# Patient Record
Sex: MALE | Race: WHITE | Employment: OTHER | URBAN - NONMETROPOLITAN AREA
[De-identification: names, ages, dates, MRNs, and addresses within clinical notes are randomized per-mention and may not be internally consistent; named-entity substitution may affect disease eponyms.]

---

## 2017-02-24 PROBLEM — R80.9 PROTEINURIA: Status: ACTIVE | Noted: 2017-02-24

## 2017-06-13 ENCOUNTER — HOSPITAL ENCOUNTER (OUTPATIENT)
Age: 67
Discharge: HOME | End: 2017-06-13
Payer: MEDICARE

## 2017-06-13 DIAGNOSIS — M10.9 ACUTE GOUT INVOLVING TOE OF LEFT FOOT, UNSPECIFIED CAUSE: ICD-10-CM

## 2017-06-13 PROBLEM — M25.376: Status: ACTIVE | Noted: 2017-06-13

## 2017-06-13 LAB
ANION GAP SERPL CALCULATED.3IONS-SCNC: 15 MMOL/L (ref 9–17)
BUN BLDV-MCNC: 23 MG/DL (ref 8–23)
BUN/CREAT BLD: 19 (ref 9–20)
CALCIUM SERPL-MCNC: 8.7 MG/DL (ref 8.6–10.4)
CHLORIDE BLD-SCNC: 104 MMOL/L (ref 98–107)
CO2: 22 MMOL/L (ref 20–31)
CREAT SERPL-MCNC: 1.19 MG/DL (ref 0.7–1.2)
GFR AFRICAN AMERICAN: >60 ML/MIN
GFR NON-AFRICAN AMERICAN: >60 ML/MIN
GFR SERPL CREATININE-BSD FRML MDRD: ABNORMAL ML/MIN/{1.73_M2}
GFR SERPL CREATININE-BSD FRML MDRD: ABNORMAL ML/MIN/{1.73_M2}
GLUCOSE BLD-MCNC: 158 MG/DL (ref 70–99)
POTASSIUM SERPL-SCNC: 3.8 MMOL/L (ref 3.7–5.3)
SODIUM BLD-SCNC: 141 MMOL/L (ref 135–144)
URIC ACID: 9.9 MG/DL (ref 3.4–7)

## 2017-06-13 PROCEDURE — 36415 COLL VENOUS BLD VENIPUNCTURE: CPT

## 2017-06-13 PROCEDURE — 84550 ASSAY OF BLOOD/URIC ACID: CPT

## 2017-06-13 PROCEDURE — 80048 BASIC METABOLIC PNL TOTAL CA: CPT

## 2017-11-06 ENCOUNTER — HOSPITAL ENCOUNTER (OUTPATIENT)
Dept: CT IMAGING | Age: 67
Discharge: HOME | End: 2017-11-06
Attending: INTERNAL MEDICINE | Admitting: INTERNAL MEDICINE
Payer: MEDICARE

## 2017-11-06 DIAGNOSIS — R91.1 LUNG NODULE: ICD-10-CM

## 2017-11-06 PROCEDURE — 71250 CT THORAX DX C-: CPT

## 2017-11-20 ENCOUNTER — OFFICE VISIT (OUTPATIENT)
Dept: PULMONOLOGY | Age: 67
End: 2017-11-20
Payer: MEDICARE

## 2017-11-20 VITALS
WEIGHT: 194 LBS | SYSTOLIC BLOOD PRESSURE: 135 MMHG | HEART RATE: 71 BPM | HEIGHT: 65 IN | RESPIRATION RATE: 16 BRPM | OXYGEN SATURATION: 99 % | TEMPERATURE: 97.2 F | BODY MASS INDEX: 32.32 KG/M2 | DIASTOLIC BLOOD PRESSURE: 82 MMHG

## 2017-11-20 DIAGNOSIS — R91.1 LUNG NODULE: Primary | ICD-10-CM

## 2017-11-20 DIAGNOSIS — E66.09 OBESITY DUE TO EXCESS CALORIES, UNSPECIFIED OBESITY SEVERITY: ICD-10-CM

## 2017-11-20 PROCEDURE — G8427 DOCREV CUR MEDS BY ELIG CLIN: HCPCS | Performed by: INTERNAL MEDICINE

## 2017-11-20 PROCEDURE — G8417 CALC BMI ABV UP PARAM F/U: HCPCS | Performed by: INTERNAL MEDICINE

## 2017-11-20 PROCEDURE — 1036F TOBACCO NON-USER: CPT | Performed by: INTERNAL MEDICINE

## 2017-11-20 PROCEDURE — 4040F PNEUMOC VAC/ADMIN/RCVD: CPT | Performed by: INTERNAL MEDICINE

## 2017-11-20 PROCEDURE — 1123F ACP DISCUSS/DSCN MKR DOCD: CPT | Performed by: INTERNAL MEDICINE

## 2017-11-20 PROCEDURE — 3017F COLORECTAL CA SCREEN DOC REV: CPT | Performed by: INTERNAL MEDICINE

## 2017-11-20 PROCEDURE — 99214 OFFICE O/P EST MOD 30 MIN: CPT | Performed by: INTERNAL MEDICINE

## 2017-11-20 PROCEDURE — G8482 FLU IMMUNIZE ORDER/ADMIN: HCPCS | Performed by: INTERNAL MEDICINE

## 2017-11-20 NOTE — PROGRESS NOTES
annually. Recommendations given regarding pneumococcal vaccinations. Maintain an active lifestyle. Questions  Patient had were answered to his satisfaction. CT scan of the chest was reviewed. As the lung nodule has been stable for 24 months. We will not repeat a CT scan of the chest.  Patient has smoked for less than 30 pack per day and hence would not qualify for lung cancer screen  We'll see the patient back as needed  Thank you for having us involved in the care of your patient. Please call us if you have any questions or concerns.       Renato Arreguin MD             11/20/2017, 3:24 PM

## 2017-12-15 DIAGNOSIS — I10 ESSENTIAL HYPERTENSION: ICD-10-CM

## 2017-12-15 DIAGNOSIS — E11.21 TYPE 2 DIABETES MELLITUS WITH DIABETIC NEPHROPATHY, WITHOUT LONG-TERM CURRENT USE OF INSULIN (HCC): ICD-10-CM

## 2017-12-15 RX ORDER — LISINOPRIL 2.5 MG/1
2.5 TABLET ORAL DAILY
Qty: 90 TABLET | Refills: 3 | Status: SHIPPED | OUTPATIENT
Start: 2017-12-15 | End: 2018-12-18 | Stop reason: SDUPTHER

## 2017-12-15 RX ORDER — METFORMIN HYDROCHLORIDE 500 MG/1
500 TABLET, EXTENDED RELEASE ORAL
Qty: 90 TABLET | Refills: 3 | Status: SHIPPED | OUTPATIENT
Start: 2017-12-15 | End: 2018-12-15 | Stop reason: SDUPTHER

## 2018-02-23 ENCOUNTER — OFFICE VISIT (OUTPATIENT)
Dept: FAMILY MEDICINE CLINIC | Age: 68
End: 2018-02-23
Payer: MEDICARE

## 2018-02-23 VITALS
BODY MASS INDEX: 33.26 KG/M2 | SYSTOLIC BLOOD PRESSURE: 120 MMHG | WEIGHT: 199.6 LBS | HEIGHT: 65 IN | DIASTOLIC BLOOD PRESSURE: 68 MMHG

## 2018-02-23 DIAGNOSIS — E78.5 HYPERLIPIDEMIA, UNSPECIFIED HYPERLIPIDEMIA TYPE: ICD-10-CM

## 2018-02-23 DIAGNOSIS — I10 ESSENTIAL HYPERTENSION: Primary | ICD-10-CM

## 2018-02-23 DIAGNOSIS — E11.9 TYPE 2 DIABETES MELLITUS WITHOUT COMPLICATION, UNSPECIFIED LONG TERM INSULIN USE STATUS: ICD-10-CM

## 2018-02-23 DIAGNOSIS — Z12.5 SCREENING PSA (PROSTATE SPECIFIC ANTIGEN): ICD-10-CM

## 2018-02-23 DIAGNOSIS — E66.09 OBESITY DUE TO EXCESS CALORIES, UNSPECIFIED CLASSIFICATION, UNSPECIFIED WHETHER SERIOUS COMORBIDITY PRESENT: ICD-10-CM

## 2018-02-23 PROCEDURE — G8482 FLU IMMUNIZE ORDER/ADMIN: HCPCS | Performed by: FAMILY MEDICINE

## 2018-02-23 PROCEDURE — G8427 DOCREV CUR MEDS BY ELIG CLIN: HCPCS | Performed by: FAMILY MEDICINE

## 2018-02-23 PROCEDURE — 99214 OFFICE O/P EST MOD 30 MIN: CPT | Performed by: FAMILY MEDICINE

## 2018-02-23 PROCEDURE — 1123F ACP DISCUSS/DSCN MKR DOCD: CPT | Performed by: FAMILY MEDICINE

## 2018-02-23 PROCEDURE — 4040F PNEUMOC VAC/ADMIN/RCVD: CPT | Performed by: FAMILY MEDICINE

## 2018-02-23 PROCEDURE — 1036F TOBACCO NON-USER: CPT | Performed by: FAMILY MEDICINE

## 2018-02-23 PROCEDURE — 3017F COLORECTAL CA SCREEN DOC REV: CPT | Performed by: FAMILY MEDICINE

## 2018-02-23 PROCEDURE — 3045F PR MOST RECENT HEMOGLOBIN A1C LEVEL 7.0-9.0%: CPT | Performed by: FAMILY MEDICINE

## 2018-02-23 PROCEDURE — G8417 CALC BMI ABV UP PARAM F/U: HCPCS | Performed by: FAMILY MEDICINE

## 2018-02-23 NOTE — PROGRESS NOTES
PO Take 1 tablet by mouth daily     Yes Historical Provider, MD   therapeutic multivitamin-minerals (THERAGRAN-M) tablet Take 1 tablet by mouth daily.       Yes Historical Provider, MD   vitamin D (CHOLECALCIFEROL) 1000 UNIT TABS tablet Take 1,000 Units by mouth daily.       Yes Historical Provider, MD   allopurinol (ZYLOPRIM) 100 MG tablet Take 1 tablet by mouth daily 6/13/17     Janie Tavares MD            ROS:  General Constitutional: Denies chills. Denies fever. Denies headache. Denies lightheadedness. Ophthalmologic: Denies blurred vision. ENT: Denies nasal congestion. Denies sore throat. Denies ear pain and pressure. Respiratory: Denies cough. Denies shortness of breath. Denies wheezing. Cardiovascular: Denies chest pain at rest. Denies irregular heartbeat. Denies palpitations. Gastrointestinal: Denies abdominal pain. Denies blood in the stool. Denies constipation. Denies diarrhea. Denies nausea. Denies vomiting. Genitourinary: Denies blood in the urine. Denies difficulty urinating. Denies frequent urination. Denies painful urination. Denies urinary incontinence  Musculoskeletal: Denies muscle aches. Denies painful joints. Denies swollen joints. Peripheral Vascular: Denies pain/cramping in legs after exertion. Skin: Denies dry skin. Denies itching. Denies rash. Neurologic: Denies falls. Denies dizziness. Denies fainting. Denies tingling/numbness. Psychiatric: Denies sleep disturbance. Denies anxiety.  Denies depressed mood.     Past Surgical History         Past Surgical History:   Procedure Laterality Date    APPENDECTOMY   1974    CYSTOSCOPY   2000     (-)    CYSTOSCOPY   03/2012     retrograde Dr. Alina Lemon: (-)    CYSTOURETHROSCOPY   4/3/00,6/13/89, 3/16/2012    TONSILLECTOMY AND ADENOIDECTOMY                Family History          Family History   Problem Relation Age of Onset    Cancer Father         leukemia    Depression Sister              Past Medical History        Past Medical History:

## 2018-02-23 NOTE — PROGRESS NOTES
The following note was scribed by: {Blank single:[id number]::\"LEILANI Aguilar [facility]\",\"TITO Stephen Serut, RMA\",\"TRAVON Claude BrightWing Narayan, RMA\"}    [address] [address]  [place] [address]  Dept: [phone]    Jarrett Mccormick is a 69 y.o. male here for 6 Month Follow-Up; Diabetes; Hyperlipidemia; and Hypertension      HPI:    DIABETES MELLITUS TYPE II   Medication compliance:  compliant most of the time  Diabetic diet compliance:  compliant most of the time  Current exercise: walks 1-2 miles daily  How long are you exercising during the week? 45 min of walking 3 days a week  Frequency of testing: n/a  Home blood sugar records: n/a  Any episodes of hypoglycemia? no  Eye exam current (within one year): Yes, Dr. Ashly Dobbins  Diabetic foot check in the past year Yes 08/25/2017   reports that he quit smoking about 5 years ago. His smoking use included Cigarettes, Pipe, and Cigars. He quit after 30.00 years of use. He has never used smokeless tobacco.      HYPERTENSION  He is exercising, for 45 min 3 days a week and is adherent to a low-salt diet.  Blood pressure is not being monitored at home. Cardiac symptoms: none. Patient denies: chest pain, irregular heart beat and palpitations.     HYPERLIPIDEMIA    Medication compliance: compliant all of the time. Patient is  following a low fat, low cholesterol diet.  He is  exercising regularly, for 45 min a day 3 days a week.     He is accompanied by his wife, Anand Dobbins. Prior to Admission medications    Medication Sig Start Date End Date Taking?  Authorizing Provider   metFORMIN (GLUCOPHAGE-XR) 500 MG extended release tablet Take 1 tablet by mouth daily (with breakfast) 12/15/17  Yes Miguelina Ramirez MD   lisinopril (PRINIVIL;ZESTRIL) 2.5 MG tablet Take 1 tablet by mouth daily 12/15/17  Yes Miguelina Ramirez MD   aspirin 81 MG tablet Take 81 mg by mouth daily   Yes Historical Provider, MD   CINNAMON PO Take 1 tablet by mouth 3 times daily    Yes 02/09/2013    A1C 7.2    Esophageal reflux 7/2001    Gastritis     GERD (gastroesophageal reflux disease)     Hematuria     Hyperglycemia 9/1999    Hyperlipidemia     Hypertension     Obesity, unspecified 2/2/2015    Other abnormal glucose 2/2/2015    Other and unspecified hyperlipidemia 7/1992    Renal cyst     Syncope and collapse 2/2/2015    Type II or unspecified type diabetes mellitus without mention of complication, not stated as uncontrolled 2012    Unspecified essential hypertension 10/1991    borderline    Vasodepressor syncope 9/2006      Social History   Substance Use Topics    Smoking status: Former Smoker     Years: 30.00     Types: Cigarettes, Pipe, Cigars     Quit date: 2/1/2012    Smokeless tobacco: Never Used    Alcohol use No      Current Outpatient Prescriptions   Medication Sig Dispense Refill    metFORMIN (GLUCOPHAGE-XR) 500 MG extended release tablet Take 1 tablet by mouth daily (with breakfast) 90 tablet 3    lisinopril (PRINIVIL;ZESTRIL) 2.5 MG tablet Take 1 tablet by mouth daily 90 tablet 3    aspirin 81 MG tablet Take 81 mg by mouth daily      CINNAMON PO Take 1 tablet by mouth 3 times daily       LUTEIN-ZEAXANTHIN PO Take 1 tablet by mouth daily      therapeutic multivitamin-minerals (THERAGRAN-M) tablet Take 1 tablet by mouth daily.  vitamin D (CHOLECALCIFEROL) 1000 UNIT TABS tablet Take 1,000 Units by mouth daily.  allopurinol (ZYLOPRIM) 100 MG tablet Take 1 tablet by mouth daily 90 tablet 0     No current facility-administered medications for this visit.       No Known Allergies    PHYSICAL EXAM:    /68   Ht 5' 5\" (1.651 m)   Wt 199 lb 9.6 oz (90.5 kg)   BMI 33.22 kg/m²   Wt Readings from Last 3 Encounters:   02/23/18 199 lb 9.6 oz (90.5 kg)   11/20/17 194 lb (88 kg)   08/25/17 198 lb 12.8 oz (90.2 kg)     BP Readings from Last 3 Encounters:   02/23/18 120/68   11/20/17 135/82   08/25/17 (!) 140/78       General Appearance: in no acute

## 2018-02-23 NOTE — PATIENT INSTRUCTIONS
PLAN:  I review his lab results. This shows that his microalbumin level was over 39. This level has improved but I explain that this means that he spilling some protein over into his urine and over time, this can cause kidney damage. He denies any side effects from the metformin. His hemoglobin A1C level has not improved. We discuss his health being a full time job. He retired in October of last year. He has gained 15-20 pounds in 3-4 years. Weight loss would be beneficial for his diabetes. We discuss dietary problems. He needs to restrict simple carbohydrates such as pastas, potatoes, white breads, white rices, etc. Ultimately, these are his disease states and he will have to change the outcome by avoiding certain things. I will give him a copy of The GI Diet book. He can use this as a tool when making dietary choices that would be beneficial for diabetes. I will see him back in 6 months. PLAN:  I review his lab results. This shows that his microalbumin level was over 39. This level has improved but I explain that this means that he spilling some protein over into his urine and over time, this can cause kidney damage. He denies any side effects from the metformin. His hemoglobin A1C level has not improved. We discuss his health being a full time job. He retired in October of last year. He has gained 15-20 pounds in 3-4 years. Weight loss would be beneficial for his diabetes. We discuss dietary problems. He needs to restrict simple carbohydrates such as pastas, potatoes, white breads, white rices, etc. Ultimately, these are his disease states and he will have to change the outcome by avoiding certain things. I will give him a copy of The GI Diet book. He can use this as a tool when making dietary choices that would be beneficial for diabetes. I will see him back in 6 months.

## 2018-04-11 PROBLEM — Z12.5 SCREENING PSA (PROSTATE SPECIFIC ANTIGEN): Status: RESOLVED | Noted: 2018-02-23 | Resolved: 2018-04-11

## 2018-08-17 LAB
ABSOLUTE BASO #: 0.1 K/UL (ref 0–0.1)
ABSOLUTE EOS #: 0.3 K/UL (ref 0.1–0.4)
ABSOLUTE LYMPH #: 2.1 K/UL (ref 0.8–5.2)
ABSOLUTE MONO #: 0.6 K/UL (ref 0.1–0.9)
ABSOLUTE NEUT #: 4.6 K/UL (ref 1.3–9.1)
ALT SERPL-CCNC: 40 U/L (ref 5–50)
ANION GAP SERPL CALCULATED.3IONS-SCNC: 15 MEQ/L (ref 10–19)
AST SERPL-CCNC: 24 U/L (ref 9–50)
AVERAGE GLUCOSE: 197 MG/DL (ref 66–114)
BASOPHILS RELATIVE PERCENT: 1 %
BUN BLDV-MCNC: 28 MG/DL (ref 8–23)
CALCIUM SERPL-MCNC: 9.5 MG/DL (ref 8.5–10.5)
CHLORIDE BLD-SCNC: 101 MEQ/L (ref 95–107)
CO2: 25 MEQ/L (ref 19–31)
CREAT SERPL-MCNC: 1.3 MG/DL (ref 0.8–1.4)
EGFR AFRICAN AMERICAN: 64.1 ML/MIN/1.73 M2
EGFR IF NONAFRICAN AMERICAN: 55.3 ML/MIN/1.73 M2
EOSINOPHILS RELATIVE PERCENT: 3.9 %
GLUCOSE: 211 MG/DL (ref 70–99)
HBA1C MFR BLD: 8.5 % (ref 4.2–5.8)
HCT VFR BLD CALC: 44.6 % (ref 41.4–51)
HEMOGLOBIN: 15.3 G/DL (ref 13.8–17)
HIGH SENSITIVE C-REACTIVE PROTEIN: 4.37 MG/L
LYMPHOCYTE %: 27.2 %
MCH RBC QN AUTO: 30.1 PG (ref 27–34)
MCHC RBC AUTO-ENTMCNC: 34.3 G/DL (ref 31–36)
MCV RBC AUTO: 87.6 FL (ref 80–100)
MONOCYTES # BLD: 7.4 %
NEUTROPHILS RELATIVE PERCENT: 60 %
PDW BLD-RTO: 13 % (ref 10.8–14.8)
PLATELETS: 199 K/UL (ref 150–450)
POTASSIUM SERPL-SCNC: 4.6 MEQ/L (ref 3.5–5.4)
PSA, ULTRASENSITIVE: 0.58 NG/ML
RBC: 5.09 M/UL (ref 4–5.5)
SODIUM BLD-SCNC: 141 MEQ/L (ref 135–146)
WBC: 7.7 K/UL (ref 3.7–10.8)

## 2018-08-24 ENCOUNTER — OFFICE VISIT (OUTPATIENT)
Dept: FAMILY MEDICINE CLINIC | Age: 68
End: 2018-08-24
Payer: MEDICARE

## 2018-08-24 VITALS
BODY MASS INDEX: 33.76 KG/M2 | DIASTOLIC BLOOD PRESSURE: 78 MMHG | SYSTOLIC BLOOD PRESSURE: 132 MMHG | HEIGHT: 65 IN | WEIGHT: 202.6 LBS

## 2018-08-24 DIAGNOSIS — Z12.5 SCREENING PSA (PROSTATE SPECIFIC ANTIGEN): ICD-10-CM

## 2018-08-24 DIAGNOSIS — E66.9 OBESITY, UNSPECIFIED CLASSIFICATION, UNSPECIFIED OBESITY TYPE, UNSPECIFIED WHETHER SERIOUS COMORBIDITY PRESENT: ICD-10-CM

## 2018-08-24 DIAGNOSIS — E78.5 HYPERLIPIDEMIA, UNSPECIFIED HYPERLIPIDEMIA TYPE: ICD-10-CM

## 2018-08-24 DIAGNOSIS — Z23 NEED FOR PROPHYLACTIC VACCINATION AGAINST STREPTOCOCCUS PNEUMONIAE (PNEUMOCOCCUS): ICD-10-CM

## 2018-08-24 DIAGNOSIS — I10 ESSENTIAL HYPERTENSION: ICD-10-CM

## 2018-08-24 PROCEDURE — 1101F PT FALLS ASSESS-DOCD LE1/YR: CPT | Performed by: FAMILY MEDICINE

## 2018-08-24 PROCEDURE — 90670 PCV13 VACCINE IM: CPT | Performed by: FAMILY MEDICINE

## 2018-08-24 PROCEDURE — 3045F PR MOST RECENT HEMOGLOBIN A1C LEVEL 7.0-9.0%: CPT | Performed by: FAMILY MEDICINE

## 2018-08-24 PROCEDURE — 3017F COLORECTAL CA SCREEN DOC REV: CPT | Performed by: FAMILY MEDICINE

## 2018-08-24 PROCEDURE — 1036F TOBACCO NON-USER: CPT | Performed by: FAMILY MEDICINE

## 2018-08-24 PROCEDURE — 99214 OFFICE O/P EST MOD 30 MIN: CPT | Performed by: FAMILY MEDICINE

## 2018-08-24 PROCEDURE — 4040F PNEUMOC VAC/ADMIN/RCVD: CPT | Performed by: FAMILY MEDICINE

## 2018-08-24 PROCEDURE — G0009 ADMIN PNEUMOCOCCAL VACCINE: HCPCS | Performed by: FAMILY MEDICINE

## 2018-08-24 PROCEDURE — 2022F DILAT RTA XM EVC RTNOPTHY: CPT | Performed by: FAMILY MEDICINE

## 2018-08-24 PROCEDURE — G8427 DOCREV CUR MEDS BY ELIG CLIN: HCPCS | Performed by: FAMILY MEDICINE

## 2018-08-24 PROCEDURE — G8417 CALC BMI ABV UP PARAM F/U: HCPCS | Performed by: FAMILY MEDICINE

## 2018-08-24 PROCEDURE — 1123F ACP DISCUSS/DSCN MKR DOCD: CPT | Performed by: FAMILY MEDICINE

## 2018-08-24 NOTE — PATIENT INSTRUCTIONS
PLAN:  Labs reviewed with patient, I am overall pleased with these results. His A1C went up quite a bit, he admits to not exercising often in the summer. This is an uncontrolled diabetic range, I will start him on Trulicity 9.42 injection, once weekly injection. I discuss with him, his risks for heart attacks and strokes and how to help lower his risks. He is agreeable with this. Shingrix, diabetic eye exam, fit kit/colonoscopy discussed with patient. SURVEY:    You may be receiving a survey from [employer] regarding your visit today. Please complete the survey to enable us to provide the highest quality of care to you and your family. If you cannot score us a very good on any question, please call the office to discuss how we could of made your experience a very good one. Thank you.

## 2018-08-24 NOTE — PROGRESS NOTES
PARISA Byrd, am scribing for and in the presence of Dr. Edgar Camacho. 8/24/18/9:57am/SNP    [address] [address]  [place] [address]  Dept: [phone]    Gisell Adams is a 77 y.o. male here for 6 Month Follow-Up; Diabetes; Hypertension; and Hyperlipidemia    HPI:  DIABETES MELLITUS TYPE II   Medication compliance: compliant most of the time  Diabetic diet compliance: compliant most of the time  Current exercise: walking  How long are you exercising during the week? 45 min, 3 days a week  Frequency of testing: none  Any episodes of hypoglycemia? no  Eye exam current (within one year): Yes, Dr. Gann Quiet  Diabetic foot check in the past year Yes, today   reports that he quit smoking about 5 years ago. His smoking use included Cigarettes, Pipe, and Cigars. He quit after 30.00 years of use. He has never used smokeless tobacco.      HYPERTENSION  He is exercising, for 45 min 3 days a week and is adherent to a low-salt diet.  Blood pressure is not being monitored at home. Cardiac symptoms: none. Patient denies: chest pain, irregular heart beat and palpitations.     HYPERLIPIDEMIA    Medication compliance: compliant all of the time. Patient is  following a low fat, low cholesterol diet.  He is  exercising regularly, for 45 min a day 3 days a week. Prior to Admission medications    Medication Sig Start Date End Date Taking?  Authorizing Provider   Dulaglutide (TRULICITY) 5.94 GS/6.1TS SOPN Inject 0.75 mg into the skin once a week 8/24/18  Yes Edgar Camacho MD   metFORMIN (GLUCOPHAGE-XR) 500 MG extended release tablet Take 1 tablet by mouth daily (with breakfast) 12/15/17  Yes Edgar Camacho MD   lisinopril (PRINIVIL;ZESTRIL) 2.5 MG tablet Take 1 tablet by mouth daily 12/15/17  Yes Edgar Camacho MD   allopurinol (ZYLOPRIM) 100 MG tablet Take 1 tablet by mouth daily 6/13/17  Yes Edgar Camacho MD   aspirin 81 MG tablet Take 81 mg by mouth daily   Yes Historical Provider, MD   CINNAMON PO Take 1 tablet by mouth 3 times daily    Yes Historical Provider, MD   LUTEIN-ZEAXANTHIN PO Take 1 tablet by mouth daily   Yes Historical Provider, MD   therapeutic multivitamin-minerals (THERAGRAN-M) tablet Take 1 tablet by mouth daily. Yes Historical Provider, MD   vitamin D (CHOLECALCIFEROL) 1000 UNIT TABS tablet Take 1,000 Units by mouth daily. Yes Historical Provider, MD     ROS:  General Constitutional: Denies chills. Denies fever. Denies headache. Denies lightheadedness. Ophthalmologic: Denies blurred vision. ENT: Denies nasal congestion. Denies sore throat. Denies ear pain and pressure. Respiratory: Denies cough. Denies shortness of breath. Denies wheezing. Cardiovascular: Denies chest pain at rest. Denies irregular heartbeat. Denies palpitations. Gastrointestinal: Denies abdominal pain. Denies blood in the stool. Denies constipation. Denies diarrhea. Denies nausea. Denies vomiting. Genitourinary: Denies blood in the urine. Denies difficulty urinating. Denies frequent urination. Denies painful urination. Denies urinary incontinence  Musculoskeletal: Denies muscle aches. Denies painful joints. Denies swollen joints. Peripheral Vascular: Denies pain/cramping in legs after exertion. Skin: Denies dry skin. Denies itching. Denies rash. Neurologic: Denies falls. Denies dizziness. Denies fainting. Denies tingling/numbness. Psychiatric: Denies sleep disturbance. Denies anxiety. Denies depressed mood.     Past Surgical History:   Procedure Laterality Date    APPENDECTOMY  1974    CYSTOSCOPY  2000    (-)    CYSTOSCOPY  03/2012    retrograde Dr. Baron Leary: (-)    CYSTOURETHROSCOPY  4/3/00,6/13/89, 3/16/2012    TONSILLECTOMY AND ADENOIDECTOMY       Family History   Problem Relation Age of Onset    Cancer Father         leukemia    Depression Sister      Past Medical History:   Diagnosis Date    Benign hematuria     Diabetes mellitus ([facility] [address] 02/09/2013    A1C 7.2  Esophageal reflux 7/2001    Gastritis     GERD (gastroesophageal reflux disease)     Hematuria     Hyperglycemia 9/1999    Hyperlipidemia     Hypertension     Obesity, unspecified 2/2/2015    Other abnormal glucose 2/2/2015    Other and unspecified hyperlipidemia 7/1992    Renal cyst     Syncope and collapse 2/2/2015    Type II or unspecified type diabetes mellitus without mention of complication, not stated as uncontrolled 2012    Unspecified essential hypertension 10/1991    borderline    Vasodepressor syncope 9/2006      Social History   Substance Use Topics    Smoking status: Former Smoker     Packs/day: 0.75     Years: 30.00     Types: Cigars, Cigarettes, Pipe     Quit date: 2/1/2012    Smokeless tobacco: Never Used    Alcohol use No      Current Outpatient Prescriptions   Medication Sig Dispense Refill    Dulaglutide (TRULICITY) 1.65 VD/6.4FM SOPN Inject 0.75 mg into the skin once a week 3 pen 0    metFORMIN (GLUCOPHAGE-XR) 500 MG extended release tablet Take 1 tablet by mouth daily (with breakfast) 90 tablet 3    lisinopril (PRINIVIL;ZESTRIL) 2.5 MG tablet Take 1 tablet by mouth daily 90 tablet 3    allopurinol (ZYLOPRIM) 100 MG tablet Take 1 tablet by mouth daily 90 tablet 0    aspirin 81 MG tablet Take 81 mg by mouth daily      CINNAMON PO Take 1 tablet by mouth 3 times daily       LUTEIN-ZEAXANTHIN PO Take 1 tablet by mouth daily      therapeutic multivitamin-minerals (THERAGRAN-M) tablet Take 1 tablet by mouth daily.  vitamin D (CHOLECALCIFEROL) 1000 UNIT TABS tablet Take 1,000 Units by mouth daily. No current facility-administered medications for this visit.       No Known Allergies    PHYSICAL EXAM:    /78 (Site: Left Arm, Position: Sitting, Cuff Size: Large Adult)   Ht 5' 5\" (1.651 m)   Wt 202 lb 9.6 oz (91.9 kg)   BMI 33.71 kg/m²   Wt Readings from Last 3 Encounters:   08/24/18 202 lb 9.6 oz (91.9 kg)   02/23/18 199 lb 9.6 oz (90.5 kg)   11/20/17 194 I am overall pleased with these results. His A1C went up quite a bit, he admits to not exercising often in the summer. This is an uncontrolled diabetic range, I will start him on Trulicity 0.41 injection, once weekly injection. I discuss with him, his risks for heart attacks and strokes and how to help lower his risks. He is agreeable with this. Shingrix, diabetic eye exam, fit kit/colonoscopy discussed with patient. Orders Placed This Encounter   Procedures    PREVNAR 13 IM (Pneumococcal conjugate vaccine 13-valent)    ALT     Standing Status:   Future     Standing Expiration Date:   8/24/2019    AST     Standing Status:   Future     Standing Expiration Date:   8/24/2019    Basic Metabolic Panel     Standing Status:   Future     Standing Expiration Date:   8/24/2019    CBC     Standing Status:   Future     Standing Expiration Date:   8/24/2019   [facility] CRP,High Sensitivity     Standing Status:   Future     Standing Expiration Date:   8/24/2019    Hemoglobin A1C     Standing Status:   Future     Standing Expiration Date:   8/24/2019    Lipid Panel     Standing Status:   Future     Standing Expiration Date:   8/24/2019     Order Specific Question:   Is Patient Fasting?/# of Hours     Answer:   no fasting required    Psa screening     Standing Status:   Future     Standing Expiration Date:   8/24/2019    Microalbumin, Ur     Standing Status:   Future     Standing Expiration Date:   8/24/2019    HM DIABETES FOOT EXAM    WA INJECTION,THERAP/PROPH/DIAGNOST, IM OR SUBCUT     Orders Placed This Encounter   Medications    Dulaglutide (TRULICITY) 1.82 ER/4.0JE SOPN     Sig: Inject 0.75 mg into the skin once a week     Dispense:  3 pen     Refill:  0   I, Dr. Josiah Kaye, personally performed the services described in this documentation as scribed by TITO Cannon in my presence, and it is both accurate and complete.       1. Uncontrolled type II diabetes mellitus with nephropathy ([address]  Worsening based

## 2018-09-17 ENCOUNTER — OFFICE VISIT (OUTPATIENT)
Dept: FAMILY MEDICINE CLINIC | Age: 68
End: 2018-09-17
Payer: MEDICARE

## 2018-09-17 VITALS
SYSTOLIC BLOOD PRESSURE: 134 MMHG | HEIGHT: 65 IN | WEIGHT: 201 LBS | DIASTOLIC BLOOD PRESSURE: 76 MMHG | BODY MASS INDEX: 33.49 KG/M2

## 2018-09-17 DIAGNOSIS — I10 ESSENTIAL HYPERTENSION: ICD-10-CM

## 2018-09-17 DIAGNOSIS — I95.1 ORTHOSTASIS: Primary | ICD-10-CM

## 2018-09-17 PROCEDURE — G8427 DOCREV CUR MEDS BY ELIG CLIN: HCPCS | Performed by: FAMILY MEDICINE

## 2018-09-17 PROCEDURE — 2022F DILAT RTA XM EVC RTNOPTHY: CPT | Performed by: FAMILY MEDICINE

## 2018-09-17 PROCEDURE — 1036F TOBACCO NON-USER: CPT | Performed by: FAMILY MEDICINE

## 2018-09-17 PROCEDURE — G8417 CALC BMI ABV UP PARAM F/U: HCPCS | Performed by: FAMILY MEDICINE

## 2018-09-17 PROCEDURE — 82962 GLUCOSE BLOOD TEST: CPT | Performed by: FAMILY MEDICINE

## 2018-09-17 PROCEDURE — 3017F COLORECTAL CA SCREEN DOC REV: CPT | Performed by: FAMILY MEDICINE

## 2018-09-17 PROCEDURE — 4040F PNEUMOC VAC/ADMIN/RCVD: CPT | Performed by: FAMILY MEDICINE

## 2018-09-17 PROCEDURE — 99213 OFFICE O/P EST LOW 20 MIN: CPT | Performed by: FAMILY MEDICINE

## 2018-09-17 PROCEDURE — 1101F PT FALLS ASSESS-DOCD LE1/YR: CPT | Performed by: FAMILY MEDICINE

## 2018-09-17 PROCEDURE — 1123F ACP DISCUSS/DSCN MKR DOCD: CPT | Performed by: FAMILY MEDICINE

## 2018-09-17 PROCEDURE — 3045F PR MOST RECENT HEMOGLOBIN A1C LEVEL 7.0-9.0%: CPT | Performed by: FAMILY MEDICINE

## 2018-09-17 NOTE — PROGRESS NOTES
02/23/18 120/68      PHYSICAL EXAM:  General Appearance: in no acute distress, well developed, well nourished. Eyes: pupils equal, round reactive to light and accommodation. Wearing glasses constricted 1 mm, equal, no nystagmus in sitting position. Ears: Moderate amount of cerumen upper portion of TM's appear normal  Nose: nares patent, no lesions. Oral Cavity: mucosa moist.  Throat: clear. Neck/Thyroid: neck supple, full range of motion, no cervical lymphadenopathy, no thyromegaly or carotid bruits. Skin: warm and dry. No suspicious lesions. Heart: regular rate and rhythm. No murmurs. S1, S2 normal, no gallops. Rate: 60   Lungs: clear to auscultation bilaterally. Abdomen: bowel sounds present, soft, nontender, nondistended, no masses or organomegaly obese, round. Musculoskeletal: normal, full range of motion in knees and hips, no swelling or tenderness. Extremities: no cyanosis or edema. Peripheral Pulses: 2+ throughout, symetric. Neurologic: nonfocal, motor strength normal upper and lower extremities, sensory exam intact. Psych: normal affect, speech fluent. ASSESSMENT:   Diagnosis Orders   1. Orthostasis  Glucose, Random   2. Uncontrolled diabetes mellitus type 2 without complications, unspecified whether long term insulin use (HCC)  Glucose, Random   3. Essential hypertension            PLAN:  I will get a random glucose level, today. I don't think that his glucose is dropping too low because trulicity is just an insulin sensitizing agent. I will also get a sitting and standing BP because he has a history of orthostatic hypotension. I suspect that this is what is going on. No orders of the defined types were placed in this encounter.       Encounter Medications        I, Dr. Mitra Justin, personally performed the services described in this documentation as scribed by PARISA Giron in my presence, and it is both accurate and complete.

## 2018-09-23 PROBLEM — Z12.5 SCREENING PSA (PROSTATE SPECIFIC ANTIGEN): Status: RESOLVED | Noted: 2018-02-23 | Resolved: 2018-09-23

## 2018-10-19 RX ORDER — DULAGLUTIDE 0.75 MG/.5ML
INJECTION, SOLUTION SUBCUTANEOUS
Qty: 8 PEN | Refills: 3 | Status: SHIPPED | OUTPATIENT
Start: 2018-10-19 | End: 2019-02-27 | Stop reason: DRUGHIGH

## 2018-12-15 DIAGNOSIS — E11.21 TYPE 2 DIABETES MELLITUS WITH DIABETIC NEPHROPATHY, WITHOUT LONG-TERM CURRENT USE OF INSULIN (HCC): ICD-10-CM

## 2018-12-17 RX ORDER — METFORMIN HYDROCHLORIDE 500 MG/1
TABLET, EXTENDED RELEASE ORAL
Qty: 90 TABLET | Refills: 3 | Status: SHIPPED | OUTPATIENT
Start: 2018-12-17 | End: 2019-12-13 | Stop reason: SDUPTHER

## 2018-12-18 DIAGNOSIS — I10 ESSENTIAL HYPERTENSION: ICD-10-CM

## 2018-12-18 RX ORDER — LISINOPRIL 2.5 MG/1
TABLET ORAL
Qty: 90 TABLET | Refills: 3 | Status: SHIPPED | OUTPATIENT
Start: 2018-12-18 | End: 2019-12-13 | Stop reason: SDUPTHER

## 2018-12-18 NOTE — TELEPHONE ENCOUNTER
diabetes mellitus type 2 without complications ([facility] [address]     Type 2 diabetes mellitus without complication ([facility]     Need for prophylactic vaccination against Streptococcus pneumoniae (pneumococcus)     Orthostasis

## 2019-01-26 ENCOUNTER — HOSPITAL ENCOUNTER (EMERGENCY)
Age: 69
Discharge: HOME | End: 2019-01-26
Payer: MEDICARE

## 2019-01-26 ENCOUNTER — APPOINTMENT (OUTPATIENT)
Dept: GENERAL RADIOLOGY | Age: 69
End: 2019-01-26
Payer: MEDICARE

## 2019-01-26 VITALS
TEMPERATURE: 98.6 F | OXYGEN SATURATION: 97 % | HEART RATE: 76 BPM | DIASTOLIC BLOOD PRESSURE: 89 MMHG | SYSTOLIC BLOOD PRESSURE: 131 MMHG | RESPIRATION RATE: 20 BRPM

## 2019-01-26 DIAGNOSIS — S62.102A WRIST FRACTURE, CLOSED, LEFT, INITIAL ENCOUNTER: Primary | ICD-10-CM

## 2019-01-26 PROCEDURE — 73090 X-RAY EXAM OF FOREARM: CPT

## 2019-01-26 PROCEDURE — 90471 IMMUNIZATION ADMIN: CPT | Performed by: PHYSICIAN ASSISTANT

## 2019-01-26 PROCEDURE — 6360000002 HC RX W HCPCS: Performed by: PHYSICIAN ASSISTANT

## 2019-01-26 PROCEDURE — 29125 APPL SHORT ARM SPLINT STATIC: CPT

## 2019-01-26 PROCEDURE — 90715 TDAP VACCINE 7 YRS/> IM: CPT | Performed by: PHYSICIAN ASSISTANT

## 2019-01-26 PROCEDURE — 99283 EMERGENCY DEPT VISIT LOW MDM: CPT

## 2019-01-26 RX ORDER — HYDROCODONE BITARTRATE AND ACETAMINOPHEN 5; 325 MG/1; MG/1
1 TABLET ORAL EVERY 6 HOURS PRN
Qty: 16 TABLET | Refills: 0 | Status: SHIPPED | OUTPATIENT
Start: 2019-01-26 | End: 2019-01-30

## 2019-01-26 RX ADMIN — TETANUS TOXOID, REDUCED DIPHTHERIA TOXOID AND ACELLULAR PERTUSSIS VACCINE, ADSORBED 0.5 ML: 5; 2.5; 8; 8; 2.5 SUSPENSION INTRAMUSCULAR at 19:03

## 2019-01-26 ASSESSMENT — PAIN DESCRIPTION - PAIN TYPE: TYPE: ACUTE PAIN

## 2019-01-26 ASSESSMENT — PAIN DESCRIPTION - LOCATION: LOCATION: ARM

## 2019-01-26 ASSESSMENT — PAIN DESCRIPTION - ORIENTATION: ORIENTATION: LEFT

## 2019-01-26 ASSESSMENT — PAIN SCALES - GENERAL: PAINLEVEL_OUTOF10: 5

## 2019-01-30 ENCOUNTER — HOSPITAL ENCOUNTER (OUTPATIENT)
Age: 69
Discharge: HOME | End: 2019-01-30
Payer: MEDICARE

## 2019-01-30 LAB
ANION GAP SERPL CALCULATED.3IONS-SCNC: 14 MMOL/L (ref 9–17)
BUN BLDV-MCNC: 25 MG/DL (ref 8–23)
BUN/CREAT BLD: 18 (ref 9–20)
CALCIUM SERPL-MCNC: 9.6 MG/DL (ref 8.6–10.4)
CHLORIDE BLD-SCNC: 100 MMOL/L (ref 98–107)
CO2: 26 MMOL/L (ref 20–31)
CREAT SERPL-MCNC: 1.36 MG/DL (ref 0.7–1.2)
EKG ATRIAL RATE: 85 BPM
EKG P AXIS: 65 DEGREES
EKG P-R INTERVAL: 154 MS
EKG Q-T INTERVAL: 370 MS
EKG QRS DURATION: 92 MS
EKG QTC CALCULATION (BAZETT): 440 MS
EKG R AXIS: 6 DEGREES
EKG T AXIS: 28 DEGREES
EKG VENTRICULAR RATE: 85 BPM
GFR AFRICAN AMERICAN: >60 ML/MIN
GFR NON-AFRICAN AMERICAN: 52 ML/MIN
GFR SERPL CREATININE-BSD FRML MDRD: ABNORMAL ML/MIN/{1.73_M2}
GFR SERPL CREATININE-BSD FRML MDRD: ABNORMAL ML/MIN/{1.73_M2}
GLUCOSE BLD-MCNC: 153 MG/DL (ref 70–99)
HCT VFR BLD CALC: 47.9 % (ref 40.7–50.3)
HEMOGLOBIN: 15.3 G/DL (ref 13–17)
MCH RBC QN AUTO: 29.6 PG (ref 25.2–33.5)
MCHC RBC AUTO-ENTMCNC: 31.9 G/DL (ref 28.4–34.8)
MCV RBC AUTO: 92.6 FL (ref 82.6–102.9)
NRBC AUTOMATED: 0 PER 100 WBC
PDW BLD-RTO: 13 % (ref 11.8–14.4)
PLATELET # BLD: 225 K/UL (ref 138–453)
PMV BLD AUTO: 10.4 FL (ref 8.1–13.5)
POTASSIUM SERPL-SCNC: 4.5 MMOL/L (ref 3.7–5.3)
RBC # BLD: 5.17 M/UL (ref 4.21–5.77)
SODIUM BLD-SCNC: 140 MMOL/L (ref 135–144)
WBC # BLD: 8.9 K/UL (ref 3.5–11.3)

## 2019-01-30 PROCEDURE — 36415 COLL VENOUS BLD VENIPUNCTURE: CPT

## 2019-01-30 PROCEDURE — 93005 ELECTROCARDIOGRAM TRACING: CPT

## 2019-01-30 PROCEDURE — 85027 COMPLETE CBC AUTOMATED: CPT

## 2019-01-30 PROCEDURE — 80048 BASIC METABOLIC PNL TOTAL CA: CPT

## 2019-02-19 LAB
ABSOLUTE BASO #: 0.1 K/UL (ref 0–0.1)
ABSOLUTE EOS #: 0.3 K/UL (ref 0.1–0.4)
ABSOLUTE LYMPH #: 2.1 K/UL (ref 0.8–5.2)
ABSOLUTE MONO #: 0.5 K/UL (ref 0.1–0.9)
ABSOLUTE NEUT #: 4.6 K/UL (ref 1.3–9.1)
AVERAGE GLUCOSE: 160 MG/DL (ref 66–114)
BASOPHILS RELATIVE PERCENT: 1.1 %
EOSINOPHILS RELATIVE PERCENT: 3.8 %
HBA1C MFR BLD: 7.2 %
HCT VFR BLD CALC: 41.5 % (ref 41.4–51)
HEMOGLOBIN: 14.4 G/DL (ref 13.8–17)
LYMPHOCYTE %: 28 %
MCH RBC QN AUTO: 29.9 PG (ref 27–34)
MCHC RBC AUTO-ENTMCNC: 34.7 G/DL (ref 31–36)
MCV RBC AUTO: 86.1 FL (ref 80–100)
MONOCYTES # BLD: 6.7 %
NEUTROPHILS RELATIVE PERCENT: 60.3 %
PDW BLD-RTO: 12.8 % (ref 10.8–14.8)
PLATELETS: 214 K/UL (ref 150–450)
RBC: 4.82 M/UL (ref 4–5.5)
WBC: 7.6 K/UL (ref 3.7–10.8)

## 2019-02-20 LAB
ALT SERPL-CCNC: 34 U/L (ref 5–50)
ANION GAP SERPL CALCULATED.3IONS-SCNC: 14 MEQ/L (ref 10–19)
AST SERPL-CCNC: 22 U/L (ref 9–50)
BUN BLDV-MCNC: 17 MG/DL (ref 8–23)
CALCIUM SERPL-MCNC: 9.1 MG/DL (ref 8.5–10.5)
CHLORIDE BLD-SCNC: 106 MEQ/L (ref 95–107)
CHOLESTEROL/HDL RATIO: 5.1
CHOLESTEROL: 204 MG/DL
CO2: 22 MEQ/L (ref 19–31)
CREAT SERPL-MCNC: 1.3 MG/DL (ref 0.8–1.4)
CREATINE, URINE: 156.7 MG/DL (ref 39–259)
EGFR AFRICAN AMERICAN: 64.1 ML/MIN/1.73 M2
EGFR IF NONAFRICAN AMERICAN: 55.3 ML/MIN/1.73 M2
GLUCOSE: 145 MG/DL (ref 70–99)
HDLC SERPL-MCNC: 40 MG/DL
HIGH SENSITIVE C-REACTIVE PROTEIN: 5.83 MG/L
LDL CHOLESTEROL CALCULATED: 96 MG/DL
LDL/HDL RATIO: 2.4
MICROALBUMIN/CREAT 24H UR: 44.9 MG/DL (ref 1.2–40)
MICROALBUMIN/CREAT UR-RTO: 287 MG/G
POTASSIUM SERPL-SCNC: 4.2 MEQ/L (ref 3.5–5.4)
SODIUM BLD-SCNC: 142 MEQ/L (ref 135–146)
TRIGL SERPL-MCNC: 342 MG/DL
VLDLC SERPL CALC-MCNC: 68 MG/DL

## 2019-02-27 ENCOUNTER — OFFICE VISIT (OUTPATIENT)
Dept: FAMILY MEDICINE CLINIC | Age: 69
End: 2019-02-27
Payer: MEDICARE

## 2019-02-27 VITALS
SYSTOLIC BLOOD PRESSURE: 128 MMHG | BODY MASS INDEX: 33.52 KG/M2 | HEIGHT: 65 IN | WEIGHT: 201.2 LBS | DIASTOLIC BLOOD PRESSURE: 82 MMHG

## 2019-02-27 DIAGNOSIS — E78.5 HYPERLIPIDEMIA, UNSPECIFIED HYPERLIPIDEMIA TYPE: ICD-10-CM

## 2019-02-27 DIAGNOSIS — R55 VASODEPRESSOR SYNCOPE: ICD-10-CM

## 2019-02-27 DIAGNOSIS — I10 ESSENTIAL HYPERTENSION: ICD-10-CM

## 2019-02-27 DIAGNOSIS — E66.09 OBESITY DUE TO EXCESS CALORIES, UNSPECIFIED CLASSIFICATION, UNSPECIFIED WHETHER SERIOUS COMORBIDITY PRESENT: ICD-10-CM

## 2019-02-27 PROCEDURE — G8484 FLU IMMUNIZE NO ADMIN: HCPCS | Performed by: FAMILY MEDICINE

## 2019-02-27 PROCEDURE — 2022F DILAT RTA XM EVC RTNOPTHY: CPT | Performed by: FAMILY MEDICINE

## 2019-02-27 PROCEDURE — 99214 OFFICE O/P EST MOD 30 MIN: CPT | Performed by: FAMILY MEDICINE

## 2019-02-27 PROCEDURE — 1101F PT FALLS ASSESS-DOCD LE1/YR: CPT | Performed by: FAMILY MEDICINE

## 2019-02-27 PROCEDURE — 3017F COLORECTAL CA SCREEN DOC REV: CPT | Performed by: FAMILY MEDICINE

## 2019-02-27 PROCEDURE — 1036F TOBACCO NON-USER: CPT | Performed by: FAMILY MEDICINE

## 2019-02-27 PROCEDURE — G8417 CALC BMI ABV UP PARAM F/U: HCPCS | Performed by: FAMILY MEDICINE

## 2019-02-27 PROCEDURE — 4040F PNEUMOC VAC/ADMIN/RCVD: CPT | Performed by: FAMILY MEDICINE

## 2019-02-27 PROCEDURE — 1123F ACP DISCUSS/DSCN MKR DOCD: CPT | Performed by: FAMILY MEDICINE

## 2019-02-27 PROCEDURE — 3045F PR MOST RECENT HEMOGLOBIN A1C LEVEL 7.0-9.0%: CPT | Performed by: FAMILY MEDICINE

## 2019-02-27 PROCEDURE — G8427 DOCREV CUR MEDS BY ELIG CLIN: HCPCS | Performed by: FAMILY MEDICINE

## 2019-02-27 ASSESSMENT — PATIENT HEALTH QUESTIONNAIRE - PHQ9
SUM OF ALL RESPONSES TO PHQ9 QUESTIONS 1 & 2: 0
SUM OF ALL RESPONSES TO PHQ QUESTIONS 1-9: 0
1. LITTLE INTEREST OR PLEASURE IN DOING THINGS: 0
2. FEELING DOWN, DEPRESSED OR HOPELESS: 0
SUM OF ALL RESPONSES TO PHQ QUESTIONS 1-9: 0

## 2019-03-18 ENCOUNTER — HOSPITAL ENCOUNTER (OUTPATIENT)
Dept: OCCUPATIONAL THERAPY | Age: 69
Setting detail: THERAPIES SERIES
Discharge: HOME | End: 2019-03-18
Payer: MEDICARE

## 2019-03-18 ENCOUNTER — OFFICE VISIT (OUTPATIENT)
Dept: FAMILY MEDICINE CLINIC | Age: 69
End: 2019-03-18
Payer: MEDICARE

## 2019-03-18 DIAGNOSIS — Z12.12 SCREENING FOR COLORECTAL CANCER: Primary | ICD-10-CM

## 2019-03-18 DIAGNOSIS — Z12.11 SCREENING FOR COLORECTAL CANCER: Primary | ICD-10-CM

## 2019-03-18 LAB
CONTROL: PRESENT
HEMOCCULT STL QL: POSITIVE

## 2019-03-18 PROCEDURE — 97166 OT EVAL MOD COMPLEX 45 MIN: CPT

## 2019-03-18 PROCEDURE — 82274 ASSAY TEST FOR BLOOD FECAL: CPT | Performed by: FAMILY MEDICINE

## 2019-03-18 PROCEDURE — 97110 THERAPEUTIC EXERCISES: CPT

## 2019-03-18 PROCEDURE — 97140 MANUAL THERAPY 1/> REGIONS: CPT

## 2019-03-19 NOTE — PROGRESS NOTES
Patient presents to the office with his OC Light Fit kit. Specimen was tested and was noted to be positive. Wife was notified of referral and positive results.

## 2019-03-20 ENCOUNTER — TELEPHONE (OUTPATIENT)
Dept: GASTROENTEROLOGY | Age: 69
End: 2019-03-20

## 2019-03-20 ENCOUNTER — HOSPITAL ENCOUNTER (OUTPATIENT)
Dept: OCCUPATIONAL THERAPY | Age: 69
Setting detail: THERAPIES SERIES
Discharge: HOME | End: 2019-03-20
Payer: MEDICARE

## 2019-03-20 DIAGNOSIS — R19.5 POSITIVE FIT (FECAL IMMUNOCHEMICAL TEST): Primary | ICD-10-CM

## 2019-03-20 PROCEDURE — 97140 MANUAL THERAPY 1/> REGIONS: CPT

## 2019-03-20 PROCEDURE — 97018 PARAFFIN BATH THERAPY: CPT

## 2019-03-20 NOTE — TELEPHONE ENCOUNTER
Direct Endoscopy Referral       Fax to [phone] or call the [address] at [address] [facility]  1950  [phone] There is no work phone number on file.     Brandi [address]    Referring Provider: Lazarus Ruvalcaba MD   [address] [facility] [address] [phone]  Special Requests: PLEASE CALL AND SCHEDULE PATIENT    Check Requested Procedures:    [x]  Colonoscopy (Please check test type and diagnosis below)    []  CPT Code:  Screening Average Risk     []  CPT Code:  Screening High Risk                 [x]  CPT Code :  Diagnostic Colonoscopy 49018    []  Personal History of colon cancer (Date of surgery)               []  Personal History of colon polyps (Date of surgery)     []  Family history of colon cancer ([facility])     []  Family history of colon polyps (1st degree relative - [facility])     []  Abnormal barium enema or CT (Please attach report)    []  Change in bowel habits     []  Occult GI bleeding     []  Melena with negative EGD     []  Iron deficiency anemia    [x]  Other:  Positive fit kit    []  EGD (Please check test type and diagnosis below)     []  CPT Code: EGD 43881    []  Melena     []  Dyspepsia/GERD     []  Dysphagia     []  Epigastric pain unresponsive to treatment     []  Iron deficiency anemia with negative colonoscopy     []  Occult GI bleeding with negative colonoscopy     []  Abnormal UGI, x-ray, or CT (attach report)    [] Other:    HISTORY:  Past Medical History:   Diagnosis Date    Benign hematuria     Diabetes mellitus ([facility] [address] 02/09/2013    A1C 7.2    Esophageal reflux 7/2001    Gastritis     GERD (gastroesophageal reflux disease)     Hematuria     Hyperglycemia 9/1999    Hyperlipidemia     Hypertension     Obesity, unspecified 2/2/2015    Other abnormal glucose 2/2/2015    Other and unspecified hyperlipidemia 7/1992    Renal cyst     Syncope and collapse 2/2/2015    Type II or unspecified type diabetes mellitus without mention of complication, not stated as uncontrolled 2012    Unspecified essential hypertension 10/1991    borderline    Vasodepressor syncope 9/2006     Patient has no known allergies. Prior to Visit Medications    Medication Sig Taking? Authorizing Provider   Dulaglutide (TRULICITY) 1.5 CS/6.4AN SOPN Inject 1.5 mg into the skin once a week  Trip Madden MD   lisinopril (PRINIVIL;ZESTRIL) 2.5 MG tablet TAKE ONE TABLET BY MOUTH DAILY  Trip Madden MD   metFORMIN (GLUCOPHAGE-XR) 500 MG extended release tablet TAKE ONE TABLET BY MOUTH DAILY WITH BREAKFAST  Trip Madden MD   CINNAMON PO Take 1 tablet by mouth 3 times daily   Historical Provider, MD   LUTEIN-ZEAXANTHIN PO Take 1 tablet by mouth daily  Historical Provider, MD   therapeutic multivitamin-minerals (THERAGRAN-M) tablet Take 1 tablet by mouth daily. Historical Provider, MD   vitamin D (CHOLECALCIFEROL) 1000 UNIT TABS tablet Take 1,000 Units by mouth daily.     Historical Provider, MD         Electronically signed by Trip Madden MD on 3/20/19 at 9:40 AM

## 2019-03-21 RX ORDER — SODIUM, POTASSIUM,MAG SULFATES 17.5-3.13G
SOLUTION, RECONSTITUTED, ORAL ORAL
Qty: 2 BOTTLE | Refills: 0 | Status: ON HOLD | OUTPATIENT
Start: 2019-03-21 | End: 2019-05-13 | Stop reason: HOSPADM

## 2019-03-22 ENCOUNTER — HOSPITAL ENCOUNTER (OUTPATIENT)
Dept: OCCUPATIONAL THERAPY | Age: 69
Setting detail: THERAPIES SERIES
Discharge: HOME | End: 2019-03-22
Payer: MEDICARE

## 2019-03-22 PROCEDURE — 97140 MANUAL THERAPY 1/> REGIONS: CPT

## 2019-03-22 PROCEDURE — 97022 WHIRLPOOL THERAPY: CPT

## 2019-03-25 ENCOUNTER — HOSPITAL ENCOUNTER (OUTPATIENT)
Dept: OCCUPATIONAL THERAPY | Age: 69
Setting detail: THERAPIES SERIES
Discharge: HOME | End: 2019-03-25
Payer: MEDICARE

## 2019-03-25 PROCEDURE — 97140 MANUAL THERAPY 1/> REGIONS: CPT

## 2019-03-25 PROCEDURE — 97022 WHIRLPOOL THERAPY: CPT

## 2019-03-27 ENCOUNTER — HOSPITAL ENCOUNTER (OUTPATIENT)
Dept: OCCUPATIONAL THERAPY | Age: 69
Setting detail: THERAPIES SERIES
Discharge: HOME | End: 2019-03-27
Payer: MEDICARE

## 2019-03-27 PROCEDURE — 97140 MANUAL THERAPY 1/> REGIONS: CPT

## 2019-03-27 PROCEDURE — 97022 WHIRLPOOL THERAPY: CPT

## 2019-03-29 ENCOUNTER — HOSPITAL ENCOUNTER (OUTPATIENT)
Dept: OCCUPATIONAL THERAPY | Age: 69
Setting detail: THERAPIES SERIES
Discharge: HOME | End: 2019-03-29
Payer: MEDICARE

## 2019-03-29 PROCEDURE — 97022 WHIRLPOOL THERAPY: CPT

## 2019-03-29 PROCEDURE — 97140 MANUAL THERAPY 1/> REGIONS: CPT

## 2019-03-29 PROCEDURE — 97110 THERAPEUTIC EXERCISES: CPT

## 2019-04-01 ENCOUNTER — HOSPITAL ENCOUNTER (OUTPATIENT)
Dept: OCCUPATIONAL THERAPY | Age: 69
Setting detail: THERAPIES SERIES
Discharge: HOME | End: 2019-04-01
Payer: MEDICARE

## 2019-04-01 PROCEDURE — 97022 WHIRLPOOL THERAPY: CPT

## 2019-04-01 PROCEDURE — 97140 MANUAL THERAPY 1/> REGIONS: CPT

## 2019-04-01 NOTE — PROGRESS NOTES
Phone: Memo    Fax: [phone]                       Outpatient Occupational Therapy                 DAILY TREATMENT NOTE    Date: 4/1/2019  Patients Name:  Lebron Meier  YOB: 1950 (68 y.o.)  Gender:  male  MRN:  [id number]  [facility] #: [id number]  Medical Diagnosis: Fracture of distal end of L radius, S52.592D    Referring Practitioner: Reg Coats MD     INSURANCE  OT Insurance Information: Medicare       Total # of Visits to Date: 7       PAIN  []No     [x]Yes      Location:  L wrist  Pain Rating (0-10 pain scale): 5/10  Pain Description:  soreness    SUBJECTIVE   Continued reports of soreness in mid wrist       Flow Sheet  Exercise /   Manual treatment Weight/  Level Reps/Time Comments    Joint mobilization  x   L wrist and digits to improve ROM   Joint distractions x   L wrist and digits to improve ROM   PROM x   L wrist and digits to improve ROM    Retrograde massage x   L digits and wrist to decrease swelling   Scar massage    L FA incision to improve mobilization and scar tissue management.    Putty   L hand pinches and  to increase fxl strength                                                                                                                                                      Modality Flow Sheet:  START STOP Tx Modality       Electrical Stim:          Ultrasound: ___ W/cm2 x ___ mins  Duty factor: __100%  __50%  __20% __10%  Head size:   MHz: __1mHz __2 mHz  __3mHz  Location:       Hot Pack: L hand/wrist for pain and stiffness. Patient tolerated well with skin intact pre and post treatment.       Paraffin: L hand/wrist for pain and stiffness. Patient tolerated well with skin intact pre and post treatment.      15 minutes Fluidotherapy: L hand/wrist for stiffness, pain. Mod agitation, mod heat.  Patient tolerated well with skin intact pre and post treatment.      GOALS/ TREATMENT SESSION:      Subjective report:       Time Frame for Long term goals : 6 weeks       Long term goal 1: Patient to state <20% impairment throughout daily tasks, as measured by the DASH. Continue []Met  [x]Partially met  []Not met   Long term goal 2: Patient to improve L wrist flexion to 50, extension to 55, UD to 24, RD to 25 and supination to 65. Continue []Met  [x]Partially met  []Not met   Long term goal 3: Patient to improve L  to 45#, 2 pt to 5#, 3 pt to 6# and lateral to 7#. Continue []Met  [x]Partially met  []Not met   Long term goal 4: Patient to demonstrate decreased edema LIF to 7.0, LLF to 6.4, LRF to 6.4, LSF to 5.9, L thumb to 6.5, L MCP's to 19.5, and L wrist to 16.5 cm. Continue - see below  Met LIF  []Met  [x]Partially met  []Not met   Time Frame for Short term goals: 4 weeks       Short term goal 1: Patient to demonstrate independence c HEP. MET  [x]Met  []Partially met  []Not met   Short term goal 2: Patient to improve MARTINEZ L digits >220.  Continue   Met LLF []Met  [x]Partially met  []Not met   Short term goal 3: Patient to improve L thumb MP to 55, IP to 49, PAB to 15.5 cm and RAB to 13.5 Continue []Met  [x]Partially met  []Not met   ADDITIONAL COMMENTS         EDEMA (measured in cm) LEFT   Index P1 7.0   Long P1 6.8   Ring P1 6.5   Small P1 6.4   Thumb P1 6.7   MCP's 19.8   Wrist 18.0        EDUCATION  New Education provided to patient/family/caregiver:    []Yes:     [x]No (Continued review of prior education)   If yes Education Provided:     Method of Education:     [x]Discussion     []Demonstration    [] Written     []Other  Evaluation of Patients Response to Education:         [x]Patient and or caregiver verbalized understanding  []Patient and or Caregiver Demonstrated without assistance   []Patient and or Caregiver Demonstrated with assistance  []Needs additional instruction to demonstrate understanding of education    ASSESSMENT  Patient tolerated todays treatment session:    [x] Good   []  Fair   [] Poor  Limitations/difficulties with treatment session due to:   []Pain     []Fatigue     []Other medical complications     []Other  Goal Assessment: [] No Change    [x]Improved  Comments:    Minutes Tracking:  Time In: 9198  Time Out: 1003  Minutes: 45        PLAN  [x]Continue with current plan of care  [][facility]  []IHold per patient request  [] Change Treatment plan:  [] Insurance hold  __ Other        Electronically signed by ALTAGRACIA Agustin OTR/L 4/1/2019 10:15 AM

## 2019-04-03 ENCOUNTER — HOSPITAL ENCOUNTER (OUTPATIENT)
Dept: OCCUPATIONAL THERAPY | Age: 69
Setting detail: THERAPIES SERIES
Discharge: HOME | End: 2019-04-03
Payer: MEDICARE

## 2019-04-03 PROCEDURE — 97140 MANUAL THERAPY 1/> REGIONS: CPT

## 2019-04-03 PROCEDURE — 97022 WHIRLPOOL THERAPY: CPT

## 2019-04-03 PROCEDURE — 97110 THERAPEUTIC EXERCISES: CPT

## 2019-04-05 ENCOUNTER — HOSPITAL ENCOUNTER (OUTPATIENT)
Dept: OCCUPATIONAL THERAPY | Age: 69
Setting detail: THERAPIES SERIES
Discharge: HOME | End: 2019-04-05
Payer: MEDICARE

## 2019-04-05 PROCEDURE — 97140 MANUAL THERAPY 1/> REGIONS: CPT

## 2019-04-05 PROCEDURE — 97022 WHIRLPOOL THERAPY: CPT

## 2019-04-05 PROCEDURE — 97110 THERAPEUTIC EXERCISES: CPT

## 2019-04-05 NOTE — PROGRESS NOTES
Phone: Memo    Fax: [phone]                       Outpatient Occupational Therapy                 DAILY TREATMENT NOTE    Date: 4/5/2019  Patients Name:  Laurent Weeks  YOB: 1950 (68 y.o.)  Gender:  male  MRN:  [id number]  [facility] #: [id number]  Medical Diagnosis: Fracture of distal end of L radius, S52.592D    Referring Practitioner: Baudilio Trinidad MD     INSURANCE  OT Insurance Information: Medicare       Total # of Visits to Date: 5       PAIN  [x]No     []Yes      Location:   Pain Rating (0-10 pain scale):   Pain Description: achy at times, sharp with act    SUBJECTIVE   Pt reports pain only with act. Flow Sheet  Exercise /   Manual treatment Weight/  Level Reps/Time Comments    Joint mobilization  x x  L wrist and digits to improve ROM   Joint distractions x x  L wrist and digits to improve ROM   PROM x x  L wrist and digits to improve ROM, Mod pain with SUP and FLEX    Retrograde massage    L digits and wrist to decrease swelling   Scar massage     L FA incision to improve mobilization and scar tissue management.    Putty yellow  X 2 min each  L hand pinches and  to increase fxl strength     Maze      To improve wrist ROM     Patient instructed to download tilt maze/Labyrinth cady on smart phone to improve wrist ROM. Patient completed x 5 min                                                                                                                                           Modality Flow Sheet:  START STOP Tx Modality       Electrical Stim:          Ultrasound: ___ W/cm2 x ___ mins  Duty factor: __100%  __50%  __20% __10%  Head size:   MHz: __1mHz __2 mHz  __3mHz  Location:       Hot Pack: L hand/wrist for pain and stiffness. Patient tolerated well with skin intact pre and post treatment.       Paraffin: L hand/wrist for pain and stiffness.  Patient tolerated well with skin intact pre and post treatment.      15 minutes Fluidotherapy: L hand/wrist for stiffness, pain. Mod agitation, mod heat. Patient tolerated well with skin intact pre and post treatment.      GOALS/ TREATMENT SESSION:      Subjective report:       Time Frame for Long term goals : 6 weeks       Long term goal 1: Patient to state <20% impairment throughout daily tasks, as measured by the DASH. Continue []Met  [x]Partially met  []Not met   Long term goal 2: Patient to improve L wrist flexion to 50, extension to 55, UD to 24, RD to 25 and supination to 65. Continue []Met  [x]Partially met  []Not met   Long term goal 3: Patient to improve L  to 45#, 2 pt to 5#, 3 pt to 6# and lateral to 7#. Continue- see chart below []Met  [x]Partially met  []Not met   Long term goal 4: Patient to demonstrate decreased edema LIF to 7.0, LLF to 6.4, LRF to 6.4, LSF to 5.9, L thumb to 6.5, L MCP's to 19.5, and L wrist to 16.5 cm. Continue   Met LIF  []Met  [x]Partially met  []Not met   Time Frame for Short term goals: 4 weeks       Short term goal 1: Patient to demonstrate independence c HEP. MET  [x]Met  []Partially met  []Not met   Short term goal 2: Patient to improve MARTINEZ L digits >220.  Continue   Met LLF []Met  [x]Partially met  []Not met   Short term goal 3: Patient to improve L thumb MP to 55, IP to 49, PAB to 15.5 cm and RAB to 13.5 Continue []Met  [x]Partially met  []Not met   ADDITIONAL COMMENTS            /PINCH STRENGTH  (measured in #) LEFT    22   2 pt pinch 2   3 pt pinch 4   Key/lateral pinch 4             EDUCATION  New Education provided to patient/family/caregiver:    []Yes:     [x]No (Continued review of prior education)   If yes Education Provided:     Method of Education:     [x]Discussion     [x]Demonstration    [] Written     []Other  Evaluation of Patients Response to Education:         [x]Patient and or caregiver verbalized understanding  []Patient and or Caregiver Demonstrated without assistance   []Patient and or Caregiver Demonstrated with assistance  []Needs

## 2019-04-08 ENCOUNTER — HOSPITAL ENCOUNTER (OUTPATIENT)
Dept: OCCUPATIONAL THERAPY | Age: 69
Setting detail: THERAPIES SERIES
Discharge: HOME | End: 2019-04-08
Payer: MEDICARE

## 2019-04-08 PROCEDURE — 97022 WHIRLPOOL THERAPY: CPT

## 2019-04-08 PROCEDURE — 97140 MANUAL THERAPY 1/> REGIONS: CPT

## 2019-04-08 PROCEDURE — 97110 THERAPEUTIC EXERCISES: CPT

## 2019-04-08 NOTE — PROGRESS NOTES
Phone: Memo    Fax: [phone]                       Outpatient Occupational Therapy                 DAILY TREATMENT NOTE    Date: 4/8/2019  Patients Name:  Deo Luo  YOB: 1950 (68 y.o.)  Gender:  male  MRN:  [id number]  [facility] #: [id number]  Medical Diagnosis: Fracture of distal end of L radius, S52.592D    Referring Practitioner: Lily Gomez MD     INSURANCE  OT Insurance Information: Medicare       Total # of Visits to Date: 10       PAIN  [x]No     []Yes      Location:   Pain Rating (0-10 pain scale):   Pain Description: occ sharp pain with over use. SUBJECTIVE   F/u  Wednesday with surgeon      Flow Sheet  Exercise /   Manual treatment Weight/  Level Reps/Time Comments    Joint mobilization  x x  L wrist and digits to improve ROM   Joint distractions x x  L wrist and digits to improve ROM   PROM x x  L wrist and digits to improve ROM    Retrograde massage x   L digits and wrist to decrease swelling   Scar massage     L FA incision to improve mobilization and scar tissue management.    Putty  yellow X 2 min each  L hand pinches and  to increase fxl strength     Maze   To improve wrist ROM     Patient instructed to download tilt maze/Labyrinth cady on smart phone to improve wrist ROM. Patient completed x 5 min                                                                                                                                           Modality Flow Sheet:  START STOP Tx Modality       Electrical Stim:          Ultrasound: ___ W/cm2 x ___ mins  Duty factor: __100%  __50%  __20% __10%  Head size:   MHz: __1mHz __2 mHz  __3mHz  Location:       Hot Pack: L hand/wrist for pain and stiffness. Patient tolerated well with skin intact pre and post treatment.       Paraffin: L hand/wrist for pain and stiffness.  Patient tolerated well with skin intact pre and post treatment.      15 minutes Fluidotherapy: L hand/wrist for stiffness, treatment session:    [x] Good   []  Fair   []  Poor  Limitations/difficulties with treatment session due to:   []Pain     []Fatigue     []Other medical complications     []Other  Goal Assessment: [] No Change    [x]Improved  Comments:    Minutes Tracking:    IN: 3:15pm  OUT: 3:58pm  43 min                PLAN  [x]Continue with current plan of care  [][facility]  []IHold per patient request  [] Change Treatment plan:  [] Insurance hold  __ Other        Electronically signed by GEOFFREY Young  4/8/2019 3:14 PM

## 2019-04-09 ENCOUNTER — HOSPITAL ENCOUNTER (OUTPATIENT)
Dept: OCCUPATIONAL THERAPY | Age: 69
Setting detail: THERAPIES SERIES
Discharge: HOME | End: 2019-04-09
Payer: MEDICARE

## 2019-04-09 PROCEDURE — 97022 WHIRLPOOL THERAPY: CPT

## 2019-04-09 PROCEDURE — 97140 MANUAL THERAPY 1/> REGIONS: CPT

## 2019-04-09 PROCEDURE — 97110 THERAPEUTIC EXERCISES: CPT

## 2019-04-09 NOTE — PROGRESS NOTES
treatment.      15 minutes Fluidotherapy: L hand/wrist for stiffness, pain. Mod agitation, mod heat. Patient tolerated well with skin intact pre and post treatment.      GOALS/ TREATMENT SESSION:      Subjective report:       Time Frame for Long term goals : 6 weeks       Long term goal 1: Patient to state <20% impairment throughout daily tasks, as measured by the DASH. Continue []Met  [x]Partially met  []Not met   Long term goal 2: Patient to improve L wrist flexion to 50, extension to 55, UD to 24, RD to 25 and supination to 65. Continue - see below    Met RD this date []Met  [x]Partially met  []Not met   Long term goal 3: Patient to improve L  to 45#, 2 pt to 5#, 3 pt to 6# and lateral to 7#. Continue - see below []Met  [x]Partially met  []Not met   Long term goal 4: Patient to demonstrate decreased edema LIF to 7.0, LLF to 6.4, LRF to 6.4, LSF to 5.9, L thumb to 6.5, L MCP's to 19.5, and L wrist to 16.5 cm. Continue - see below  Met LIF, thumb and MCP  []Met  [x]Partially met  []Not met   Time Frame for Short term goals: 4 weeks       Short term goal 1: Patient to demonstrate independence c HEP. MET  [x]Met  []Partially met  []Not met   Short term goal 2: Patient to improve MARTINEZ L digits >220.  Continue   Met all but small finger []Met  [x]Partially met  []Not met   Short term goal 3: Patient to improve L thumb MP to 55, IP to 49, PAB to 15.5 cm and RAB to 13.5 Continue  MP 49- MET  IP 59- MET  RAB 13.6- MET  PAB 14.0- continue []Met  [x]Partially met  []Not met   ADDITIONAL COMMENTS        LEFT HAND Index Long Ring Small   MP 0-75 0-73 0-70 0-75   PIP 0-90 0-90 0-91 0-80   DIP 0-60 0-60 0-60 15-65   MARTINEZ 225 223 221 205     EDEMA (measured in cm) LEFT   Index P1 6.9   Long P1 6.8   Ring P1 6.6   Small P1 6.0   Thumb P1 6.5   MCP's 19.5   Wrist 17.8       /PINCH STRENGTH  (measured in #) LEFT    29   2 pt pinch 2   3 pt pinch 3   Key/lateral pinch 4       WRIST AROM Left   Wrist Flexion 45   Wrist

## 2019-04-11 ENCOUNTER — HOSPITAL ENCOUNTER (OUTPATIENT)
Dept: OCCUPATIONAL THERAPY | Age: 69
Setting detail: THERAPIES SERIES
Discharge: HOME | End: 2019-04-11
Payer: MEDICARE

## 2019-04-11 PROCEDURE — 97022 WHIRLPOOL THERAPY: CPT

## 2019-04-11 PROCEDURE — 97140 MANUAL THERAPY 1/> REGIONS: CPT

## 2019-04-11 PROCEDURE — 97110 THERAPEUTIC EXERCISES: CPT

## 2019-04-11 NOTE — PROGRESS NOTES
Phone: Memo    Fax: [phone]                       Outpatient Occupational Therapy                 DAILY TREATMENT NOTE    Date: 4/11/2019  Patients Name:  Fritz Cooley  YOB: 1950 (68 y.o.)  Gender:  male  MRN:  [id number]  [facility] #: [id number]  Medical Diagnosis: Fracture of distal end of L radius, S52.592D    Referring Practitioner: Tanika Orlando MD     INSURANCE  OT Insurance Information: Medicare       Total # of Visits to Date: 15       PAIN  [x]No     []Yes      Location:   Pain Rating (0-10 pain scale):   Pain Description:     SUBJECTIVE   Patient reports that dr winn went well. Splint was d/c. Patient able to lift up to 5#      Flow Sheet  Exercise /   Manual treatment Weight/  Level Reps/Time Comments    Joint mobilization  x x  L wrist and digits to improve ROM   Joint distractions x x  L wrist and digits to improve ROM   PROM x x  L wrist and digits to improve ROM    Retrograde massage  x   L digits and wrist to decrease swelling   Scar massage  x   L FA incision to improve mobilization and scar tissue management.    Putty  yellow X 2 min each  L hand pinches and  to increase fxl strength     Maze x  x10 To improve wrist ROM       Powerweb   MP flexion and extension to improve strength    Pro/Sup Wand 1 weight x20 Sup and pronation to improve wrist strength and ROM    Flexbar Yellow x15-20 Twist and bend to improve ROM                                                                                                             Modality Flow Sheet:  START STOP Tx Modality       Electrical Stim:          Ultrasound: ___ W/cm2 x ___ mins  Duty factor: __100%  __50%  __20% __10%  Head size:   MHz: __1mHz __2 mHz  __3mHz  Location:       Hot Pack: L hand/wrist for pain and stiffness. Patient tolerated well with skin intact pre and post treatment.       Paraffin: L hand/wrist for pain and stiffness.  Patient tolerated well with skin intact pre and post treatment.      15 minutes Fluidotherapy: L hand/wrist for stiffness, pain. Mod agitation, mod heat. Patient tolerated well with skin intact pre and post treatment.      GOALS/ TREATMENT SESSION:      Subjective report:       Time Frame for Long term goals : 6 weeks       Long term goal 1: Patient to state <20% impairment throughout daily tasks, as measured by the DASH. Continue []Met  [x]Partially met  []Not met   Long term goal 2: Patient to improve L wrist flexion to 50, extension to 55, UD to 24, RD to 25 and supination to 65. Continue      Met RD this date []Met  [x]Partially met  []Not met   Long term goal 3: Patient to improve L  to 45#, 2 pt to 5#, 3 pt to 6# and lateral to 7#. Continue  []Met  [x]Partially met  []Not met   Long term goal 4: Patient to demonstrate decreased edema LIF to 7.0, LLF to 6.4, LRF to 6.4, LSF to 5.9, L thumb to 6.5, L MCP's to 19.5, and L wrist to 16.5 cm. Continue   Met LIF, thumb and MCP  []Met  [x]Partially met  []Not met   Time Frame for Short term goals: 4 weeks       Short term goal 1: Patient to demonstrate independence c HEP. MET  [x]Met  []Partially met  []Not met   Short term goal 2: Patient to improve MARTINEZ L digits >220.  Continue   Met all but small finger []Met  [x]Partially met  []Not met   Short term goal 3: Patient to improve L thumb MP to 55, IP to 49, PAB to 15.5 cm and RAB to 13.5 Continue  MP- MET  IP - MET  RAB - MET  PAB - continue []Met  [x]Partially met  []Not met   ADDITIONAL COMMENTS                EDUCATION  New Education provided to patient/family/caregiver:    []Yes:     [x]No (Continued review of prior education)   If yes Education Provided:     Method of Education:     [x]Discussion     []Demonstration    [] Written     []Other  Evaluation of Patients Response to Education:         [x]Patient and or caregiver verbalized understanding  []Patient and or Caregiver Demonstrated without assistance   []Patient and or Caregiver Demonstrated with assistance  []Needs additional instruction to demonstrate understanding of education    ASSESSMENT  Patient tolerated todays treatment session:    [x] Good   []  Fair   []  Poor  Limitations/difficulties with treatment session due to:   []Pain     []Fatigue     []Other medical complications     []Other  Goal Assessment: [] No Change    [x]Improved  Comments:    Minutes Tracking:  Time In: 3055  Time Out: 5640  Minutes: 39        PLAN  [x]Continue with current plan of care  [][facility]  []IHold per patient request  [] Change Treatment plan:  [] Insurance hold  __ Other        Electronically signed by ALTAGRACIA Ferris, OTR/L 4/11/2019 1:22 PM

## 2019-04-15 ENCOUNTER — HOSPITAL ENCOUNTER (OUTPATIENT)
Dept: OCCUPATIONAL THERAPY | Age: 69
Setting detail: THERAPIES SERIES
Discharge: HOME | End: 2019-04-15
Payer: MEDICARE

## 2019-04-15 PROCEDURE — 97140 MANUAL THERAPY 1/> REGIONS: CPT

## 2019-04-15 PROCEDURE — 97110 THERAPEUTIC EXERCISES: CPT

## 2019-04-15 PROCEDURE — 97022 WHIRLPOOL THERAPY: CPT

## 2019-04-15 NOTE — PROGRESS NOTES
Phone: Memo    Fax: [phone]                       Outpatient Occupational Therapy                 DAILY TREATMENT NOTE    Date: 4/15/2019  Patients Name:  Bernice Velasquez  YOB: 1950 (68 y.o.)  Gender:  male  MRN:  [id number]  [facility] #: [id number]  Medical Diagnosis: Fracture of distal end of L radius, S52.592D    Referring Practitioner: Gomez Cortez MD     INSURANCE  OT Insurance Information: Medicare       Total # of Visits to Date: 15       PAIN  []No     [x]Yes      Location:  L hand/wrist  Pain Rating (0-10 pain scale): 8/10 with stretching  Pain Description:     SUBJECTIVE   Patient states that his hand had been more sore this past weekend. Flow Sheet  Exercise /   Manual treatment Weight/  Level Reps/Time Comments    Joint mobilization  x x  L wrist and digits to improve ROM   Joint distractions x x  L wrist and digits to improve ROM   PROM x x  L wrist and digits to improve ROM    Retrograde massage  x   L digits and wrist to decrease swelling   Scar massage  x   L FA incision to improve mobilization and scar tissue management.    Putty [place] X 3-4 min each  L hand pinches and  to increase fxl strength     Maze   To improve wrist ROM       Powerweb     MP flexion and extension to improve strength    Pro/Sup Wand 3 weights x20 Sup and pronation to improve wrist strength and ROM    Flexbar   Twist and bend to improve ROM                                                                                                             Modality Flow Sheet:  START STOP Tx Modality       Electrical Stim:          Ultrasound: ___ W/cm2 x ___ mins  Duty factor: __100%  __50%  __20% __10%  Head size:   MHz: __1mHz __2 mHz  __3mHz  Location:       Hot Pack: L hand/wrist for pain and stiffness. Patient tolerated well with skin intact pre and post treatment.       Paraffin: L hand/wrist for pain and stiffness.  Patient tolerated well with skin intact understanding  []Patient and or Caregiver Demonstrated without assistance   []Patient and or Caregiver Demonstrated with assistance  []Needs additional instruction to demonstrate understanding of education    ASSESSMENT  Patient tolerated todays treatment session:    [x] Good   []  Fair   []  Poor  Limitations/difficulties with treatment session due to:   []Pain     []Fatigue     []Other medical complications     []Other  Goal Assessment: [] No Change    [x]Improved  Comments: improved stretch    Minutes Tracking:  Time In: 0737  Time Out: 2196  Minutes: 52        PLAN  [x]Continue with current plan of care  [][facility]  []IHold per patient request  [] Change Treatment plan:  [] Insurance hold  __ Other        Electronically signed by Marilee Lennox, OTD, HALINAR/L 4/15/2019 1:15 PM

## 2019-04-17 ENCOUNTER — HOSPITAL ENCOUNTER (OUTPATIENT)
Dept: OCCUPATIONAL THERAPY | Age: 69
Setting detail: THERAPIES SERIES
Discharge: HOME | End: 2019-04-17
Payer: MEDICARE

## 2019-04-17 PROCEDURE — 97110 THERAPEUTIC EXERCISES: CPT

## 2019-04-17 PROCEDURE — 97140 MANUAL THERAPY 1/> REGIONS: CPT

## 2019-04-17 PROCEDURE — 97022 WHIRLPOOL THERAPY: CPT

## 2019-04-17 NOTE — PROGRESS NOTES
Phone: Memo    Fax: [phone]                       Outpatient Occupational Therapy                 DAILY TREATMENT NOTE    Date: 4/17/2019  Patients Name:  Talha Jenkins  YOB: 1950 (68 y.o.)  Gender:  male  MRN:  [id number]  [facility] #: [id number]  Medical Diagnosis: Fracture of distal end of L radius, S52.592D    Referring Practitioner: Baron lD MD     INSURANCE  OT Insurance Information: Medicare       Total # of Visits to Date: 15       PAIN  [x]No     []Yes      Location:   Pain Rating (0-10 pain scale):   Pain Description:     SUBJECTIVE   Patient states that he is having increased tightness at MCP's. Flow Sheet  Exercise /   Manual treatment Weight/  Level Reps/Time Comments    Joint mobilization  x x  L wrist and digits to improve ROM   Joint distractions x x  L wrist and digits to improve ROM   PROM x x  L wrist and digits to improve ROM    Retrograde massage  x   L digits and wrist to decrease swelling   Scar massage  x   L FA incision to improve mobilization and scar tissue management.    Putty   L hand pinches and  to increase fxl strength     Maze x           x5 To improve wrist ROM       Powerweb     MP flexion and extension to improve strength    Pro/Sup Wand 2 weights x20 Sup and pronation to improve wrist strength and ROM    Flexbar  Yellow x20 Twist to improve ROM    Hand [place] 3 band x20 To improve  strength                                                                                                   Modality Flow Sheet:  START STOP Tx Modality       Electrical Stim:          Ultrasound: ___ W/cm2 x ___ mins  Duty factor: __100%  __50%  __20% __10%  Head size:   MHz: __1mHz __2 mHz  __3mHz  Location:       Hot Pack: L hand/wrist for pain and stiffness. Patient tolerated well with skin intact pre and post treatment.       Paraffin: L hand/wrist for pain and stiffness.  Patient tolerated well with skin intact pre and post treatment.      15 minutes Fluidotherapy: L hand/wrist for stiffness, pain. Mod agitation, mod heat. Patient tolerated well with skin intact pre and post treatment.      GOALS/ TREATMENT SESSION:      Subjective report:       Time Frame for Long term goals : 6 weeks       Long term goal 1: Patient to state <20% impairment throughout daily tasks, as measured by the DASH. Continue []Met  [x]Partially met  []Not met   Long term goal 2: Patient to improve L wrist flexion to 50, extension to 55, UD to 24, RD to 25 and supination to 65. Continue   Flexion: 49  Extension: 50  UD: 26 - Met  RD: 30 - Met  Supination: 66 - Met []Met  [x]Partially met  []Not met   Long term goal 3: Patient to improve L  to 45#, 2 pt to 5#, 3 pt to 6# and lateral to 7#. Continue  []Met  [x]Partially met  []Not met   Long term goal 4: Patient to demonstrate decreased edema LIF to 7.0, LLF to 6.4, LRF to 6.4, LSF to 5.9, L thumb to 6.5, L MCP's to 19.5, and L wrist to 16.5 cm. Continue   Met LIF, thumb and MCP  []Met  [x]Partially met  []Not met   Time Frame for Short term goals: 4 weeks       Short term goal 1: Patient to demonstrate independence c HEP. MET  [x]Met  []Partially met  []Not met   Short term goal 2: Patient to improve MARTINEZ L digits >220.  Continue   Met all but small finger []Met  [x]Partially met  []Not met   Short term goal 3: Patient to improve L thumb MP to 55, IP to 49, PAB to 15.5 cm and RAB to 13.5 Continue  MP- MET  IP - MET  RAB - MET  PAB - continue []Met  [x]Partially met  []Not met   ADDITIONAL COMMENTS           EDUCATION  New Education provided to patient/family/caregiver:    []Yes:     [x]No (Continued review of prior education)   If yes Education Provided:     Method of Education:     [x]Discussion     []Demonstration    [] Written     []Other  Evaluation of Patients Response to Education:         [x]Patient and or caregiver verbalized understanding  []Patient and or Caregiver Demonstrated without

## 2019-04-18 ENCOUNTER — HOSPITAL ENCOUNTER (OUTPATIENT)
Dept: OCCUPATIONAL THERAPY | Age: 69
Setting detail: THERAPIES SERIES
Discharge: HOME | End: 2019-04-18
Payer: MEDICARE

## 2019-04-18 PROCEDURE — 97140 MANUAL THERAPY 1/> REGIONS: CPT

## 2019-04-18 PROCEDURE — 97022 WHIRLPOOL THERAPY: CPT

## 2019-04-18 PROCEDURE — 97110 THERAPEUTIC EXERCISES: CPT

## 2019-04-18 NOTE — PROGRESS NOTES
Phone: Memo    Fax: [phone]                       Outpatient Occupational Therapy                 DAILY TREATMENT NOTE    Date: 4/18/2019  Patients Name:  Darleen Falcon  YOB: 1950 (68 y.o.)  Gender:  male  MRN:  [id number]  [facility] #: [id number]  Medical Diagnosis: Fracture of distal end of L radius, S52.592D    Referring Practitioner: Kei Fabian MD     INSURANCE  OT Insurance Information: Medicare       Total # of Visits to Date: 13       PAIN  [x]No     []Yes      Location:   Pain Rating (0-10 pain scale):   Pain Description:     SUBJECTIVE   Patient states he woke up with stiffness and swelling. Flow Sheet  Exercise /   Manual treatment Weight/  Level Reps/Time Comments    Joint mobilization  x x  L wrist and digits to improve ROM   Joint distractions x x  L wrist and digits to improve ROM   PROM x x  L wrist and digits to improve ROM    Retrograde massage     L digits and wrist to decrease swelling   Scar massage     L FA incision to improve mobilization and scar tissue management.    Putty Orange    L hand pinches and  to increase fxl strength     Maze x           x5 To improve wrist ROM       Powerweb  Yellow x20 MP flexion and extension to improve strength    Pro/Sup Wand   Sup and pronation to improve wrist strength and ROM    Flexbar   Twist to improve ROM    Hand [place]   To improve  strength                                                                                                   Modality Flow Sheet:  START STOP Tx Modality       Electrical Stim:          Ultrasound: ___ W/cm2 x ___ mins  Duty factor: __100%  __50%  __20% __10%  Head size:   MHz: __1mHz __2 mHz  __3mHz  Location:       Hot Pack: L hand/wrist for pain and stiffness. Patient tolerated well with skin intact pre and post treatment.       Paraffin: L hand/wrist for pain and stiffness.  Patient tolerated well with skin intact pre and post treatment.      15 minutes Fluidotherapy: L hand/wrist for stiffness, pain. Mod agitation, mod heat. Patient tolerated well with skin intact pre and post treatment.      GOALS/ TREATMENT SESSION:      Subjective report:       Time Frame for Long term goals : 6 weeks       Long term goal 1: Patient to state <20% impairment throughout daily tasks, as measured by the DASH. Continue []Met  [x]Partially met  []Not met   Long term goal 2: Patient to improve L wrist flexion to 50, extension to 55, UD to 24, RD to 25 and supination to 65. Continue    []Met  [x]Partially met  []Not met   Long term goal 3: Patient to improve L  to 45#, 2 pt to 5#, 3 pt to 6# and lateral to 7#. Continue    []Met  [x]Partially met  []Not met   Long term goal 4: Patient to demonstrate decreased edema LIF to 7.0, LLF to 6.4, LRF to 6.4, LSF to 5.9, L thumb to 6.5, L MCP's to 19.5, and L wrist to 16.5 cm. Continue   Met LIF, thumb and MCP  []Met  [x]Partially met  []Not met   Time Frame for Short term goals: 4 weeks       Short term goal 1: Patient to demonstrate independence c HEP. MET  [x]Met  []Partially met  []Not met   Short term goal 2: Patient to improve MARTINEZ L digits >220.  Continue   Met all but small finger []Met  [x]Partially met  []Not met   Short term goal 3: Patient to improve L thumb MP to 55, IP to 49, PAB to 15.5 cm and RAB to 13.5 Continue   []Met  [x]Partially met  []Not met   ADDITIONAL COMMENTS           /PINCH STRENGTH  (measured in #) LEFT    26   2 pt pinch 2   3 pt pinch 3   Key/lateral pinch 4              EDUCATION  New Education provided to patient/family/caregiver:    []Yes:     [x]No (Continued review of prior education)   If yes Education Provided:     Method of Education:     [x]Discussion     []Demonstration    [] Written     []Other  Evaluation of Patients Response to Education:         [x]Patient and or caregiver verbalized understanding  []Patient and or Caregiver Demonstrated without assistance   []Patient and or Caregiver Demonstrated with assistance  []Needs additional instruction to demonstrate understanding of education    ASSESSMENT  Patient tolerated todays treatment session:    [x] Good   []  Fair   []  Poor  Limitations/difficulties with treatment session due to:   []Pain     []Fatigue     []Other medical complications     []Other  Goal Assessment: [] No Change    [x]Improved  Comments:    Minutes Tracking:  Time In: 0368  Time Out: 1320  Minutes: 52        PLAN  [x]Continue with current plan of care  [][facility]  []IHold per patient request  [] Change Treatment plan:  [] Insurance hold  __ Other        Electronically signed by ALTAGRACIA Montoya, HALINAR/L 4/18/2019 1:51 PM

## 2019-04-22 ENCOUNTER — HOSPITAL ENCOUNTER (OUTPATIENT)
Dept: OCCUPATIONAL THERAPY | Age: 69
Setting detail: THERAPIES SERIES
Discharge: HOME | End: 2019-04-22
Payer: MEDICARE

## 2019-04-22 PROCEDURE — 97110 THERAPEUTIC EXERCISES: CPT

## 2019-04-22 PROCEDURE — 97022 WHIRLPOOL THERAPY: CPT

## 2019-04-22 PROCEDURE — 97140 MANUAL THERAPY 1/> REGIONS: CPT

## 2019-04-22 NOTE — PROGRESS NOTES
Phone: Memo    Fax: [phone]                       Outpatient Occupational Therapy                 DAILY TREATMENT NOTE    Date: 4/22/2019  Patients Name:  Huma Bowen  YOB: 1950 (68 y.o.)  Gender:  male  MRN:  [id number]  [facility] #: [id number]  Medical Diagnosis: Fracture of distal end of L radius, S52.592D    Referring Practitioner: James Self MD     INSURANCE  OT Insurance Information: Medicare       Total # of Visits to Date: 12       PAIN  []No     [x]Yes      Location: L hand dorsal aspect and radial area  Pain Rating (0-10 pain scale): minimal  Pain Description: achy    SUBJECTIVE         Flow Sheet  Exercise /   Manual treatment Weight/  Level Reps/Time Comments    Joint mobilization  x x  L wrist and digits to improve ROM   Joint distractions x x  L wrist and digits to improve ROM   PROM x x  L wrist and digits to improve ROM    Retrograde massage     L digits and wrist to decrease swelling   Scar massage     L FA incision to improve mobilization and scar tissue management.    Putty Orange    L hand pinches and  to increase fxl strength     Maze   To improve wrist ROM       Powerweb   MP flexion and extension to improve strength    Pro/Sup Wand     Sup and pronation to improve wrist strength and ROM    Flexbar     Twist to improve ROM    Hand [place]     To improve  strength                                                                                                   Modality Flow Sheet:  START STOP Tx Modality       Electrical Stim:          Ultrasound: ___ W/cm2 x ___ mins  Duty factor: __100%  __50%  __20% __10%  Head size:   MHz: __1mHz __2 mHz  __3mHz  Location:       Hot Pack: L hand/wrist for pain and stiffness. Patient tolerated well with skin intact pre and post treatment.       Paraffin: L hand/wrist for pain and stiffness.  Patient tolerated well with skin intact pre and post treatment.      15 minutes Fluidotherapy: tolerated todays treatment session:    [x] Good   []  Fair   []  Poor  Limitations/difficulties with treatment session due to:   []Pain     []Fatigue     []Other medical complications     []Other  Goal Assessment: [] No Change    [x]Improved  Comments:    Minutes Tracking:   in 9:15am  Out: 9:56am  41 min              PLAN  [x]Continue with current plan of care  [][facility]  []IHold per patient request  [] Change Treatment plan:  [] Insurance hold  __ Other        Electronically signed by GEOFFREY Nieves  4/22/2019 9:22 AM

## 2019-04-24 ENCOUNTER — HOSPITAL ENCOUNTER (OUTPATIENT)
Dept: OCCUPATIONAL THERAPY | Age: 69
Setting detail: THERAPIES SERIES
Discharge: HOME | End: 2019-04-24
Payer: MEDICARE

## 2019-04-24 PROCEDURE — 97110 THERAPEUTIC EXERCISES: CPT

## 2019-04-24 PROCEDURE — 97140 MANUAL THERAPY 1/> REGIONS: CPT

## 2019-04-24 PROCEDURE — 97018 PARAFFIN BATH THERAPY: CPT

## 2019-04-24 NOTE — PROGRESS NOTES
Phone: Memo    Fax: [phone]                       Outpatient Occupational Therapy                 DAILY TREATMENT NOTE    Date: 4/24/2019  Patients Name:  Marin Song  YOB: 1950 (68 y.o.)  Gender:  male  MRN:  [id number]  [facility] #: [id number]  Medical Diagnosis: Fracture of distal end of L radius, S52.592D    Referring Practitioner: Penny Richard MD     INSURANCE  OT Insurance Information: Medicare       Total # of Visits to Date: 16       PAIN  [x]No     []Yes      Location:   Pain Rating (0-10 pain scale):   Pain Description:     SUBJECTIVE   States that he continues to have pain. Patient wrist cracked during PROM. No increased pain stated. Flow Sheet  Exercise /   Manual treatment Weight/  Level Reps/Time Comments    Joint mobilization  x x  L wrist and digits to improve ROM   Joint distractions x x  L wrist and digits to improve ROM   PROM x x  L wrist and digits to improve ROM    Retrograde massage     L digits and wrist to decrease swelling   Scar massage     L FA incision to improve mobilization and scar tissue management.    Putty    L hand pinches and  to increase fxl strength     Maze     To improve wrist ROM       Powerweb Yellow  x20 MP flexion and extension and pull backs to improve strength    Pro/Sup Wand     Sup and pronation to improve wrist strength and ROM    Flexbar Yellow  x20 Twist and bend to improve strength    Hand [place]     To improve  strength                                                                                                   Modality Flow Sheet:  START STOP Tx Modality       Electrical Stim:          Ultrasound: ___ W/cm2 x ___ mins  Duty factor: __100%  __50%  __20% __10%  Head size:   MHz: __1mHz __2 mHz  __3mHz  Location:       Hot Pack: L hand/wrist for pain and stiffness.  Patient tolerated well with skin intact pre and post treatment.       Paraffin: L hand/wrist for pain and stiffness. Patient tolerated well with skin intact pre and post treatment.      15 minutes Fluidotherapy: L hand/wrist for stiffness, pain. Mod agitation, mod heat. Patient tolerated well with skin intact pre and post treatment.      GOALS/ TREATMENT SESSION:      Subjective report:       Time Frame for Long term goals : 6 weeks       Long term goal 1: Patient to state <20% impairment throughout daily tasks, as measured by the DASH. Continue []Met  [x]Partially met  []Not met   Long term goal 2: Patient to improve L wrist flexion to 50, extension to 55, UD to 24, RD to 25 and supination to 65. Continue     []Met  [x]Partially met  []Not met   Long term goal 3: Patient to improve L  to 45#, 2 pt to 5#, 3 pt to 6# and lateral to 7#. Continue     []Met  [x]Partially met  []Not met   Long term goal 4: Patient to demonstrate decreased edema LIF to 7.0, LLF to 6.4, LRF to 6.4, LSF to 5.9, L thumb to 6.5, L MCP's to 19.5, and L wrist to 16.5 cm. Continue   Met LIF, thumb and MCP  []Met  [x]Partially met  []Not met   Time Frame for Short term goals: 4 weeks       Short term goal 1: Patient to demonstrate independence c HEP.  MET  [x]Met  []Partially met  []Not met   Short term goal 2: Patient to improve MARTINEZ L digits >220.  Met   Small finger 229 [x]Met  []Partially met  []Not met   Short term goal 3: Patient to improve L thumb MP to 55, IP to 49, PAB to 15.5 cm and RAB to 13.5 Continue   PAB 13.5 []Met  [x]Partially met  []Not met   ADDITIONAL COMMENTS           EDUCATION  New Education provided to patient/family/caregiver:    []Yes:     [x]No (Continued review of prior education)   If yes Education Provided:     Method of Education:     [x]Discussion     []Demonstration    [] Written     []Other  Evaluation of Patients Response to Education:         [x]Patient and or caregiver verbalized understanding  []Patient and or Caregiver Demonstrated without assistance   []Patient and or Caregiver Demonstrated with assistance  []Needs additional instruction to demonstrate understanding of education    ASSESSMENT  Patient tolerated todays treatment session:    [x] Good   []  Fair   []  Poor  Limitations/difficulties with treatment session due to:   []Pain     []Fatigue     []Other medical complications     []Other  Goal Assessment: [] No Change    [x]Improved  Comments:    Minutes Tracking:  Time In: 1300  Time Out: 5328  Minutes: 52        PLAN  [x]Continue with current plan of care  [][facility]  []IHold per patient request  [] Change Treatment plan:  [] Insurance hold  __ Other        Electronically signed by ALTAGRACIA Doherty, OTR/L 4/24/2019 2:01 PM

## 2019-04-26 ENCOUNTER — HOSPITAL ENCOUNTER (OUTPATIENT)
Dept: OCCUPATIONAL THERAPY | Age: 69
Setting detail: THERAPIES SERIES
Discharge: HOME | End: 2019-04-26
Payer: MEDICARE

## 2019-04-26 PROCEDURE — 97140 MANUAL THERAPY 1/> REGIONS: CPT

## 2019-04-26 PROCEDURE — 97110 THERAPEUTIC EXERCISES: CPT

## 2019-04-26 PROCEDURE — 97022 WHIRLPOOL THERAPY: CPT

## 2019-04-26 NOTE — PROGRESS NOTES
for pain and stiffness. Patient tolerated well with skin intact pre and post treatment.      15 minutes Fluidotherapy: L hand/wrist for stiffness, pain. Mod agitation, mod heat. Patient tolerated well with skin intact pre and post treatment.      GOALS/ TREATMENT SESSION:      Subjective report:       Time Frame for Long term goals : 6 weeks       Long term goal 1: Patient to state <20% impairment throughout daily tasks, as measured by the DASH. Continue []Met  [x]Partially met  []Not met   Long term goal 2: Patient to improve L wrist flexion to 50, extension to 55, UD to 24, RD to 25 and supination to 65. Continue - see below  Met flexion, URD, RD  []Met  [x]Partially met  []Not met   Long term goal 3: Patient to improve L  to 45#, 2 pt to 5#, 3 pt to 6# and lateral to 7#. Continue     []Met  [x]Partially met  []Not met   Long term goal 4: Patient to demonstrate decreased edema LIF to 7.0, LLF to 6.4, LRF to 6.4, LSF to 5.9, L thumb to 6.5, L MCP's to 19.5, and L wrist to 16.5 cm. Continue   Met LIF, thumb and MCP  []Met  [x]Partially met  []Not met   Time Frame for Short term goals: 4 weeks       Short term goal 1: Patient to demonstrate independence c HEP.  MET  [x]Met  []Partially met  []Not met   Short term goal 2: Patient to improve MARTINEZ L digits >220.  Met    [x]Met  []Partially met  []Not met   Short term goal 3: Patient to improve L thumb MP to 55, IP to 49, PAB to 15.5 cm and RAB to 13.5 Continue   []Met  [x]Partially met  []Not met   ADDITIONAL COMMENTS          WRIST AROM Left   Wrist Flexion 50   Wrist Extension 51   Ulnar Deviation 26   Radial Deviation 26   Supination 60       EDUCATION  New Education provided to patient/family/caregiver:    []Yes:     [x]No (Continued review of prior education)   If yes Education Provided:     Method of Education:     [x]Discussion     []Demonstration    [] Written     []Other  Evaluation of Patients Response to Education:         [x]Patient and or caregiver verbalized understanding  []Patient and or Caregiver Demonstrated without assistance   []Patient and or Caregiver Demonstrated with assistance  []Needs additional instruction to demonstrate understanding of education    ASSESSMENT  Patient tolerated todays treatment session:    [x] Good   []  Fair   []  Poor  Limitations/difficulties with treatment session due to:   []Pain     []Fatigue     []Other medical complications     []Other  Goal Assessment: [] No Change    [x]Improved  Comments:    Minutes Tracking:  Time In: 1385  Time Out: 2255  Minutes: 52        PLAN  [x]Continue with current plan of care  [][facility]  []IHold per patient request  [] Change Treatment plan:  [] Insurance hold  __ Other        Electronically signed by ALTAGRACIA Chiang OTR/L 4/26/2019 9:46 AM

## 2019-04-29 ENCOUNTER — HOSPITAL ENCOUNTER (OUTPATIENT)
Dept: OCCUPATIONAL THERAPY | Age: 69
Setting detail: THERAPIES SERIES
Discharge: HOME | End: 2019-04-29
Payer: MEDICARE

## 2019-04-29 PROCEDURE — 97110 THERAPEUTIC EXERCISES: CPT

## 2019-04-29 PROCEDURE — 97140 MANUAL THERAPY 1/> REGIONS: CPT

## 2019-04-29 PROCEDURE — 97022 WHIRLPOOL THERAPY: CPT

## 2019-04-29 NOTE — PROGRESS NOTES
Phone: Memo    Fax: [phone]                       Outpatient Occupational Therapy                 DAILY TREATMENT NOTE    Date: 4/29/2019  Patients Name:  Bernice Velasquez  YOB: 1950 (68 y.o.)  Gender:  male  MRN:  [id number]  [facility] #: [id number]  Medical Diagnosis: Fracture of distal end of L radius, S52.592D    Referring Practitioner: Gomez Cortez MD     INSURANCE  OT Insurance Information: Medicare       Total # of Visits to Date: 23       PAIN  [x]No     []Yes      Location:   Pain Rating (0-10 pain scale):   Pain Description:     SUBJECTIVE   Pt reports pain \"on top\" of wrist with use., but is using the L wrist more. Flow Sheet  Exercise /   Manual treatment Weight/  Level Reps/Time Comments    Joint mobilization  x x  L wrist and digits to improve ROM   Joint distractions x x  L wrist and digits to improve ROM   PROM x x  L wrist and digits to improve ROM    Retrograde massage     L digits and wrist to decrease swelling   Scar massage     L FA incision to improve mobilization and scar tissue management.    Putty Orange  X 2 min each L hand pinches and  to increase fxl strength     Maze     To improve wrist ROM       Powerweb   MP flexion and extension and pull backs to improve strength    Pro/Sup Wand   Sup and pronation to improve wrist strength and ROM    Flexbar   Twist and bend to improve strength    Hand [place]     To improve  strength                                                                                                   Modality Flow Sheet:  START STOP Tx Modality       Electrical Stim:          Ultrasound: ___ W/cm2 x ___ mins  Duty factor: __100%  __50%  __20% __10%  Head size:   MHz: __1mHz __2 mHz  __3mHz  Location:       Hot Pack: L hand/wrist for pain and stiffness. Patient tolerated well with skin intact pre and post treatment.       Paraffin: L hand/wrist for pain and stiffness.  Patient tolerated well with skin intact pre and post treatment.      15 minutes Fluidotherapy: L hand/wrist for stiffness, pain. Mod agitation, mod heat. Patient tolerated well with skin intact pre and post treatment.      GOALS/ TREATMENT SESSION:      Subjective report:       Time Frame for Long term goals : 6 weeks       Long term goal 1: Patient to state <20% impairment throughout daily tasks, as measured by the DASH. Continue []Met  [x]Partially met  []Not met   Long term goal 2: Patient to improve L wrist flexion to 50, extension to 55, UD to 24, RD to 25 and supination to 65. Continue - see below  Met flexion, URD, RD   EXT 50 this date []Met  [x]Partially met  []Not met   Long term goal 3: Patient to improve L  to 45#, 2 pt to 5#, 3 pt to 6# and lateral to 7#. Continue     []Met  [x]Partially met  []Not met   Long term goal 4: Patient to demonstrate decreased edema LIF to 7.0, LLF to 6.4, LRF to 6.4, LSF to 5.9, L thumb to 6.5, L MCP's to 19.5, and L wrist to 16.5 cm. Continue   Met LIF, thumb and MCP  []Met  [x]Partially met  []Not met   Time Frame for Short term goals: 4 weeks       Short term goal 1: Patient to demonstrate independence c HEP.  MET  [x]Met  []Partially met  []Not met   Short term goal 2: Patient to improve MARTINEZ L digits >220.  Met     [x]Met  []Partially met  []Not met   Short term goal 3: Patient to improve L thumb MP to 55, IP to 49, PAB to 15.5 cm and RAB to 13.5 Continue- see chart below   MET IP and RAB []Met  [x]Partially met  []Not met   ADDITIONAL COMMENTS            THUMB ROM LEFT   MP 47   IP 60   Robins AB 14.1   Radial AB 13.7       /PINCH STRENGTH  (measured in #) LEFT    30   2 pt pinch 2   3 pt pinch 3   Key/lateral pinch 3.5       EDUCATION  New Education provided to patient/family/caregiver:    []Yes:     [x]No (Continued review of prior education)   If yes Education Provided:     Method of Education:     [x]Discussion     [x]Demonstration    [] Written     []Other  Evaluation of Patients Response to Education:         [x]Patient and or caregiver verbalized understanding  []Patient and or Caregiver Demonstrated without assistance   []Patient and or Caregiver Demonstrated with assistance  []Needs additional instruction to demonstrate understanding of education    ASSESSMENT  Patient tolerated todays treatment session:    [x] Good   []  Fair   []  Poor  Limitations/difficulties with treatment session due to:   []Pain     []Fatigue     []Other medical complications     []Other  Goal Assessment: [] No Change    [x]Improved  Comments:    Minutes Tracking:   IN: 10:17am  OUT: 11:00am  43 min                PLAN  [x]Continue with current plan of care  [][facility]  []IHold per patient request  [] Change Treatment plan:  [] Insurance hold  __ Other        Electronically signed by GEOFFREY Sotelo 4/29/2019 10:22 AM

## 2019-05-01 ENCOUNTER — HOSPITAL ENCOUNTER (OUTPATIENT)
Dept: OCCUPATIONAL THERAPY | Age: 69
Setting detail: THERAPIES SERIES
Discharge: HOME | End: 2019-05-01
Payer: MEDICARE

## 2019-05-01 PROCEDURE — 97140 MANUAL THERAPY 1/> REGIONS: CPT

## 2019-05-01 PROCEDURE — 97110 THERAPEUTIC EXERCISES: CPT

## 2019-05-01 PROCEDURE — 97022 WHIRLPOOL THERAPY: CPT

## 2019-05-01 NOTE — PLAN OF CARE
Phone: Memo    Fax: [phone]                       Outpatient Occupational Therapy                                                                        Updated Plan of Care    Patient Name: Sanna Estrada         : 1950  [de-identified]68 y.o.)  Gender: male   Medical Diagnosis: Fracture of distal end of L radius, S52.592D  Micah Cruz MD  Total # of Visits to Date:  #: [id number]  _____________________________________________________________________    Plan of Care dates of services to include:  2019 to 06/10/19    Treatment Plan:     Frequency: 3 times/week    Duration: 6 weeks    Evaluations      Modalities  [x] Evaluation and Treatment    [x] Cold/Hot Pack    [] Re-Evaluations     [x] Electrical Stimulation   [] Neurobehavioral Status Exam   [x] Ultrasound/ Phono  [] Other      [x] HEP          [x] Paraffin Bath         [x] Whirlpool/Fluido         [] Other:_______________    Procedures  [x] Activities of Daily Living     [] Therapeutic Activites    [] Cognitive Skills Development   [x] Therapeutic Exercises   [x] Manual Therapy Technique(s)    [] Wheelchair Assessment/ Training  [] Neuromuscular Re-education   [] Debridement/ Dressing  [x] Orthotic/Splint Fitting and Training   [] Other: (Specify)______________  [] Checkout for Orthotic/Prosthertic Use                      GOALS:      Time Frame for Long term goals : 6 weeks       Long term goal 1: Patient to state <20% impairment throughout daily tasks, as measured by the DASH. Continue []Met  [x]Partially met  []Not met   Long term goal 2: Patient to improve L wrist flexion to 50, extension to 55, UD to 24, RD to 25 and supination to 65. Continue - see below    Met flexion, UD, RD    []Met  [x]Partially met  []Not met   Long term goal 3: Patient to improve L  to 45#, 2 pt to 5#, 3 pt to 6# and lateral to 7#.  Continue     []Met  [x]Partially met  []Not met   Long term goal 4: Patient to demonstrate decreased edema LIF to 7.0, LLF to 6.4, LRF to 6.4, LSF to 5.9, L thumb to 6.5, L MCP's to 19.5, and L wrist to 16.5 cm. Continue   Met LIF, thumb and MCP  []Met  [x]Partially met  []Not met   Time Frame for Short term goals: 4 weeks       Short term goal 1: Patient to demonstrate independence c HEP. MET  [x]Met  []Partially met  []Not met   Short term goal 2: Patient to improve MARTINEZ L digits >220.  Met   Small finger 229 [x]Met  []Partially met  []Not met   Short term goal 3: Patient to improve L thumb MP to 55, IP to 49, PAB to 15.5 cm and RAB to 13.5 Continue  Met IP and RAB []Met  [x]Partially met  []Not met   ADDITIONAL COMMENTS          WRIST AROM Left   Wrist Flexion 50   Wrist Extension 51   Ulnar Deviation 26   Radial Deviation 26   Supination 60     THUMB ROM LEFT   MP 47   IP 60   Robins AB 14   Radial AB 13.5       /PINCH STRENGTH  (measured in #) LEFT    30   2 pt pinch 2   3 pt pinch 3   Key/lateral pinch 3.5      Rehab Potential  [] Excellent  [x] Good   [] Fair   [] Poor    OT Comments: Patient consistently attends OT treatment sessions. Patient is compliant c HEP. Continue OT services for ROM of wrist and thumb as well as strength. MD Comments:           Regulatory Requirements  I have reviewed this plan of care and certify a need for medically necessary rehabilitation services.     Physician Signature:_____________________________________    Date:_________________________________  Please sign and fax to [phone]

## 2019-05-01 NOTE — PROGRESS NOTES
Phone: Memo    Fax: [phone]                       Outpatient Occupational Therapy                 DAILY TREATMENT NOTE    Date: 5/1/2019  Patients Name:  Ronna Monsalve  YOB: 1950 (68 y.o.)  Gender:  male  MRN:  [id number]  [facility] #: [id number]  Medical Diagnosis: Fracture of distal end of L radius, S52.592D    Referring Practitioner: Shen Sands MD     INSURANCE  OT Insurance Information: Medicare       Total # of Visits to Date: 21       PAIN  [x]No     []Yes      Location:   Pain Rating (0-10 pain scale):   Pain Description:     SUBJECTIVE   No pain reported until he \"moves it to far. \" Patient reported that he is a little sore from previous day, as he was used it too much. Flow Sheet  Exercise /   Manual treatment Weight/  Level Reps/Time Comments    Joint mobilization  x x  L wrist and digits to improve ROM   Joint distractions x x  L wrist and digits to improve ROM   PROM x x  L wrist and digits to improve ROM    Retrograde massage     L digits and wrist to decrease swelling   Scar massage     L FA incision to improve mobilization and scar tissue management.    Putty Orange  X 3 min each L hand pinches and  to increase fxl strength     Maze x      x5-10 To improve wrist ROM       Powerweb Red  x20 MP flexion and extension and pull backs to improve strength    Pro/Sup Wand  2 weights x20 Sup and pronation to improve wrist strength and ROM    Flexbar     Twist and bend to improve strength    Hand [place]     To improve  strength                                                                                                   Modality Flow Sheet:  START STOP Tx Modality       Electrical Stim:          Ultrasound: ___ W/cm2 x ___ mins  Duty factor: __100%  __50%  __20% __10%  Head size:   MHz: __1mHz __2 mHz  __3mHz  Location:       Hot Pack: L hand/wrist for pain and stiffness.  Patient tolerated well with skin intact pre and post or Caregiver Demonstrated with assistance  []Needs additional instruction to demonstrate understanding of education    ASSESSMENT  Patient tolerated todays treatment session:    [x] Good   []  Fair   []  Poor  Limitations/difficulties with treatment session due to:   []Pain     []Fatigue     []Other medical complications     []Other  Goal Assessment: [] No Change    [x]Improved  Comments:    Minutes Tracking:  Time In: 0915  Time Out: 1005  Minutes: 48        PLAN  [x]Continue with current plan of care  [][facility]  []IHold per patient request  [] Change Treatment plan:  [] Insurance hold  __ Other        Electronically signed by Marilee Lennox, OTD, OTD/L 5/1/2019 10:12 AM

## 2019-05-03 ENCOUNTER — HOSPITAL ENCOUNTER (OUTPATIENT)
Dept: OCCUPATIONAL THERAPY | Age: 69
Setting detail: THERAPIES SERIES
Discharge: HOME | End: 2019-05-03
Payer: MEDICARE

## 2019-05-03 PROCEDURE — 97035 APP MDLTY 1+ULTRASOUND EA 15: CPT

## 2019-05-03 PROCEDURE — 97140 MANUAL THERAPY 1/> REGIONS: CPT

## 2019-05-03 PROCEDURE — 97022 WHIRLPOOL THERAPY: CPT

## 2019-05-03 NOTE — PROGRESS NOTES
Phone: Memo    Fax: [phone]                       Outpatient Occupational Therapy                 DAILY TREATMENT NOTE    Date: 5/3/2019  Patients Name:  Tyrese Seay  YOB: 1950 (68 y.o.)  Gender:  male  MRN:  [id number]  [facility] #: [id number]  Medical Diagnosis: Fracture of distal end of L radius, S52.592D    Referring Practitioner: Evette Olguin MD     INSURANCE  OT Insurance Information: Medicare       Total # of Visits to Date: 24       PAIN  []No     [x]Yes      Location: L wrist  Pain Rating (0-10 pain scale): 1/10  Pain Description:     SUBJECTIVE   Patient cont to report soreness. Flow Sheet  Exercise /   Manual treatment Weight/  Level Reps/Time Comments    Joint mobilization  x x  L wrist and digits to improve ROM   Joint distractions x x  L wrist and digits to improve ROM   PROM x x  L wrist and digits to improve ROM    Retrograde massage     L digits and wrist to decrease swelling   Scar massage     L FA incision to improve mobilization and scar tissue management.    Putty   L hand pinches and  to increase fxl strength     Maze   To improve wrist ROM       Powerweb Red  x20 MP flexion and extension and pull backs to improve strength    Pro/Sup Wand  2 weights x20 Sup and pronation to improve wrist strength and ROM    Flexbar     Twist and bend to improve strength    Hand [place]     To improve  strength                                                                                                   Modality Flow Sheet:  START STOP Tx Modality       Electrical Stim:         8 min Ultrasound: __.8_ W/cm2 x __8_ mins  Duty factor: _x_100%  __50%  __20% __10%  Head size:   MHz: __1mHz __2 mHz  _x_3mHz  Location:       Hot Pack: L hand/wrist for pain and stiffness. Patient tolerated well with skin intact pre and post treatment.       Paraffin: L hand/wrist for pain and stiffness.  Patient tolerated well with skin intact pre and post treatment.      15 minutes Fluidotherapy: L hand/wrist for stiffness, pain. Mod agitation, mod heat. Patient tolerated well with skin intact pre and post treatment.      GOALS/ TREATMENT SESSION:      Subjective report:       Time Frame for Long term goals : 6 weeks       Long term goal 1: Patient to state <20% impairment throughout daily tasks, as measured by the DASH. Continue []Met  [x]Partially met  []Not met   Long term goal 2: Patient to improve L wrist flexion to 50, extension to 55, UD to 24, RD to 25 and supination to 65. Continue  Flexion: 46  Extension: 50   UD: 20  RD: 28 - Met []Met  [x]Partially met  []Not met   Long term goal 3: Patient to improve L  to 45#, 2 pt to 5#, 3 pt to 6# and lateral to 7#. Continue     []Met  [x]Partially met  []Not met   Long term goal 4: Patient to demonstrate decreased edema LIF to 7.0, LLF to 6.4, LRF to 6.4, LSF to 5.9, L thumb to 6.5, L MCP's to 19.5, and L wrist to 16.5 cm. Continue      Met LIF, thumb and MCP  []Met  [x]Partially met  []Not met   Time Frame for Short term goals: 4 weeks       Short term goal 1: Patient to demonstrate independence c HEP.  MET  [x]Met  []Partially met  []Not met   Short term goal 2: Patient to improve MARTINEZ L digits >220.  Met     [x]Met  []Partially met  []Not met   Short term goal 3: Patient to improve L thumb MP to 55, IP to 49, PAB to 15.5 cm and RAB to 13.5 Continue   MET IP and RAB []Met  [x]Partially met  []Not met   ADDITIONAL COMMENTS           EDUCATION  New Education provided to patient/family/caregiver:    []Yes:     [x]No (Continued review of prior education)   If yes Education Provided:     Method of Education:     [x]Discussion     []Demonstration    [] Written     []Other  Evaluation of Patients Response to Education:         [x]Patient and or caregiver verbalized understanding  []Patient and or Caregiver Demonstrated without assistance   []Patient and or Caregiver Demonstrated with assistance  []Needs additional

## 2019-05-06 ENCOUNTER — HOSPITAL ENCOUNTER (OUTPATIENT)
Dept: OCCUPATIONAL THERAPY | Age: 69
Setting detail: THERAPIES SERIES
Discharge: HOME | End: 2019-05-06
Payer: MEDICARE

## 2019-05-06 PROCEDURE — 97022 WHIRLPOOL THERAPY: CPT

## 2019-05-06 PROCEDURE — 97035 APP MDLTY 1+ULTRASOUND EA 15: CPT

## 2019-05-06 PROCEDURE — 97140 MANUAL THERAPY 1/> REGIONS: CPT

## 2019-05-06 PROCEDURE — 97110 THERAPEUTIC EXERCISES: CPT

## 2019-05-06 NOTE — PROGRESS NOTES
Phone: Memo    Fax: [phone]                       Outpatient Occupational Therapy                 DAILY TREATMENT NOTE    Date: 5/6/2019  Patients Name:  Mark Salcido  YOB: 1950 (68 y.o.)  Gender:  male  MRN:  [id number]  [facility] #: [id number]  Medical Diagnosis: Fracture of distal end of L radius, S52.592D    Referring Practitioner: Yaquelin Pickering MD     INSURANCE  OT Insurance Information: Medicare       Total # of Visits to Date: 25       PAIN  [x]No     []Yes      Location:   Pain Rating (0-10 pain scale):   Pain Description:     SUBJECTIVE    Patient continues to state soreness. States pain in \"random\" areas of wrist.       Flow Sheet  Exercise /   Manual treatment Weight/  Level Reps/Time Comments    Joint mobilization  x x  L wrist and digits to improve ROM   Joint distractions x x  L wrist and digits to improve ROM   PROM x x  L wrist and digits to improve ROM    Retrograde massage     L digits and wrist to decrease swelling   Scar massage     L FA incision to improve mobilization and scar tissue management.    Putty  orange x8 minutes L hand pinches, finger spreads and  to increase fxl strength     Maze x  x8-10 To improve wrist ROM       Powerweb   MP flexion and extension and pull backs to improve strength    Pro/Sup Wand   Sup and pronation to improve wrist strength and ROM    Flexbar     Twist and bend to improve strength    Hand [place]     To improve  strength                                                                                                   Modality Flow Sheet:  START STOP Tx Modality       Electrical Stim:         8 min Ultrasound: __1.0_ W/cm2 x __8_ mins  Duty factor: __100%  _x_50%  __20% __10%  Head size:   MHz: __1mHz __2 mHz  _x_3mHz  Location:L ulnar side dorsal wrist for pain. Patient tolerated well with skin intact pre and post treatment.       Hot Pack: L hand/wrist for pain and stiffness.  Patient tolerated well with skin intact pre and post treatment.       Paraffin: L hand/wrist for pain and stiffness. Patient tolerated well with skin intact pre and post treatment.      15 minutes Fluidotherapy: L hand/wrist for stiffness, pain. Mod agitation, mod heat. Patient tolerated well with skin intact pre and post treatment.      GOALS/ TREATMENT SESSION:      Subjective report:       Time Frame for Long term goals : 6 weeks       Long term goal 1: Patient to state <20% impairment throughout daily tasks, as measured by the DASH. Continue []Met  [x]Partially met  []Not met   Long term goal 2: Patient to improve L wrist flexion to 50, extension to 55, UD to 24, RD to 25 and supination to 65. Continue    RD:  Met []Met  [x]Partially met  []Not met   Long term goal 3: Patient to improve L  to 45#, 2 pt to 5#, 3 pt to 6# and lateral to 7#. Continue     []Met  [x]Partially met  []Not met   Long term goal 4: Patient to demonstrate decreased edema LIF to 7.0, LLF to 6.4, LRF to 6.4, LSF to 5.9, L thumb to 6.5, L MCP's to 19.5, and L wrist to 16.5 cm. Continue      Met LIF, thumb and MCP  []Met  [x]Partially met  []Not met   Time Frame for Short term goals: 4 weeks       Short term goal 1: Patient to demonstrate independence c HEP.  MET  [x]Met  []Partially met  []Not met   Short term goal 2: Patient to improve MARTINEZ L digits >220.  Met     [x]Met  []Partially met  []Not met   Short term goal 3: Patient to improve L thumb MP to 55, IP to 49, PAB to 15.5 cm and RAB to 13.5 Continue   MET IP and RAB []Met  [x]Partially met  []Not met   ADDITIONAL COMMENTS         EDUCATION  New Education provided to patient/family/caregiver:    []Yes:     [x]No (Continued review of prior education)   If yes Education Provided:     Method of Education:     [x]Discussion     []Demonstration    [] Written     []Other  Evaluation of Patients Response to Education:         [x]Patient and or caregiver verbalized understanding  []Patient and or Caregiver Demonstrated without assistance   []Patient and or Caregiver Demonstrated with assistance  []Needs additional instruction to demonstrate understanding of education    ASSESSMENT  Patient tolerated todays treatment session:    [x] Good   []  Fair   []  Poor  Limitations/difficulties with treatment session due to:   []Pain     []Fatigue     []Other medical complications     []Other  Goal Assessment: [] No Change    [x]Improved  Comments:    Minutes Tracking:  Time In: 0915  Time Out: 1010  Minutes: 54        PLAN  [x]Continue with current plan of care  [][facility]  []IHold per patient request  [] Change Treatment plan:  [] Insurance hold  __ Other        Electronically signed by ALTAGRACIA Hernandez OTR/L 5/6/2019 10:18 AM

## 2019-05-08 ENCOUNTER — HOSPITAL ENCOUNTER (OUTPATIENT)
Dept: OCCUPATIONAL THERAPY | Age: 69
Setting detail: THERAPIES SERIES
Discharge: HOME | End: 2019-05-08
Payer: MEDICARE

## 2019-05-08 PROCEDURE — 97110 THERAPEUTIC EXERCISES: CPT

## 2019-05-08 PROCEDURE — 97140 MANUAL THERAPY 1/> REGIONS: CPT

## 2019-05-08 PROCEDURE — 97022 WHIRLPOOL THERAPY: CPT

## 2019-05-08 NOTE — PROGRESS NOTES
Phone: Memo    Fax: [phone]                       Outpatient Occupational Therapy                 DAILY TREATMENT NOTE    Date: 5/8/2019  Patients Name:  Rosanne Garcia  YOB: 1950 (68 y.o.)  Gender:  male  MRN:  [id number]  [facility] #: [id number]  Medical Diagnosis: Fracture of distal end of L radius, S52.592D    Referring Practitioner: Nehal Larson MD     INSURANCE  OT Insurance Information: Medicare       Total # of Visits to Date: 21       PAIN  []No     [x]Yes      Location: L wrist   Pain Rating (0-10 pain scale): 2/10  Pain Description:     SUBJECTIVE   Patient reports that he is no longer dropping pills or change when put in L hand (improved supination). Flow Sheet  Exercise /   Manual treatment Weight/  Level Reps/Time Comments    Joint mobilization  x x  L wrist and digits to improve ROM   Joint distractions x x  L wrist and digits to improve ROM   PROM x x  L wrist and digits to improve ROM    Retrograde massage     L digits and wrist to decrease swelling   Scar massage     L FA incision to improve mobilization and scar tissue management.    Putty  orange x8 minutes L hand pinches, finger spreads and  to increase fxl strength     Maze   To improve wrist ROM       Powerweb     MP flexion and extension and pull backs to improve strength    Pro/Sup Wand x2 weights x3-4 minutes Sup and pronation to improve wrist strength and ROM    Flexbar     Twist and bend to improve strength    Hand [place]     To improve  strength                                                                                                   Modality Flow Sheet:  START STOP Tx Modality       Electrical Stim:         8 min Ultrasound: __1.0_ W/cm2 x __8_ mins  Duty factor: _x_100%  __50%  __20% __10%  Head size:   MHz: __1mHz __2 mHz  _x_3mHz  Location:L ulnar side dorsal wrist for pain.  Patient tolerated well with skin intact pre and post treatment.       Hot Pack: L hand/wrist for pain and stiffness. Patient tolerated well with skin intact pre and post treatment.       Paraffin: L hand/wrist for pain and stiffness. Patient tolerated well with skin intact pre and post treatment.      15 minutes Fluidotherapy: L hand/wrist for stiffness, pain. Mod agitation, mod heat. Patient tolerated well with skin intact pre and post treatment.      GOALS/ TREATMENT SESSION:      Subjective report:       Time Frame for Long term goals : 6 weeks       Long term goal 1: Patient to state <20% impairment throughout daily tasks, as measured by the DASH. Continue []Met  [x]Partially met  []Not met   Long term goal 2: Patient to improve L wrist flexion to 50, extension to 55, UD to 24, RD to 25 and supination to 65. Continue     RD:  Met []Met  [x]Partially met  []Not met   Long term goal 3: Patient to improve L  to 45#, 2 pt to 5#, 3 pt to 6# and lateral to 7#. Continue     []Met  [x]Partially met  []Not met   Long term goal 4: Patient to demonstrate decreased edema LIF to 7.0, LLF to 6.4, LRF to 6.4, LSF to 5.9, L thumb to 6.5, L MCP's to 19.5, and L wrist to 16.5 cm. Continue      Met LIF, thumb and MCP  []Met  [x]Partially met  []Not met   Time Frame for Short term goals: 4 weeks       Short term goal 1: Patient to demonstrate independence c HEP.  MET  [x]Met  []Partially met  []Not met   Short term goal 2: Patient to improve MARTINEZ L digits >220.  Met     [x]Met  []Partially met  []Not met   Short term goal 3: Patient to improve L thumb MP to 55, IP to 49, PAB to 15.5 cm and RAB to 13.5 Met this date  PAB: 15.5 cm  Previously met IP and RAB []Met  [x]Partially met  []Not met   ADDITIONAL COMMENTS             EDUCATION  New Education provided to patient/family/caregiver:    []Yes:     [x]No (Continued review of prior education)   If yes Education Provided:     Method of Education:     [x]Discussion     []Demonstration    [] Written     []Other  Evaluation of Patients Response to Education:         [x]Patient and or caregiver verbalized understanding  []Patient and or Caregiver Demonstrated without assistance   []Patient and or Caregiver Demonstrated with assistance  []Needs additional instruction to demonstrate understanding of education    ASSESSMENT  Patient tolerated todays treatment session:    [x] Good   []  Fair   []  Poor  Limitations/difficulties with treatment session due to:   []Pain     []Fatigue     []Other medical complications     []Other  Goal Assessment: [] No Change    [x]Improved  Comments:    Minutes Tracking:  Time In: 5136  Time Out: 1011  Minutes: 48        PLAN  [x]Continue with current plan of care  [][facility]  []IHold per patient request  [] Change Treatment plan:  [] Insurance hold  __ Other        Electronically signed by ALTAGRACIA Ferris OTR/L 5/8/2019 10:24 AM

## 2019-05-10 ENCOUNTER — HOSPITAL ENCOUNTER (OUTPATIENT)
Dept: OCCUPATIONAL THERAPY | Age: 69
Setting detail: THERAPIES SERIES
Discharge: HOME | End: 2019-05-10
Payer: MEDICARE

## 2019-05-10 PROBLEM — R19.5 POSITIVE FIT (FECAL IMMUNOCHEMICAL TEST): Status: ACTIVE | Noted: 2019-05-10

## 2019-05-10 PROCEDURE — 97035 APP MDLTY 1+ULTRASOUND EA 15: CPT

## 2019-05-10 PROCEDURE — 97110 THERAPEUTIC EXERCISES: CPT

## 2019-05-10 PROCEDURE — 97140 MANUAL THERAPY 1/> REGIONS: CPT

## 2019-05-10 PROCEDURE — 97022 WHIRLPOOL THERAPY: CPT

## 2019-05-10 NOTE — PROGRESS NOTES
Phone: Memo    Fax: [phone]                       Outpatient Occupational Therapy                 DAILY TREATMENT NOTE    Date: 5/10/2019  Patients Name:  Talha Jenkins  YOB: 1950 (68 y.o.)  Gender:  male  MRN:  [id number]  [facility] #: [id number]  Medical Diagnosis: Fracture of distal end of L radius, S52.592D    Referring Practitioner: Baron Dl MD     INSURANCE  OT Insurance Information: Medicare       Total # of Visits to Date: 25       PAIN  []No     [x]Yes      Location: L wrist  Pain Rating (0-10 pain scale): 3/10  Pain Description: soreness    SUBJECTIVE   Pt reports soreness from using \"vibrating tool\" to fabricate metal implement. F/U with physician Wed post OT tx, x rays taken pt reports Dr stated Binu Cameron looks good\". Flow Sheet  Exercise /   Manual treatment Weight/  Level Reps/Time Comments    Joint mobilization  x x  L wrist and digits to improve ROM   Joint distractions x x  L wrist and digits to improve ROM   PROM x x  L wrist and digits to improve ROM    Retrograde massage     L digits and wrist to decrease swelling   Scar massage     L FA incision to improve mobilization and scar tissue management.    Putty  orange x8 minutes L hand pinches, finger spreads and  to increase fxl strength     Maze     To improve wrist ROM       Powerweb     MP flexion and extension and pull backs to improve strength    Pro/Sup Wand x2 weights x3-4 minutes Sup and pronation to improve wrist strength and ROM. Pt reports 3/10 pain in dorsal ulnar area with act.  Pt edu to perform as HEP with G understanding.    Flexbar     Twist and bend to improve strength    Hand [place]     To improve  strength                                                                                                   Modality Flow Sheet:  START STOP Tx Modality       Electrical Stim:         8 min Ultrasound: __1.0_ W/cm2 x __8_ mins  Duty factor: _x_100%  __50%  __20% __10%  Head size:   MHz: __1mHz __2 mHz  _x_3mHz  Location:L ulnar side dorsal wrist for pain. Patient tolerated well with skin intact pre and post treatment.       Hot Pack: L hand/wrist for pain and stiffness. Patient tolerated well with skin intact pre and post treatment.       Paraffin: L hand/wrist for pain and stiffness. Patient tolerated well with skin intact pre and post treatment.      15 minutes Fluidotherapy: L hand/wrist for stiffness, pain. Mod agitation, mod heat. Patient tolerated well with skin intact pre and post treatment.      GOALS/ TREATMENT SESSION:      Subjective report:       Time Frame for Long term goals : 6 weeks       Long term goal 1: Patient to state <20% impairment throughout daily tasks, as measured by the DASH. Continue []Met  [x]Partially met  []Not met   Long term goal 2: Patient to improve L wrist flexion to 50, extension to 55, UD to 24, RD to 25 and supination to 65. Continue   FLEX MET 51  EXT 51  RD:  Met []Met  [x]Partially met  []Not met   Long term goal 3: Patient to improve L  to 45#, 2 pt to 5#, 3 pt to 6# and lateral to 7#. Continue    35#    []Met  [x]Partially met  []Not met   Long term goal 4: Patient to demonstrate decreased edema LIF to 7.0, LLF to 6.4, LRF to 6.4, LSF to 5.9, L thumb to 6.5, L MCP's to 19.5, and L wrist to 16.5 cm. Continue      Met LIF, thumb and MCP  []Met  [x]Partially met  []Not met   Time Frame for Short term goals: 4 weeks       Short term goal 1: Patient to demonstrate independence c HEP.  MET  [x]Met  []Partially met  []Not met   Short term goal 2: Patient to improve MARTINEZ L digits >220.  Met     [x]Met  []Partially met  []Not met   Short term goal 3: Patient to improve L thumb MP to 55, IP to 49, PAB to 15.5 cm and RAB to 13.5 Met this date  PAB: 15.5 cm  Previously met IP and RAB []Met  [x]Partially met  []Not met   ADDITIONAL COMMENTS                      EDUCATION  New Education provided to patient/family/caregiver: [x]Yes:     []No (Continued review of prior education)   If yes Education Provided:     Method of Education:     [x]Discussion     [x]Demonstration    [] Written     []Other  Evaluation of Patients Response to Education:         [x]Patient and or caregiver verbalized understanding  []Patient and or Caregiver Demonstrated without assistance   []Patient and or Caregiver Demonstrated with assistance  []Needs additional instruction to demonstrate understanding of education    ASSESSMENT  Patient tolerated todays treatment session:    [x] Good   []  Fair   []  Poor  Limitations/difficulties with treatment session due to:   []Pain     []Fatigue     []Other medical complications     []Other  Goal Assessment: [] No Change    [x]Improved  Comments:    Minutes Tracking:   in: 8:45am    out: 9:41 am  56 min           PLAN  [x]Continue with current plan of care  [][facility]  []IHold per patient request  [] Change Treatment plan:  [] Insurance hold  __ Other        Electronically signed by GEOFFREY Roberts 5/10/2019 8:49 AM

## 2019-05-13 ENCOUNTER — HOSPITAL ENCOUNTER (OUTPATIENT)
Age: 69
Setting detail: OUTPATIENT SURGERY
Discharge: HOME | End: 2019-05-13
Attending: INTERNAL MEDICINE | Admitting: INTERNAL MEDICINE
Payer: MEDICARE

## 2019-05-13 ENCOUNTER — ANESTHESIA EVENT (OUTPATIENT)
Dept: OPERATING ROOM | Age: 69
End: 2019-05-13
Payer: MEDICARE

## 2019-05-13 ENCOUNTER — ANESTHESIA (OUTPATIENT)
Dept: OPERATING ROOM | Age: 69
End: 2019-05-13
Payer: MEDICARE

## 2019-05-13 VITALS
SYSTOLIC BLOOD PRESSURE: 125 MMHG | BODY MASS INDEX: 33.32 KG/M2 | DIASTOLIC BLOOD PRESSURE: 88 MMHG | RESPIRATION RATE: 16 BRPM | TEMPERATURE: 97 F | HEART RATE: 88 BPM | OXYGEN SATURATION: 96 % | HEIGHT: 65 IN | WEIGHT: 200 LBS

## 2019-05-13 VITALS
RESPIRATION RATE: 17 BRPM | DIASTOLIC BLOOD PRESSURE: 74 MMHG | OXYGEN SATURATION: 94 % | SYSTOLIC BLOOD PRESSURE: 106 MMHG

## 2019-05-13 PROCEDURE — 7100000010 HC PHASE II RECOVERY - FIRST 15 MIN: Performed by: INTERNAL MEDICINE

## 2019-05-13 PROCEDURE — 6360000002 HC RX W HCPCS: Performed by: NURSE ANESTHETIST, CERTIFIED REGISTERED

## 2019-05-13 PROCEDURE — 3700000000 HC ANESTHESIA ATTENDED CARE: Performed by: INTERNAL MEDICINE

## 2019-05-13 PROCEDURE — 3700000001 HC ADD 15 MINUTES (ANESTHESIA): Performed by: INTERNAL MEDICINE

## 2019-05-13 PROCEDURE — 88305 TISSUE EXAM BY PATHOLOGIST: CPT

## 2019-05-13 PROCEDURE — 2500000003 HC RX 250 WO HCPCS: Performed by: NURSE ANESTHETIST, CERTIFIED REGISTERED

## 2019-05-13 PROCEDURE — 7100000011 HC PHASE II RECOVERY - ADDTL 15 MIN: Performed by: INTERNAL MEDICINE

## 2019-05-13 PROCEDURE — 2709999900 HC NON-CHARGEABLE SUPPLY: Performed by: INTERNAL MEDICINE

## 2019-05-13 PROCEDURE — 45385 COLONOSCOPY W/LESION REMOVAL: CPT | Performed by: INTERNAL MEDICINE

## 2019-05-13 PROCEDURE — 2580000003 HC RX 258: Performed by: INTERNAL MEDICINE

## 2019-05-13 PROCEDURE — 3609010600 HC COLONOSCOPY POLYPECTOMY SNARE/COLD BIOPSY: Performed by: INTERNAL MEDICINE

## 2019-05-13 RX ORDER — PROPOFOL 10 MG/ML
INJECTION, EMULSION INTRAVENOUS CONTINUOUS PRN
Status: DISCONTINUED | OUTPATIENT
Start: 2019-05-13 | End: 2019-05-13 | Stop reason: SDUPTHER

## 2019-05-13 RX ORDER — PROPOFOL 10 MG/ML
INJECTION, EMULSION INTRAVENOUS PRN
Status: DISCONTINUED | OUTPATIENT
Start: 2019-05-13 | End: 2019-05-13 | Stop reason: SDUPTHER

## 2019-05-13 RX ORDER — SODIUM CHLORIDE, SODIUM LACTATE, POTASSIUM CHLORIDE, CALCIUM CHLORIDE 600; 310; 30; 20 MG/100ML; MG/100ML; MG/100ML; MG/100ML
INJECTION, SOLUTION INTRAVENOUS CONTINUOUS
Status: DISCONTINUED | OUTPATIENT
Start: 2019-05-13 | End: 2019-05-13 | Stop reason: HOSPADM

## 2019-05-13 RX ORDER — LIDOCAINE HYDROCHLORIDE 20 MG/ML
INJECTION, SOLUTION EPIDURAL; INFILTRATION; INTRACAUDAL; PERINEURAL PRN
Status: DISCONTINUED | OUTPATIENT
Start: 2019-05-13 | End: 2019-05-13 | Stop reason: SDUPTHER

## 2019-05-13 RX ADMIN — PROPOFOL 80 MG: 10 INJECTION, EMULSION INTRAVENOUS at 11:13

## 2019-05-13 RX ADMIN — LIDOCAINE HYDROCHLORIDE 100 MG: 20 INJECTION, SOLUTION EPIDURAL; INFILTRATION; INTRACAUDAL at 11:13

## 2019-05-13 RX ADMIN — SODIUM CHLORIDE, POTASSIUM CHLORIDE, SODIUM LACTATE AND CALCIUM CHLORIDE: 600; 310; 30; 20 INJECTION, SOLUTION INTRAVENOUS at 09:28

## 2019-05-13 RX ADMIN — PROPOFOL 200 MCG/KG/MIN: 10 INJECTION, EMULSION INTRAVENOUS at 11:13

## 2019-05-13 ASSESSMENT — PAIN SCALES - GENERAL
PAINLEVEL_OUTOF10: 0

## 2019-05-13 ASSESSMENT — PAIN - FUNCTIONAL ASSESSMENT: PAIN_FUNCTIONAL_ASSESSMENT: 0-10

## 2019-05-13 NOTE — PROGRESS NOTES
Dr. David Schulz in to see the patient. DC Instructions given to patient and family. Both verbalized understanding.

## 2019-05-13 NOTE — ANESTHESIA PRE PROCEDURE
Department of Anesthesiology  Preprocedure Note       Name:  Sylvester Doll   Age:  68 y.o.  :  1950                                          MRN:  [id number]         Date:  2019      Surgeon: Kassandra Newell):  Shelbi Miller MD    Procedure: COLONOSCOPY DIAGNOSTIC (N/A )    Medications prior to admission:   Prior to Admission medications    Medication Sig Start Date End Date Taking? Authorizing Provider   Na Sulfate-K Sulfate-Mg Sulf (SUPREP BOWEL PREP KIT) 17.5-3.13-1.6 GM/177ML SOLN Take as directed 3/21/19  Yes Shelib Miller MD   Dulaglutide (TRULICITY) 1.5 LM/5.1WY SOPN Inject 1.5 mg into the skin once a week 3/1/19  Yes Mony Miramontes MD   metFORMIN (GLUCOPHAGE-XR) 500 MG extended release tablet TAKE ONE TABLET BY MOUTH DAILY WITH BREAKFAST 18  Yes Mony Miramontes MD   CINNAMON PO Take 1 tablet by mouth 3 times daily    Yes Historical Provider, MD   LUTEIN-ZEAXANTHIN PO Take 1 tablet by mouth daily   Yes Historical Provider, MD   therapeutic multivitamin-minerals (THERAGRAN-M) tablet Take 1 tablet by mouth daily. Yes Historical Provider, MD   vitamin D (CHOLECALCIFEROL) 1000 UNIT TABS tablet Take 1,000 Units by mouth daily.      Yes Historical Provider, MD   lisinopril (PRINIVIL;ZESTRIL) 2.5 MG tablet TAKE ONE TABLET BY MOUTH DAILY 18   Mony Miramontes MD       Current medications:    Current Facility-Administered Medications   Medication Dose Route Frequency Provider Last Rate Last Dose    lactated ringers infusion   Intravenous Continuous Shelbi Miller  mL/hr at 19 9580         Allergies:  No Known Allergies    Problem List:    Patient Active Problem List   Diagnosis Code    Hematuria     Esophageal reflux K21.9    Essential hypertension I10    Syncope and collapse R55    Hyperlipidemia E78.5    Obesity E66.9    Lung nodule R91.1    Proteinuria R80.9    MTP instability M25.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    Acute gout involving toe of left foot M10.9    Uncontrolled diabetes mellitus type 2 without complications ([facility] F66.86    Type 2 diabetes mellitus without complication ([facility] P98.0    Need for prophylactic vaccination against Streptococcus pneumoniae (pneumococcus) Z23    Orthostasis I95.1    Vasodepressor syncope R55    Positive FIT (fecal immunochemical test) R19.5       Past Medical History:        Diagnosis Date    Benign hematuria     Diabetes mellitus ([facility] 2013    A1C 7.2    Esophageal reflux 2001    Gastritis     GERD (gastroesophageal reflux disease)     Hematuria     Hyperglycemia 1999    Hyperlipidemia     Hypertension     Obesity, unspecified 2015    Other abnormal glucose 2015    Other and unspecified hyperlipidemia 1992    Renal cyst     Syncope and collapse 2015    Type II or unspecified type diabetes mellitus without mention of complication, not stated as uncontrolled     Unspecified essential hypertension 10/1991    borderline    Vasodepressor syncope 2006       Past Surgical History:        Procedure Laterality Date    APPENDECTOMY  1974    CYSTOSCOPY      (-)    CYSTOSCOPY  2012    retrograde Dr. Arvind Ball: (-)    CYSTOURETHROSCOPY  4/3/00,89, 3/16/2012    TONSILLECTOMY AND ADENOIDECTOMY      WRIST FRACTURE SURGERY Left     ORIF       Social History:    Social History     Tobacco Use    Smoking status: Former Smoker     Packs/day: 0.75     Years: 30.00     Pack years: 22.50     Types: Cigars, Cigarettes, Pipe     Last attempt to quit: 2012     Years since quittin.2    Smokeless tobacco: Never Used   Substance Use Topics    Alcohol use: No     Alcohol/week: 0.0 oz                                Counseling given: Not Answered      Vital Signs (Current):   Vitals:    19 0922   BP: (!) 139/100   Pulse: 87   Resp: 16   Temp: 36.4 °C (97.6 °F)   TempSrc: Temporal   SpO2: 96%   Weight: 200 lb (90.7 kg)   Height: 5' 5\" (1.651 m)                                              BP Readings from Last 3 Encounters:   05/13/19 (!) 139/100   02/27/19 128/82   01/26/19 131/89       NPO Status: Time of last liquid consumption: 0445                        Time of last solid consumption: 0900                        Date of last liquid consumption: 05/13/19                        Date of last solid food consumption: 05/12/19    BMI:   Wt Readings from Last 3 Encounters:   05/13/19 200 lb (90.7 kg)   02/27/19 201 lb 3.2 oz (91.3 kg)   09/17/18 201 lb (91.2 kg)     Body mass index is 33.28 kg/m². CBC:   Lab Results   Component Value Date    WBC 7.6 02/19/2019    WBC 8.9 01/30/2019    RBC 4.82 02/19/2019    HGB 14.4 02/19/2019    HCT 41.5 02/19/2019    MCV 86.1 02/19/2019    RDW 12.8 02/19/2019     02/19/2019     01/30/2019       CMP:   Lab Results   Component Value Date     02/19/2019    K 4.2 02/19/2019     02/19/2019    CO2 22 02/19/2019    BUN 17 02/19/2019    CREATININE 1.3 02/19/2019    GFRAA >60 01/30/2019    LABGLOM 52 01/30/2019    GLUCOSE 145 02/19/2019    PROT 6.2 08/17/2017    CALCIUM 9.1 02/19/2019    BILITOT 0.8 08/17/2017    ALKPHOS 90 08/17/2017    ALKPHOS 83 09/27/2015    AST 22 02/19/2019    ALT 34 02/19/2019       POC Tests: No results for input(s): POCGLU, POCNA, POCK, POCCL, POCBUN, POCHEMO, POCHCT in the last 72 hours.     Coags: No results found for: PROTIME, INR, APTT    HCG (If Applicable): No results found for: PREGTESTUR, PREGSERUM, HCG, HCGQUANT     ABGs: No results found for: PHART, PO2ART, CTK4NPB, KYR6PUK, BEART, D3JYFNAQ     Type & Screen (If Applicable):  No results found for: LABABO, LABRH    Anesthesia Evaluation   no history of anesthetic complications:   Airway: Mallampati: II  TM distance: >3 FB   Neck ROM: full  Mouth opening: > = 3 FB Dental:          Pulmonary:Negative Pulmonary ROS and normal exam  breath sounds clear to auscultation                             Cardiovascular:  Exercise tolerance: good (>4 METS),   (+) hypertension (BP rechecked 132/93):, hyperlipidemia      ECG reviewed                        Neuro/Psych:   Negative Neuro/Psych ROS              GI/Hepatic/Renal:   (+) GERD: well controlled, bowel prep, morbid obesity          Endo/Other:    (+) DiabetesType II DM, well controlled, using insulin, . Abdominal:   (+) obese,         Vascular: negative vascular ROS. Anesthesia Plan      general and TIVA     ASA 2       Induction: intravenous. Anesthetic plan and risks discussed with patient.                       AARON Madison - CRNA   5/13/2019

## 2019-05-13 NOTE — OP NOTE
PROCEDURE NOTE    DATE OF PROCEDURE: 5/13/2019    ENDOSCOPIST: Vidal English MD, Martha Lopez    ASSISTANT: None    PREOPERATIVE DIAGNOSIS: Positive FIT    POSTOPERATIVE DIAGNOSIS: hemorrhoids internal, Small in size  polyp(s) #1, 2-4 mm in size, located in the cecum and the transverse colon removed by cold snare and retrieved for pathology    OPERATION: Colonoscopy with polypectomy (cold snare)    ANESTHESIA: MAC     ESTIMATED BLOOD LOSS: Minimal    COMPLICATIONS: None. SPECIMENS: were obtained    HISTORY: The patient is a 68y.o. year old male with history of above preop diagnosis. Colonoscopy with possible biopsy or polypectomy has been recommended and I explained the risk, benefits, expected outcome, and alternatives to the procedure. Risks include but are not limited to bleeding, infection, respiratory distress, hypotension, and perforation of the colon. The patient understands and is in agreement. PROCEDURE: The patient was given monitored anesthesia care. The patient was given oxygen by nasal cannula. The colonoscope was inserted per rectum and advanced under direct vision to the cecum without difficulty. Findings:  Cecum/Ascending colon: abnormal: Polyp removed with cold snare and recovered for histology    Transverse colon: Polyp removed with cold snare and recovered for histology    Descending/Sigmoid colon: normal    Rectum/Anus: examined in normal and retroflexed positions and was abnormal: hemorrhoids    The colon was decompressed and the scope was removed. The patient tolerated the procedure well.        Electronically signed by Junior Cathy MD  on 5/13/2019 at 11:45 AM

## 2019-05-13 NOTE — ANESTHESIA POSTPROCEDURE EVALUATION
Department of Anesthesiology  Postprocedure Note    Patient: Haylie Metz  MRN: [id number]  YOB: 1950  Date of evaluation: 5/13/2019  Time:  11:48 AM     Procedure Summary     Date:  05/13/19 Room / Location:  [facility] AT [place] [address] / [facility] AT [facility]    Anesthesia Start:  1110 Anesthesia Stop:  5623    Procedure:  COLONOSCOPY POLYPECTOMY SNARE/COLD BIOPSY (N/A ) Diagnosis:  (POSITIVE FIT TEST)    Surgeon:  Sagrario Packer MD Responsible Provider:  AARON Sauceda CRNA    Anesthesia Type:  general, TIVA ASA Status:  2          Anesthesia Type: general, TIVA    Apolinar Phase I:      Apolinar Phase II:      Last vitals: Reviewed and per EMR flowsheets.        Anesthesia Post Evaluation    Patient location during evaluation: PACU  Patient participation: complete - patient participated  Level of consciousness: awake and alert  Airway patency: patent  Nausea & Vomiting: no vomiting and no nausea  Complications: no  Cardiovascular status: hemodynamically stable  Respiratory status: spontaneous ventilation and room air  Hydration status: stable

## 2019-05-13 NOTE — H&P
Packs/day: 0.75     Years: 30.00     Pack years: 22.50     Types: Cigars, Cigarettes, Pipe     Last attempt to quit: 2012     Years since quittin.2    Smokeless tobacco: Never Used   Substance and Sexual Activity    Alcohol use: No     Alcohol/week: 0.0 oz    Drug use: No    Sexual activity: Not on file   Lifestyle    Physical activity:     Days per week: Not on file     Minutes per session: Not on file    Stress: Not on file   Relationships    Social connections:     Talks on phone: Not on file     Gets together: Not on file     Attends Rastafarian service: Not on file     Active member of club or organization: Not on file     Attends meetings of clubs or organizations: Not on file     Relationship status: Not on file    Intimate partner violence:     Fear of current or ex partner: Not on file     Emotionally abused: Not on file     Physically abused: Not on file     Forced sexual activity: Not on file   Other Topics Concern    Not on file   Social History Narrative    Not on file     ROS: Non-contributory    Physical Exam:  Vitals:    19 0922   BP: (!) 139/100   Pulse: 87   Resp: 16   Temp: 97.6 °F (36.4 °C)   SpO2: 96%       Chest: Breath sounds were clear and equal with no rales, wheezes, or rhonchi. Respiratory effort was normal with no retractions or use of accessory muscles. Cardiovascular: Heart sounds were normal with a regular rate and rhythm. There were no murmurs, gallops or rubs. Abdomen: Bowel sounds were normal.  The abdomen was soft and non distended. There was no tenderness, guarding, rebound, or rigidity. There were no masses, hepatosplenomegaly, or hernias.     Plan: Colonoscopy      Electronically by Claudeen Havens, MD  on 2019 at 10:57 AM

## 2019-05-15 ENCOUNTER — APPOINTMENT (OUTPATIENT)
Dept: OCCUPATIONAL THERAPY | Age: 69
End: 2019-05-15
Payer: MEDICARE

## 2019-05-15 ENCOUNTER — HOSPITAL ENCOUNTER (OUTPATIENT)
Dept: OCCUPATIONAL THERAPY | Age: 69
Setting detail: THERAPIES SERIES
Discharge: HOME | End: 2019-05-15
Payer: MEDICARE

## 2019-05-15 LAB — SURGICAL PATHOLOGY REPORT: NORMAL

## 2019-05-15 PROCEDURE — 97140 MANUAL THERAPY 1/> REGIONS: CPT

## 2019-05-15 PROCEDURE — 97110 THERAPEUTIC EXERCISES: CPT

## 2019-05-15 PROCEDURE — 97022 WHIRLPOOL THERAPY: CPT

## 2019-05-15 NOTE — PROGRESS NOTES
tolerated well with skin intact pre and post treatment.       Hot Pack: L hand/wrist for pain and stiffness. Patient tolerated well with skin intact pre and post treatment.       Paraffin: L hand/wrist for pain and stiffness. Patient tolerated well with skin intact pre and post treatment.      15 minutes Fluidotherapy: L hand/wrist for stiffness, pain. Mod agitation, mod heat. Patient tolerated well with skin intact pre and post treatment.      GOALS/ TREATMENT SESSION:      Subjective report:       Time Frame for Long term goals : 6 weeks       Long term goal 1: Patient to state <20% impairment throughout daily tasks, as measured by the DASH. Continue []Met  [x]Partially met  []Not met   Long term goal 2: Patient to improve L wrist flexion to 50, extension to 55, UD to 24, RD to 25 and supination to 65. Continue  FLEX MET   RD:  Met []Met  [x]Partially met  []Not met   Long term goal 3: Patient to improve L  to 45#, 2 pt to 5#, 3 pt to 6# and lateral to 7#. Continue    35#    []Met  [x]Partially met  []Not met   Long term goal 4: Patient to demonstrate decreased edema LIF to 7.0, LLF to 6.4, LRF to 6.4, LSF to 5.9, L thumb to 6.5, L MCP's to 19.5, and L wrist to 16.5 cm. Continue      Met LIF, thumb and MCP  []Met  [x]Partially met  []Not met   Time Frame for Short term goals: 4 weeks       Short term goal 1: Patient to demonstrate independence c HEP. MET  [x]Met  []Partially met  []Not met   Short term goal 2: Patient to improve MARTINEZ L digits >220.  Met     [x]Met  []Partially met  []Not met   Short term goal 3: Patient to improve L thumb MP to 55, IP to 49, PAB to 15.5 cm and RAB to 13.5 Met  []Met  [x]Partially met  []Not met   ADDITIONAL COMMENTS           EDUCATION  New Education provided to patient/family/caregiver:    []Yes:     [x]No (Continued review of prior education)   If yes Education Provided:     Method of Education:     [x]Discussion     []Demonstration    [] Written     []Other  Evaluation of Patients Response to Education:         [x]Patient and or caregiver verbalized understanding  []Patient and or Caregiver Demonstrated without assistance   []Patient and or Caregiver Demonstrated with assistance  []Needs additional instruction to demonstrate understanding of education    ASSESSMENT  Patient tolerated todays treatment session:    [x] Good   []  Fair   []  Poor  Limitations/difficulties with treatment session due to:   []Pain     []Fatigue     []Other medical complications     []Other  Goal Assessment: [] No Change    [x]Improved  Comments:    Minutes Tracking:  Time In: 0945  Time Out: 1030  Minutes: 39        PLAN  [x]Continue with current plan of care  [][facility]  []IHold per patient request  [] Change Treatment plan:  [] Insurance hold  __ Other        Electronically signed by ALTAGRACIA Thomas OTR/L 5/15/2019 10:40 AM

## 2019-05-17 ENCOUNTER — HOSPITAL ENCOUNTER (OUTPATIENT)
Dept: OCCUPATIONAL THERAPY | Age: 69
Setting detail: THERAPIES SERIES
Discharge: HOME | End: 2019-05-17
Payer: MEDICARE

## 2019-05-17 ENCOUNTER — APPOINTMENT (OUTPATIENT)
Dept: OCCUPATIONAL THERAPY | Age: 69
End: 2019-05-17
Payer: MEDICARE

## 2019-05-17 PROCEDURE — 97110 THERAPEUTIC EXERCISES: CPT

## 2019-05-17 PROCEDURE — 97140 MANUAL THERAPY 1/> REGIONS: CPT

## 2019-05-17 PROCEDURE — 97022 WHIRLPOOL THERAPY: CPT

## 2019-05-17 NOTE — PROGRESS NOTES
[]Other  Evaluation of Patients Response to Education:         [x]Patient and or caregiver verbalized understanding  []Patient and or Caregiver Demonstrated without assistance   []Patient and or Caregiver Demonstrated with assistance  []Needs additional instruction to demonstrate understanding of education    ASSESSMENT  Patient tolerated todays treatment session:    [x] Good   []  Fair   []  Poor  Limitations/difficulties with treatment session due to:   []Pain     []Fatigue     []Other medical complications     []Other  Goal Assessment: [] No Change    [x]Improved  Comments: improved wrist ROM    Minutes Tracking:  Time In: 0801  Time Out: 0156  Minutes: 39        PLAN  [x]Continue with current plan of care  [][facility]  []IHold per patient request  [] Change Treatment plan:  [] Insurance hold  __ Other        Electronically signed by ALTAGRACIA Anderson, OTR/L 5/17/2019 8:46 AM

## 2019-05-20 ENCOUNTER — HOSPITAL ENCOUNTER (OUTPATIENT)
Dept: OCCUPATIONAL THERAPY | Age: 69
Setting detail: THERAPIES SERIES
Discharge: HOME | End: 2019-05-20
Payer: MEDICARE

## 2019-05-20 ENCOUNTER — APPOINTMENT (OUTPATIENT)
Dept: OCCUPATIONAL THERAPY | Age: 69
End: 2019-05-20
Payer: MEDICARE

## 2019-05-20 PROCEDURE — 97110 THERAPEUTIC EXERCISES: CPT

## 2019-05-20 PROCEDURE — 97022 WHIRLPOOL THERAPY: CPT

## 2019-05-20 PROCEDURE — 97140 MANUAL THERAPY 1/> REGIONS: CPT

## 2019-05-20 NOTE — PROGRESS NOTES
Phone: Memo    Fax: [phone]                       Outpatient Occupational Therapy                 DAILY TREATMENT NOTE    Date: 5/20/2019  Patients Name:  Lebron Meier  YOB: 1950 (68 y.o.)  Gender:  male  MRN:  [id number]  [facility] #: [id number]  Medical Diagnosis: Fracture of distal end of L radius, S52.592D    Referring Practitioner: Reg Coats MD     INSURANCE  OT Insurance Information: Medicare       Total # of Visits to Date: 32       PAIN  [x]No     []Yes      Location:   Pain Rating (0-10 pain scale):   Pain Description:     SUBJECTIVE    \"I found out I can put a little more pressure on my wrist without pain. \"      Flow Sheet  Exercise /   Manual treatment Weight/  Level Reps/Time Comments    Joint mobilization  x x  L wrist and digits to improve ROM   Joint distractions x x  L wrist and digits to improve ROM   PROM x x  L wrist and digits to improve ROM    Retrograde massage     L digits and wrist to decrease swelling   Scar massage     L FA incision to improve mobilization and scar tissue management.    Putty  orange x8 minutes L hand pinches, finger spreads and  to increase fxl strength     Maze  x10 To improve wrist ROM       Powerweb Red  x20 MP flexion and extension and pull backs to improve strength    Pro/Sup Wand     Sup and pronation to improve wrist strength and ROM. Pt reports 3/10 pain in dorsal ulnar area with act. Pt edu to perform as HEP with G understanding.    Flexbar   Twist and bend to improve strength    Hand [place]     To improve  strength                                                                                                   Modality Flow Sheet:  START STOP Tx Modality       Electrical Stim:          Ultrasound: __1.0_ W/cm2 x __8_ mins  Duty factor: _x_100%  __50%  __20% __10%  Head size:   MHz: __1mHz __2 mHz  _x_3mHz  Location:L ulnar side dorsal wrist for pain.  Patient tolerated well with skin

## 2019-05-24 ENCOUNTER — HOSPITAL ENCOUNTER (OUTPATIENT)
Dept: OCCUPATIONAL THERAPY | Age: 69
Setting detail: THERAPIES SERIES
Discharge: HOME | End: 2019-05-24
Payer: MEDICARE

## 2019-05-24 ENCOUNTER — APPOINTMENT (OUTPATIENT)
Dept: OCCUPATIONAL THERAPY | Age: 69
End: 2019-05-24
Payer: MEDICARE

## 2019-05-24 PROCEDURE — 97022 WHIRLPOOL THERAPY: CPT

## 2019-05-24 PROCEDURE — 97140 MANUAL THERAPY 1/> REGIONS: CPT

## 2019-05-24 PROCEDURE — 97110 THERAPEUTIC EXERCISES: CPT

## 2019-05-24 NOTE — PROGRESS NOTES
Hot Pack: L hand/wrist for pain and stiffness. Patient tolerated well with skin intact pre and post treatment.       Paraffin: L hand/wrist for pain and stiffness. Patient tolerated well with skin intact pre and post treatment.      15 minutes Fluidotherapy: L hand/wrist for stiffness, pain. Mod agitation, mod heat. Patient tolerated well with skin intact pre and post treatment.      GOALS/ TREATMENT SESSION:      Subjective report:       Time Frame for Long term goals : 6 weeks       Long term goal 1: Patient to state <20% impairment throughout daily tasks, as measured by the DASH. Met - 12% this date []Met  [x]Partially met  []Not met   Long term goal 2: Patient to improve L wrist flexion to 50, extension to 55, UD to 24, RD to 25 and supination to 65. Continue as HEP  Flexion: 54 Met  Extension: 55  UD:  Met  RD:  Met  Supination:  Met []Met  [x]Partially met  []Not met   Long term goal 3: Patient to improve L  to 45#, 2 pt to 5#, 3 pt to 6# and lateral to 7#. Continue as HEP  : 40#  2 pt: 4#  3 pt: 4#  Lateral: 5#    []Met  [x]Partially met  []Not met   Long term goal 4: Patient to demonstrate decreased edema LIF to 7.0, LLF to 6.4, LRF to 6.4, LSF to 5.9, L thumb to 6.5, L MCP's to 19.5, and L wrist to 16.5 cm. Continue as HEP   see below []Met  [x]Partially met  []Not met   Time Frame for Short term goals: 4 weeks       Short term goal 1: Patient to demonstrate independence c HEP. MET  [x]Met  []Partially met  []Not met   Short term goal 2: Patient to improve MARTINEZ L digits >220.  Met     [x]Met  []Partially met  []Not met   Short term goal 3: Patient to improve L thumb MP to 55, IP to 49, PAB to 15.5 cm and RAB to 13.5 Met  []Met  [x]Partially met  []Not met   ADDITIONAL COMMENTS       EDEMA (measured in cm) LEFT   Index P1 6.8   Long P1 6.9   Ring P1 6.1   Small P1 6.2   Thumb P1 6.5   MCP's 19.5   Wrist 17.5           EDUCATION  New Education provided to patient/family/caregiver:    []Yes: [x]No (Continued review of prior education)   If yes Education Provided:     Method of Education:     [x]Discussion     []Demonstration    [] Written     []Other  Evaluation of Patients Response to Education:         [x]Patient and or caregiver verbalized understanding  []Patient and or Caregiver Demonstrated without assistance   []Patient and or Caregiver Demonstrated with assistance  []Needs additional instruction to demonstrate understanding of education    ASSESSMENT  Patient tolerated todays treatment session:    [x] Good   []  Fair   []  Poor  Limitations/difficulties with treatment session due to:   []Pain     []Fatigue     []Other medical complications     []Other  Goal Assessment: [] No Change    [x]Improved  Comments:    Minutes Tracking:  Time In: 0804  Time Out: 7759  Minutes: 43        PLAN  []Continue with current plan of care  [][facility]  [][facility] per patient request  [] Change Treatment plan:  [] Insurance hold  _x_ Other: follow up with patient in 2 weeks then d/c        Electronically signed by Marilee Lennox, OTD, OTR/L 5/24/2019 1:08 PM

## 2019-07-22 NOTE — DISCHARGE SUMMARY
[address]  Occupational Therapy Discharge Note    Date: 2019      Patient: Francisco Cameron  : 1950  MRN: [id number]    Physician: LARA Costa 41: Medicare   Diagnosis: Fracture of distal end of L radius, S52.592D Onset Date: 2019  Next Dr. Jim Milling: unknown  Total visits attended: 29  Cancels/No shows: 0  Date of initial visit: 3/18/19                Date of final visit: 19  [facility] #: [id number]      Subjective:  Pain:  [] Yes  [x] No  Location:  N/A Pain Rating: (0-10 scale) 0/10  Pain altered Tx:  [x] No  [] Yes  Action:  Comments:    GOALS/ TREATMENT SESSION:      Subjective report:       Time Frame for Long term goals : 6 weeks       Long term goal 1: Patient to state <20% impairment throughout daily tasks, as measured by the DASH. Met - 12% this date []Met  [x]Partially met  []Not met   Long term goal 2: Patient to improve L wrist flexion to 50, extension to 55, UD to 24, RD to 25 and supination to 65. Continue as HEP  Flexion: 54 Met  Extension: 55  UD:  Met  RD:  Met  Supination:  Met []Met  [x]Partially met  []Not met   Long term goal 3: Patient to improve L  to 45#, 2 pt to 5#, 3 pt to 6# and lateral to 7#. Continue as HEP  : 40#  2 pt: 4#  3 pt: 4#  Lateral: 5#    []Met  [x]Partially met  []Not met   Long term goal 4: Patient to demonstrate decreased edema LIF to 7.0, LLF to 6.4, LRF to 6.4, LSF to 5.9, L thumb to 6.5, L MCP's to 19.5, and L wrist to 16.5 cm. Continue as HEP   see below []Met  [x]Partially met  []Not met   Time Frame for Short term goals: 4 weeks       Short term goal 1: Patient to demonstrate independence c HEP. MET  [x]Met  []Partially met  []Not met   Short term goal 2: Patient to improve MARTINEZ L digits >220.  Met     [x]Met  []Partially met  []Not met   Short term goal 3: Patient to improve L thumb MP to 55, IP to 49, PAB to 15.5 cm and RAB to 13.5 Met  []Met  [x]Partially met  []Not met

## 2019-08-20 LAB
ABSOLUTE BASO #: 0.1 X10E9/L (ref 0–0.9)
ABSOLUTE EOS #: 0.4 X10E9/L (ref 0–0.4)
ABSOLUTE LYMPH #: 2.5 X10E9/L (ref 1–3.5)
ABSOLUTE MONO #: 0.6 X10E9/L (ref 0–0.9)
ABSOLUTE NEUT #: 4.8 X10E9/L (ref 1.5–6.6)
ALT SERPL-CCNC: 46 U/L (ref 0–40)
ANION GAP SERPL CALCULATED.3IONS-SCNC: 9 MMOL/L (ref 4–12)
AST SERPL-CCNC: 28 U/L (ref 0–41)
AVERAGE GLUCOSE: 166 MG/DL
BASOPHILS RELATIVE PERCENT: 0.9 %
BUN BLDV-MCNC: 20 MG/DL (ref 5–27)
CALCIUM SERPL-MCNC: 8.9 MG/DL (ref 8.5–10.5)
CHLORIDE BLD-SCNC: 106 MMOL/L (ref 98–109)
CO2: 27 MMOL/L (ref 22–32)
CREAT SERPL-MCNC: 1.48 MG/DL (ref 0.6–1.3)
CREATINE, URINE: 112.98 MG/DL
EGFR AFRICAN AMERICAN: 57 ML/MIN/1.73SQ.M
EGFR IF NONAFRICAN AMERICAN: 47 ML/MIN/1.73SQ.M
EOSINOPHILS RELATIVE PERCENT: 4.6 %
GLUCOSE: 153 MG/DL (ref 65–99)
HBA1C MFR BLD: 7.4 % (ref 4.4–6.4)
HCT VFR BLD CALC: 43.7 % (ref 37–51)
HEMOGLOBIN: 14.9 G/DL (ref 12.6–17.4)
LYMPHOCYTE %: 29.9 %
MCH RBC QN AUTO: 30.4 PG (ref 27–35)
MCHC RBC AUTO-ENTMCNC: 34.1 G/DL (ref 31–36)
MCV RBC AUTO: 89 FL (ref 81–101)
MICROALBUMIN/CREAT 24H UR: 43.3 MG/DL (ref 0–1.9)
MICROALBUMIN/CREAT UR-RTO: 383.3 MG/G CREAT (ref 0–30)
MONOCYTES # BLD: 7.4 %
NEUTROPHILS RELATIVE PERCENT: 57.2 %
PDW BLD-RTO: 13.6 % (ref 11.4–14.3)
PLATELETS: 225 X10E9/L (ref 150–450)
PMV BLD AUTO: 8.7 FL (ref 7–12)
POTASSIUM SERPL-SCNC: 4.1 MMOL/L (ref 3.5–5)
RBC: 4.89 X10E12/L (ref 3.9–5.8)
SODIUM BLD-SCNC: 142 MMOL/L (ref 134–146)
WBC: 8.4 X10E9/L (ref 4.4–12)

## 2019-08-28 ENCOUNTER — OFFICE VISIT (OUTPATIENT)
Dept: FAMILY MEDICINE CLINIC | Age: 69
End: 2019-08-28
Payer: MEDICARE

## 2019-08-28 VITALS — BODY MASS INDEX: 34.12 KG/M2 | HEIGHT: 65 IN | WEIGHT: 204.8 LBS

## 2019-08-28 DIAGNOSIS — N18.30 CHRONIC KIDNEY DISEASE, STAGE III (MODERATE) (HCC): ICD-10-CM

## 2019-08-28 DIAGNOSIS — H61.21 IMPACTED CERUMEN OF RIGHT EAR: ICD-10-CM

## 2019-08-28 DIAGNOSIS — M10.9 GOUT, UNSPECIFIED CAUSE, UNSPECIFIED CHRONICITY, UNSPECIFIED SITE: Primary | ICD-10-CM

## 2019-08-28 DIAGNOSIS — N18.30 CHRONIC RENAL FAILURE, STAGE 3 (MODERATE) (HCC): ICD-10-CM

## 2019-08-28 DIAGNOSIS — E78.5 HYPERLIPIDEMIA, UNSPECIFIED HYPERLIPIDEMIA TYPE: ICD-10-CM

## 2019-08-28 DIAGNOSIS — Z91.81 AT HIGH RISK FOR FALLS: ICD-10-CM

## 2019-08-28 DIAGNOSIS — E11.9 TYPE 2 DIABETES MELLITUS WITHOUT COMPLICATION, UNSPECIFIED WHETHER LONG TERM INSULIN USE (HCC): ICD-10-CM

## 2019-08-28 DIAGNOSIS — I10 ESSENTIAL HYPERTENSION: ICD-10-CM

## 2019-08-28 DIAGNOSIS — R55 VASODEPRESSOR SYNCOPE: ICD-10-CM

## 2019-08-28 PROCEDURE — 99214 OFFICE O/P EST MOD 30 MIN: CPT | Performed by: FAMILY MEDICINE

## 2019-08-28 PROCEDURE — G8417 CALC BMI ABV UP PARAM F/U: HCPCS | Performed by: FAMILY MEDICINE

## 2019-08-28 PROCEDURE — 1123F ACP DISCUSS/DSCN MKR DOCD: CPT | Performed by: FAMILY MEDICINE

## 2019-08-28 PROCEDURE — 3017F COLORECTAL CA SCREEN DOC REV: CPT | Performed by: FAMILY MEDICINE

## 2019-08-28 PROCEDURE — G8427 DOCREV CUR MEDS BY ELIG CLIN: HCPCS | Performed by: FAMILY MEDICINE

## 2019-08-28 PROCEDURE — 1036F TOBACCO NON-USER: CPT | Performed by: FAMILY MEDICINE

## 2019-08-28 PROCEDURE — 4040F PNEUMOC VAC/ADMIN/RCVD: CPT | Performed by: FAMILY MEDICINE

## 2019-08-28 PROCEDURE — 3045F PR MOST RECENT HEMOGLOBIN A1C LEVEL 7.0-9.0%: CPT | Performed by: FAMILY MEDICINE

## 2019-08-28 PROCEDURE — 2022F DILAT RTA XM EVC RTNOPTHY: CPT | Performed by: FAMILY MEDICINE

## 2019-08-28 RX ORDER — ALLOPURINOL 300 MG/1
150 TABLET ORAL DAILY
Qty: 45 TABLET | Refills: 3 | Status: SHIPPED | OUTPATIENT
Start: 2019-08-28 | End: 2020-08-25

## 2019-08-28 NOTE — PROGRESS NOTES
I, PARISA Coe, am scribing for and in the presence of Dr. Glenn Weinberg. 08/28/19 1:39 pm [address] [facility],[address]  [address]  [place] [address]  Dept: [phone]    Ryann Finnegan is a 68 y.o. male here for 6 Month Follow-Up; Hypertension; Hyperlipidemia; and Diabetes      HPI:  DIABETES MELLITUS TYPE II   Medication compliance: compliant most of the time  Diabetic diet compliance: compliant most of the time  Current exercise: none  How long are you exercising during the week? n/a  Frequency of testing: none  Any episodes of hypoglycemia? no  Eye exam current (within one year): Yes, Dr. Ramirez Neighbor  Diabetic foot check in the past year Yes, 2/2019   reports that he quit smoking about 5 years ago. His smoking use included Cigarettes, Pipe, and Cigars. He quit after 30.00 years of use. He has never used smokeless tobacco.      HYPERTENSION  He is not exercising and is adherent to a low-salt diet.  Blood pressure is not being monitored at home. Cardiac symptoms: none. Patient denies: chest pain, irregular heart beat and palpitations.     HYPERLIPIDEMIA    Medication compliance: compliant all of the time. Patient is  following a low fat, low cholesterol diet.  He is not exercising. Prior to Admission medications    Medication Sig Start Date End Date Taking?  Authorizing Provider   allopurinol (ZYLOPRIM) 300 MG tablet Take 0.5 tablets by mouth daily 8/28/19  Yes Glenn Weinberg MD   Dulaglutide (TRULICITY) 1.5 BJ/6.4QU SOPN Inject 1.5 mg into the skin once a week 6/20/19  Yes Glenn Weinberg MD   lisinopril (PRINIVIL;ZESTRIL) 2.5 MG tablet TAKE ONE TABLET BY MOUTH DAILY 12/18/18  Yes Glenn Weinberg MD   metFORMIN (GLUCOPHAGE-XR) 500 MG extended release tablet TAKE ONE TABLET BY MOUTH DAILY WITH BREAKFAST 12/17/18  Yes Glenn Weinberg MD   CINNAMON PO Take 1 tablet by mouth 3 times daily    Yes Historical Provider, MD   LUTEIN-ZEAXANTHIN PO Take 1 tablet by mouth daily Yes Historical Provider, MD   therapeutic multivitamin-minerals (THERAGRAN-M) tablet Take 1 tablet by mouth daily. Yes Historical Provider, MD   vitamin D (CHOLECALCIFEROL) 1000 UNIT TABS tablet Take 1,000 Units by mouth daily. Yes Historical Provider, MD       ROS:  General Constitutional: Denies chills. Denies fever. Denies headache. Denies lightheadedness. Ophthalmologic: Denies blurred vision. ENT: Denies nasal congestion. Denies sore throat. Denies ear pain and pressure. Respiratory: Denies cough. Denies shortness of breath. Denies wheezing. Cardiovascular: Denies chest pain at rest. Denies irregular heartbeat. Denies palpitations. Gastrointestinal: Denies abdominal pain. Denies blood in the stool. Denies constipation. Denies diarrhea. Denies nausea. Denies vomiting. Genitourinary: Denies blood in the urine. Denies difficulty urinating. Denies frequent urination. Denies painful urination. Denies urinary incontinence. Musculoskeletal: Denies muscle aches. Denies painful joints. Denies swollen joints. Admits gout flare up a month ago. Peripheral Vascular: Denies pain/cramping in legs after exertion. Skin: Denies dry skin. Denies itching. Denies rash. Neurologic: Denies falls. Denies dizziness. Denies fainting. Denies tingling/numbness. Psychiatric: Denies sleep disturbance. Denies anxiety. Denies depressed mood.     Past Surgical History:   Procedure Laterality Date    APPENDECTOMY  1974    COLONOSCOPY  05/13/2019    -polyps(tubular adenomas)hemorrhoids    COLONOSCOPY N/A 5/13/2019    COLONOSCOPY POLYPECTOMY SNARE/COLD BIOPSY performed by Santa Mccormick MD at [address]    (-)    CYSTOSCOPY  03/2012    retrograde Dr. [place] Like: (-)    CYSTOURETHROSCOPY  4/3/00,6/13/89, 3/16/2012    TONSILLECTOMY AND ADENOIDECTOMY      WRIST FRACTURE SURGERY Left     ORIF       Family History   Problem Relation Age of Onset   [facility] Cancer Father         leukemia    Depression Sister

## 2019-09-23 ENCOUNTER — NURSE ONLY (OUTPATIENT)
Dept: FAMILY MEDICINE CLINIC | Age: 69
End: 2019-09-23
Payer: MEDICARE

## 2019-09-23 PROCEDURE — 69209 REMOVE IMPACTED EAR WAX UNI: CPT | Performed by: FAMILY MEDICINE

## 2019-12-13 DIAGNOSIS — I10 ESSENTIAL HYPERTENSION: ICD-10-CM

## 2019-12-13 DIAGNOSIS — E11.21 TYPE 2 DIABETES MELLITUS WITH DIABETIC NEPHROPATHY, WITHOUT LONG-TERM CURRENT USE OF INSULIN (HCC): ICD-10-CM

## 2019-12-13 RX ORDER — METFORMIN HYDROCHLORIDE 500 MG/1
TABLET, EXTENDED RELEASE ORAL
Qty: 90 TABLET | Refills: 2 | Status: SHIPPED | OUTPATIENT
Start: 2019-12-13 | End: 2020-08-25

## 2019-12-13 RX ORDER — LISINOPRIL 2.5 MG/1
TABLET ORAL
Qty: 90 TABLET | Refills: 2 | Status: SHIPPED | OUTPATIENT
Start: 2019-12-13 | End: 2020-09-21

## 2020-02-18 LAB
ALT SERPL-CCNC: 59 U/L (ref 0–40)
ANION GAP SERPL CALCULATED.3IONS-SCNC: 10 MMOL/L (ref 5–15)
AST SERPL-CCNC: 36 U/L (ref 0–41)
AVERAGE GLUCOSE: 180 MG/DL
BANDS PRESENT: 5.7 %
BASOPHILS # BLD: 1.9 %
BASOPHILS ABSOLUTE: 0.2 X10E9/L (ref 0–0.9)
BUN BLDV-MCNC: 19 MG/DL (ref 5–27)
CALCIUM SERPL-MCNC: 9.5 MG/DL (ref 8.5–10.5)
CHLORIDE BLD-SCNC: 104 MMOL/L (ref 98–109)
CHOLESTEROL/HDL RATIO: 5.9 (ref 1–5)
CHOLESTEROL: 232 MG/DL (ref 150–200)
CO2: 24 MMOL/L (ref 22–32)
CREAT SERPL-MCNC: 1.47 MG/DL (ref 0.6–1.3)
EGFR AFRICAN AMERICAN: 57 ML/MIN/1.73SQ.M
EGFR IF NONAFRICAN AMERICAN: 47 ML/MIN/1.73SQ.M
EOSINOPHIL # BLD: 4.7 %
EOSINOPHILS ABSOLUTE: 0.4 X10E9/L (ref 0–0.4)
GLUCOSE: 156 MG/DL (ref 65–99)
HBA1C MFR BLD: 7.9 % (ref 4.4–6.4)
HCT VFR BLD CALC: 47 % (ref 37–51)
HDLC SERPL-MCNC: 39 MG/DL
HEMOGLOBIN: 15.9 G/DL (ref 12.6–17.4)
LDL CHOLESTEROL CALCULATED: 125 MG/DL
LDL/HDL RATIO: 3.2
LYMPHOCYTES # BLD: 28.6 %
LYMPHOCYTES ABSOLUTE: 2.3 X10E9/L (ref 1–3.5)
MCH RBC QN AUTO: 31.4 PG (ref 27–35)
MCHC RBC AUTO-ENTMCNC: 33.9 G/DL (ref 31–36)
MCV RBC AUTO: 93 FL (ref 81–101)
MONOCYTES # BLD: 4.8 %
MONOCYTES ABSOLUTE: 0.4 X10E9/L (ref 0–0.9)
NEUTROPHILS ABSOLUTE: 4.8 X10E9/L (ref 1.5–6.6)
NEUTROPHILS RELATIVE PERCENT: 54.3 %
PDW BLD-RTO: 14.3 % (ref 11.4–14.3)
PLATELETS: 188 X10E9/L (ref 150–450)
PMV BLD AUTO: 8.9 FL (ref 7–12)
POTASSIUM SERPL-SCNC: 4 MMOL/L (ref 3.5–5)
RBC # BLD: NORMAL 10*6/UL
RBC: 5.07 X10E12/L (ref 3.9–5.8)
SODIUM BLD-SCNC: 138 MMOL/L (ref 134–146)
TRIGL SERPL-MCNC: 339 MG/DL (ref 27–150)
URIC ACID: 7.1 MG/DL (ref 2.6–7.2)
VLDLC SERPL CALC-MCNC: 68 MG/DL (ref 0–30)
WBC: 7.9 X10E9/L (ref 4.4–12)

## 2020-02-25 ENCOUNTER — OFFICE VISIT (OUTPATIENT)
Dept: FAMILY MEDICINE CLINIC | Age: 70
End: 2020-02-25
Payer: MEDICARE

## 2020-02-25 VITALS
WEIGHT: 203.2 LBS | SYSTOLIC BLOOD PRESSURE: 118 MMHG | BODY MASS INDEX: 33.85 KG/M2 | HEIGHT: 65 IN | DIASTOLIC BLOOD PRESSURE: 80 MMHG

## 2020-02-25 PROCEDURE — G8484 FLU IMMUNIZE NO ADMIN: HCPCS | Performed by: FAMILY MEDICINE

## 2020-02-25 PROCEDURE — G8427 DOCREV CUR MEDS BY ELIG CLIN: HCPCS | Performed by: FAMILY MEDICINE

## 2020-02-25 PROCEDURE — 3051F HG A1C>EQUAL 7.0%<8.0%: CPT | Performed by: FAMILY MEDICINE

## 2020-02-25 PROCEDURE — 99213 OFFICE O/P EST LOW 20 MIN: CPT | Performed by: FAMILY MEDICINE

## 2020-02-25 PROCEDURE — 4040F PNEUMOC VAC/ADMIN/RCVD: CPT | Performed by: FAMILY MEDICINE

## 2020-02-25 PROCEDURE — 99214 OFFICE O/P EST MOD 30 MIN: CPT | Performed by: FAMILY MEDICINE

## 2020-02-25 PROCEDURE — G8417 CALC BMI ABV UP PARAM F/U: HCPCS | Performed by: FAMILY MEDICINE

## 2020-02-25 PROCEDURE — 1123F ACP DISCUSS/DSCN MKR DOCD: CPT | Performed by: FAMILY MEDICINE

## 2020-02-25 PROCEDURE — 1036F TOBACCO NON-USER: CPT | Performed by: FAMILY MEDICINE

## 2020-02-25 PROCEDURE — 3017F COLORECTAL CA SCREEN DOC REV: CPT | Performed by: FAMILY MEDICINE

## 2020-02-25 PROCEDURE — 2022F DILAT RTA XM EVC RTNOPTHY: CPT | Performed by: FAMILY MEDICINE

## 2020-02-25 SDOH — ECONOMIC STABILITY: FOOD INSECURITY: WITHIN THE PAST 12 MONTHS, THE FOOD YOU BOUGHT JUST DIDN'T LAST AND YOU DIDN'T HAVE MONEY TO GET MORE.: NEVER TRUE

## 2020-02-25 SDOH — ECONOMIC STABILITY: INCOME INSECURITY: HOW HARD IS IT FOR YOU TO PAY FOR THE VERY BASICS LIKE FOOD, HOUSING, MEDICAL CARE, AND HEATING?: NOT HARD AT ALL

## 2020-02-25 SDOH — ECONOMIC STABILITY: FOOD INSECURITY: WITHIN THE PAST 12 MONTHS, YOU WORRIED THAT YOUR FOOD WOULD RUN OUT BEFORE YOU GOT MONEY TO BUY MORE.: NEVER TRUE

## 2020-02-25 ASSESSMENT — PATIENT HEALTH QUESTIONNAIRE - PHQ9
SUM OF ALL RESPONSES TO PHQ QUESTIONS 1-9: 0
SUM OF ALL RESPONSES TO PHQ QUESTIONS 1-9: 0
1. LITTLE INTEREST OR PLEASURE IN DOING THINGS: 0
2. FEELING DOWN, DEPRESSED OR HOPELESS: 0
SUM OF ALL RESPONSES TO PHQ9 QUESTIONS 1 & 2: 0

## 2020-02-25 NOTE — PROGRESS NOTES
PARISA [place], am scribing for and in the presence of Dr. Richard Burnham. 2/25/20/1:25pm/SNP    [address]  [place] [id number]  Dept: [phone]    Mayra Vargas is a 68 y.o. male here for 6 Month Follow-Up; Diabetes; Hypertension; and Hyperlipidemia    HPI:  DIABETES MELLITUS TYPE II   Medication compliance: compliant most of the time  Diabetic diet compliance: compliant most of the time  Current exercise: none  How long are you exercising during the week? n/a  Frequency of testing: none  Any episodes of hypoglycemia? no  Eye exam current (within one year): Yes, Dr. Mason Alba, 12/20  Diabetic foot check in the past year Yes, 2/2019   reports that he quit smoking about 5 years ago. His smoking use included Cigarettes, Pipe, and Cigars. He quit after 30.00 years of use. He has never used smokeless tobacco.      HYPERTENSION  He is not exercising and is adherent to a low-salt diet.  Blood pressure is not being monitored at home. Cardiac symptoms: none. Patient denies: chest pain, irregular heart beat and palpitations.     HYPERLIPIDEMIA    Medication compliance: compliant all of the time. Patient is  following a low fat, low cholesterol diet. He is not exercising. Prior to Admission medications    Medication Sig Start Date End Date Taking?  Authorizing Provider   Homeopathic Products (OSCILLOCOCCINUM PO) Take by mouth   Yes Historical Provider, MD Cayla Lyon 50 MG/5ML SYRP Take by mouth   Yes Historical Provider, MD   lisinopril (PRINIVIL;ZESTRIL) 2.5 MG tablet TAKE ONE TABLET BY MOUTH DAILY 12/13/19  Yes Richard Burnham MD   metFORMIN (GLUCOPHAGE-XR) 500 MG extended release tablet TAKE ONE TABLET BY MOUTH DAILY WITH BREAKFAST 12/13/19  Yes Richard Burnham MD   allopurinol (ZYLOPRIM) 300 MG tablet Take 0.5 tablets by mouth daily 8/28/19  Yes Richard Burnham MD   Dulaglutide (TRULICITY) 1.5 PW/4.2KF SOPN Inject 1.5 mg into the skin once a week 6/20/19  Yes Valorie Santiago MD   LUTEIN-ZEAXANTHIN PO Take 1 tablet by mouth daily   Yes Historical Provider, MD   therapeutic multivitamin-minerals [facility]) tablet Take 1 tablet by mouth daily. Yes Historical Provider, MD   vitamin D (CHOLECALCIFEROL) 1000 UNIT TABS tablet Take 1,000 Units by mouth daily. Yes Historical Provider, MD   CINNAMON PO Take 1 tablet by mouth 3 times daily     Historical Provider, MD       ROS:  General Constitutional: Denies chills. Denies fever. Denies headache. Denies lightheadedness. Ophthalmologic: Denies blurred vision. ENT: Denies nasal congestion. Denies sore throat. Denies ear pain and pressure. Respiratory: Denies cough. Denies shortness of breath. Denies wheezing. Cardiovascular: Denies chest pain at rest. Denies irregular heartbeat. Denies palpitations. Gastrointestinal: Denies abdominal pain. Denies blood in the stool. Denies constipation. Denies diarrhea. Denies nausea. Denies vomiting. Genitourinary: Denies blood in the urine. Denies difficulty urinating. Denies frequent urination. Denies painful urination. Denies urinary incontinence. Musculoskeletal: Denies muscle aches. Denies painful joints. Denies swollen joints. Peripheral Vascular: Denies pain/cramping in legs after exertion. Skin: Denies dry skin. Denies itching. Denies rash. Neurologic: Denies falls. Denies dizziness. Denies fainting. Denies tingling/numbness. Psychiatric: Denies sleep disturbance. Denies anxiety. Denies depressed mood.     Past Surgical History:   Procedure Laterality Date    APPENDECTOMY  1974    COLONOSCOPY  05/13/2019    -polyps(tubular adenomas)hemorrhoids    COLONOSCOPY N/A 5/13/2019    COLONOSCOPY POLYPECTOMY SNARE/COLD BIOPSY performed by Ashley Cruz MD at [facility]    (-)    CYSTOSCOPY  03/2012    retrograde [address]: (-)    CYSTOURETHROSCOPY  4/3/00,6/13/89, 3/16/2012    TONSILLECTOMY AND ADENOIDECTOMY      WRIST FRACTURE mouth daily.  CINNAMON PO Take 1 tablet by mouth 3 times daily        No current facility-administered medications for this visit. No Known Allergies    PHYSICAL EXAM:    /80 (Site: Left Upper Arm, Position: Sitting, Cuff Size: Medium Adult)   Ht 5' 5\" (1.651 m)   Wt 203 lb 3.2 oz (92.2 kg)   BMI 33.81 kg/m²   Wt Readings from Last 3 Encounters:   02/25/20 203 lb 3.2 oz (92.2 kg)   08/28/19 204 lb 12.8 oz (92.9 kg)   05/13/19 200 lb (90.7 kg)     BP Readings from Last 3 Encounters:   02/25/20 118/80   05/13/19 125/88   05/13/19 106/74     General Appearance: in no acute distress, well developed, well nourished. Eyes: pupils equal, round reactive to light and accommodation. Ears: normal canal and TM's. Nose: nares patent, no lesions. Oral Cavity: mucosa moist.  Throat: clear. Neck/Thyroid: neck supple, full range of motion, no cervical lymphadenopathy, no thyromegaly or carotid bruits. Skin: warm and dry. No suspicious lesions. Seborrhea on scalp, scaling, erythematous, flaking. Dry patches on L forearm, keratosis on R forearm  Heart: regular rate and rhythm. No murmurs. S1, S2 normal, no gallops. Lungs: clear to auscultation bilaterally. Abdomen: bowel sounds present, soft, nontender, nondistended, no masses or organomegaly. Musculoskeletal: normal, full range of motion in knees and hips, no swelling or tenderness. Extremities: no cyanosis or edema. Peripheral Pulses: 2+ throughout, symetric. Neurologic: nonfocal, motor strength normal upper and lower extremities, sensory exam intact. Psych: normal affect, speech fluent. ASSESSMENT:   Diagnosis Orders   1. Uncontrolled diabetes mellitus type 2 without complications ([address]  Hemoglobin A1C    Microalbumin, Ur    [facility]   2. Essential hypertension     3. Acute gout involving toe of left foot, unspecified cause  Uric Acid   4.  Hyperlipidemia, unspecified hyperlipidemia type  ALT    AST    Basic Metabolic Panel    CBC    CRP,High Sensitivity    Lipid Panel       PLAN:  Labs reviewed, I am overall pleased with these results. His A1C crept up a bit again. I will refer him back to the dietician. I would like for him to get Nizoral, Head and Shoulders and Selsum Blue shampoos and rotate the use of these to help with the seborrhea on his scalp. I'd like for him to get his Shingles vaccine. I will see him back in 6 months. Orders Placed This Encounter   Procedures    ALT     Standing Status:   Future     Standing Expiration Date:   2/24/2021    AST     Standing Status:   Future     Standing Expiration Date:   2/24/2021    Basic Metabolic Panel     Standing Status:   Future     Standing Expiration Date:   2/24/2021    CBC     Standing Status:   Future     Standing Expiration Date:   2/24/2021   [facility] CRP,High Sensitivity     Standing Status:   Future     Standing Expiration Date:   2/24/2021    Hemoglobin A1C     Standing Status:   Future     Standing Expiration Date:   2/24/2021    Lipid Panel     Standing Status:   Future     Standing Expiration Date:   2/24/2021     Order Specific Question:   Is Patient Fasting?/# of Hours     Answer:   no fasting    Uric Acid     Standing Status:   Future     Standing Expiration Date:   2/24/2021    Microalbumin, Ur     Standing Status:   Future     Standing Expiration Date:   2/25/2021   [facility] [place]     Referral Priority:   Routine     Referral Type:   Eval and Treat     Referral Reason:   Specialty Services Required     Number of Visits Requested:   1     No orders of the defined types were placed in this encounter. I, Dr. John Cervantes, personally performed the services described in this documentation as scribed by TITO Egan in my presence, and is both accurate and complete.

## 2020-02-25 NOTE — PATIENT INSTRUCTIONS
PLAN:  Labs reviewed, I am overall pleased with these results. His A1C crept up a bit again. I will refer him back to the dietician. I would like for him to get Nizoral, Head and Shoulders and Selsum Blue shampoos and rotate the use of these to help with the seborrhea on his scalp. I'd like for him to get his Shingles vaccine. I will see him back in 6 months. SURVEY:    You may be receiving a survey from [employer] regarding your visit today. Please complete the survey to enable us to provide the highest quality of care to you and your family. If you cannot score us a very good on any question, please call the office to discuss how we could have made your experience a very good one. Thank you.

## 2020-03-05 ENCOUNTER — HOSPITAL ENCOUNTER (OUTPATIENT)
Dept: DIABETES SERVICES | Age: 70
Setting detail: THERAPIES SERIES
Discharge: HOME | End: 2020-03-05
Payer: MEDICARE

## 2020-03-05 PROCEDURE — G0108 DIAB MANAGE TRN  PER INDIV: HCPCS

## 2020-03-05 SDOH — ECONOMIC STABILITY: FOOD INSECURITY: ADDITIONAL INFORMATION: NO

## 2020-03-05 NOTE — PROGRESS NOTES
Diabetes Self-Management Education Record    Progress Note:  Patient is seen for diabetes education initial assessment with wife in attendance. He has had Type 2 diabetes since 2012 and previously attended education at diagnosis in [facility]. He reports he does not remember much and has a folder at home but has not looked at it. His A1C on 02/18/20 was 7.9. He was running in the 7.0 range. No immediate family members had the diagnosis. His wife does the cooking. They do not look at the food label except sometimes the sodium content. Portions are not measured and he states \"I like my pies, cakes, and cookies. \"  There are no sweetened beverages being consumed. Reviewed his triglyceride and cholesterol levels with him as well and discuss how to lower these with healthier food choices and adding exercise. He used to walk daily but now there is no activity. He does engage in conversation regarding activity and is willing to try to add exercise. He likes to walk and thinks this is a realistic activity he can do. Encourage him to find activities to do indoors when the weather does not cooperate and he does have a treadmill and a bike. Encourage him to try to place these in front of the TV as he enjoys watching TV. Reviewed where carbohydrates are found and encourage him to aim for around 60 grams per meal.  Reviewed the plate method to provide a visual of what the plate should look like and trying to use a smaller plate. Discussed serving sizes of carbohydrates and encourage them to decrease portions. He is not monitoring glucose but does have a meter. Encourage him to monitor if he is willing to evaluate changes and if he decides to monitor glucose he will need to check his supplies and expiration dates on strips. If needed he is instructed to call PCP for new RX for strips and lancets. Glucose in office is 156 and this is fasting.     He has regular eye exams and reviewed the importance of checking his feet daily and wearing proper footwear. Handout given. He seems open to coming to classes but his wife is having surgery next week and maybe a few weeks until they can attend. Dietitian appointment is scheduled for Tuesday. All questions answered and office number is given to call with questions or concerns.       Participant Name: Ramos Guevara   Referring Provider:  Benuel Brittle, MD    Keys to learning:  Considerations: []Language []Emotional []Health Literacy  []Cognitive []Memory changes []Financial []Cultural   []Taoist []Vision []Hearing  []Speech []Lack of desire  []Literacy  []Psycho-social  [x]None  If considerations are noted, accommodations made: none    Identified barriers to learning/self management:     The following information was discussed:    [x] Diabetes disease process and treatment options   [x] Healthy nutrition, carbohydrate counting, meal planning  [] Monitoring blood glucose and other parameters; interpreting and using results  [] Acute complications--prevention, detection and treatment  [] Medication management and safety   [x] Incorporating physical activity into lifestyle   [x] Exercise for Health, Reducing Risks for Heart Disease, Diabetes and Heart Health  [] Preventing, through risk reduction behaviors, detecting, and treating chronic complications  [] Sick Day management  [x] Developing personalized strategies to address psychosocial issues and concerns  [x] Developing personalized strategies to promote health and behavior change through goalsetting, behavior change strategies aimed at risk reduction  [] Special situations--disaster planning, travel, social activities    Session Assessment & Evaluation Ratings:  1=Needs Instruction  2=Needs Review  3=Comprehends Key Points  4=Demonstrates Understanding/Competency  NC=Not Covered   N/A=Not Applicable Initial  Assess    Date:  03/05/20 2nd  Visit     Date:   3rd  Visit    Date: 4th  Visit    Date: Comments  S.O.C=Stage of Change/Readiness to change:  · Pre=Pre-contemplation stage--not thinking about changing  · C=Contemplation stage--ambivalent about changing  · P=Preparation stage--prepared to made a specific change  · A=Action stage--committed to modify behaviors  · M=Maintenance and relapse prevention--incorporating new behavior   Participant Stated  Goal      I will begin exercising slowly and work up to 30 minutes a day. I will walk or use the treadmill or bike. Participant Stated Goal  I will decrease my portions. S.O.C  [x] PRE  [] C  [] P      [] A  [] M                    S.O.C  [x] PRE  [] C  [] P      [] A  [] M     S.O.C  [] PRE  [] C  [] P      [] A  [] M                     S.O.C  [] PRE  [] C  [] P      [] A  [] M      S.O.C  [] PRE  [] C  [] P      [] A  [] M                    S.O.C  [] PRE  [] C  [] P      [] A  [] M     S.O.C  [] PRE  [] C  [] P      [] A  [] M                    S.O.C  [] PRE  [] C  [] P      [] A  [] M   Current main goal attainment frequency:   [x] Never  [] Some  [] Half  [] Most  [] All    Participant confidence to master goal this visit:   [] Excellent  [x] Good  [] [address]  [] Poor            Current main goal attainment frequency:   [x] Never  [] Some  [] Half  [] Most  [] All    Participant confidence to master goal this visit:   [] Excellent  [x] Good [] Fair  [] Poor                  Session  Topics & Learning Objectives:      Comments:   Diabetes disease process & Treatment process: Define diabetes & prediabetes; identify own type of diabetes; role of the pancreas; signs/symptoms; diagnostic criteria; prevention and treatment options; contributing factors.                   Rating  [x] 1  [] 2  [] 3  [] 4      [] NC  [] N/A   Rating  [] 1  [] 2  [] 3  [] 4  [] NC  [] N/A     Rating  [] 1  [] 2  [] 3  [] 4  [] NC  [] N/A     Rating  [] 1  [] 2  [] 3  [] 4  [] NC  [] N/A           Incorporating nutritional management into lifestyle: Describe effect of type, amount & Prevention, detection & treatment of acute complications: List symptoms of hyper- and hypoglycemia; Describe how to treat low blood sugar & actions for lowering high blood glucose levels. Prevention and treatment strategies. Rating  [x] 1  [] 2  [] 3  [] 4  [] NC  [] N/A   Rating  [] 1  [] 2  [] 3  [] 4  [] NC  [] N/A   Rating  [] 1  [] 2  [] 3  [] 4  [] NC  [] N/A Rating  [] 1  [] 2  [] 3  [] 4  [] NC  [] N/A                   Describe sick day guidelines and indications for physician notification. Rating  [x] 1  [] 2  [] 3  [] 4  [] NC  [] N/A     Rating  [] 1  [] 2  [] 3  [] 4  [] NC  [] N/A     Rating  [] 1  [] 2  [] 3  [] 4  [] NC  [] N/A     Rating  [] 1  [] 2  [] 3  [] 4  [] NC  [] N/A        Prevention, detection & treatment of chronic complications: Define the natural course of diabetes & describe the relationship of blood glucose levels to long term complications of diabetes. Identify preventative measures and standard of care. Rating  [x] 1  [] 2  [] 3  [] 4  [] NC  [] N/A     Rating  [] 1  [] 2  [] 3  [] 4  [] NC  [] N/A     Rating  [] 1  [] 2  [] 3  [] 4  [] NC  [] N/A     Rating  [] 1  [] 2  [] 3  [] 4  [] NC  [] N/A      Developing strategies to address psychosocial issues: Describe feelings about living with diabetes; Identify support needed & support network. Describe how stress, depression, and anxiety affect blood glucose. Identify coping strategies. Rating  [x] 1  [] 2  [] 3  [] 4  [] NC  [] N/A       Rating  [] 1  [] 2  [] 3  [] 4  [] NC  [] N/A     Rating  [] 1  [] 2  [] 3  [] 4  [] NC  [] N/A     Rating  [] 1  [] 2  [] 3  [] 4  [] NC  [] N/A          Developing strategies to promote health/change behavior:  Define the ABCs of diabetes; Identify appropriate screenings, schedule & personal plan for screenings. Identify 7 self-care behaviors. Benefits, challenges and strategies for behavioral change.       Rating  [x] 1  [] 2  [] 3  [] 4  [] NC  [] N/A     Rating  [] 1  [] 2  [] 3  []

## 2020-03-10 ENCOUNTER — HOSPITAL ENCOUNTER (OUTPATIENT)
Dept: NUTRITION | Age: 70
Discharge: HOME | End: 2020-03-10
Payer: MEDICARE

## 2020-03-10 PROCEDURE — 97802 MEDICAL NUTRITION INDIV IN: CPT

## 2020-03-10 NOTE — PROGRESS NOTES
MNT provided for DM II    Altered nutrition-related lab values: A1c related to Organ/system dysfunction: DM as evidenced by Lab values:   Lab Results   Component Value Date    LABA1C 7.9 02/18/2020         Client data    Ht: 5'5\" Wt: 202# 8 oz BMI: 33.8 (obesity class I)   Weight changes: 1# weight loss CBW: 91.9 kg   BMR: 1604 calories Est. total calorie needs: ~0281-8724       Client overall goal for weight is for weight loss trend towards a healthier BMI. Current eating pattern is with errors in meal timing (does not consistently eat breakfast within 1 hour of rising). Use of whole grains, whole fruits and vegetables appear less than recommended quantities (does have fruit at breakfast and lunch. There is an excess of calories, carbohydrates, and fat per recall (cookies, corn chips, eats large portions, baked beans and mashed potatoes at same meal). Activity is low, does not participate in pre-planned physical activity. Client presents for MNT today with his wife. Felicita Klinefelter is dependent upon his wife for meal preparation and does not eat breakfast until she fixes something. Recommended having a slice of toast with peanut butter or eating a yogurt if he does not have breakfast within one hour of rising. Client was educated on carbohydrate counting with the following goals at meals and snacks:  Breakfast: 60 grams  Lunch: 45 grams  Supper: 60 grams  Bedtime Snack: 15 grams    Client received information on limiting fat in diet to lessen heart disease risk, with goals of:   Total Fat: 55 grams  Saturated Fat: 13 grams  Trans Fat: 0 grams daily     Discussed and provided literature on:    Reading food labels, carbohydrate counting, importance of consistency of meal timing (eating meals every 4.5-5 hours), importance of carbohydrate consistency (carbohydrate recommendations as noted above), importance of physical activity with a minimum goal of 30 minutes 5 days per week, healthy snack options (fruit,

## 2020-03-12 VITALS — WEIGHT: 202.5 LBS | HEIGHT: 65 IN | BODY MASS INDEX: 33.74 KG/M2

## 2020-05-26 RX ORDER — DULAGLUTIDE 1.5 MG/.5ML
INJECTION, SOLUTION SUBCUTANEOUS
Qty: 3 PEN | Refills: 2 | Status: SHIPPED | OUTPATIENT
Start: 2020-05-26 | End: 2021-02-02

## 2020-08-18 LAB
ABSOLUTE BASO #: 0.1 X10E9/L (ref 0–0.2)
ABSOLUTE EOS #: 0.3 X10E9/L (ref 0–0.4)
ABSOLUTE LYMPH #: 2.7 X10E9/L (ref 1–3.5)
ABSOLUTE MONO #: 0.6 X10E9/L (ref 0–0.9)
ABSOLUTE NEUT #: 4.8 X10E9/L (ref 1.5–6.6)
ALT SERPL-CCNC: 36 U/L (ref 0–40)
ANION GAP SERPL CALCULATED.3IONS-SCNC: 9 MMOL/L (ref 5–15)
AST SERPL-CCNC: 29 U/L (ref 0–41)
AVERAGE GLUCOSE: 171 MG/DL
BASOPHILS RELATIVE PERCENT: 1.2 %
BUN BLDV-MCNC: 22 MG/DL (ref 5–27)
CALCIUM SERPL-MCNC: 8.9 MG/DL (ref 8.5–10.5)
CHLORIDE BLD-SCNC: 103 MMOL/L (ref 98–109)
CHOLESTEROL/HDL RATIO: 4.7 (ref 1–5)
CHOLESTEROL: 222 MG/DL (ref 150–200)
CO2: 26 MMOL/L (ref 22–32)
CREAT SERPL-MCNC: 1.54 MG/DL (ref 0.6–1.3)
CREATINE, URINE: 120.08 MG/DL
EGFR AFRICAN AMERICAN: 54 ML/MIN/1.73SQ.M
EGFR IF NONAFRICAN AMERICAN: 45 ML/MIN/1.73SQ.M
EOSINOPHILS RELATIVE PERCENT: 3.6 %
GLUCOSE: 147 MG/DL (ref 65–99)
HBA1C MFR BLD: 7.6 % (ref 4.4–6.4)
HCT VFR BLD CALC: 48 % (ref 39–49)
HDLC SERPL-MCNC: 47 MG/DL
HEMOGLOBIN: 16.1 G/DL (ref 13–17)
HIGH SENSITIVE C-REACTIVE PROTEIN: 0.54 MG/DL (ref 0–0.74)
LDL CHOLESTEROL CALCULATED: 125 MG/DL
LDL/HDL RATIO: 2.7
LYMPHOCYTE %: 31.9 %
MCH RBC QN AUTO: 30.9 PG (ref 27–34)
MCHC RBC AUTO-ENTMCNC: 33.5 G/DL (ref 32–36)
MCV RBC AUTO: 92 FL (ref 80–100)
MICROALBUMIN/CREAT 24H UR: 44.7 MG/DL (ref 0–1.9)
MICROALBUMIN/CREAT UR-RTO: 372.3 MG/G CREAT (ref 0–30)
MONOCYTES # BLD: 7.1 %
NEUTROPHILS RELATIVE PERCENT: 56.2 %
PDW BLD-RTO: 14 % (ref 11.5–15)
PLATELETS: 196 X10E9/L (ref 150–450)
PMV BLD AUTO: 8.7 FL (ref 7–12)
POTASSIUM SERPL-SCNC: 4.4 MMOL/L (ref 3.5–5)
RBC: 5.21 X10E12/L (ref 4.1–5.7)
SODIUM BLD-SCNC: 138 MMOL/L (ref 134–146)
TRIGL SERPL-MCNC: 250 MG/DL (ref 27–150)
URIC ACID: 7.3 MG/DL (ref 2.6–7.2)
VLDLC SERPL CALC-MCNC: 50 MG/DL (ref 0–30)
WBC: 8.5 X10E9/L (ref 4–11)

## 2020-08-25 ENCOUNTER — OFFICE VISIT (OUTPATIENT)
Dept: FAMILY MEDICINE CLINIC | Age: 70
End: 2020-08-25
Payer: MEDICARE

## 2020-08-25 VITALS
SYSTOLIC BLOOD PRESSURE: 126 MMHG | HEIGHT: 65 IN | BODY MASS INDEX: 33.59 KG/M2 | WEIGHT: 201.6 LBS | DIASTOLIC BLOOD PRESSURE: 84 MMHG

## 2020-08-25 PROBLEM — M1A.09X0 IDIOPATHIC CHRONIC GOUT OF MULTIPLE SITES WITHOUT TOPHUS: Chronic | Status: ACTIVE | Noted: 2020-08-25

## 2020-08-25 PROCEDURE — 1036F TOBACCO NON-USER: CPT | Performed by: FAMILY MEDICINE

## 2020-08-25 PROCEDURE — 99211 OFF/OP EST MAY X REQ PHY/QHP: CPT | Performed by: FAMILY MEDICINE

## 2020-08-25 PROCEDURE — 1123F ACP DISCUSS/DSCN MKR DOCD: CPT | Performed by: FAMILY MEDICINE

## 2020-08-25 PROCEDURE — 99214 OFFICE O/P EST MOD 30 MIN: CPT | Performed by: FAMILY MEDICINE

## 2020-08-25 PROCEDURE — G8417 CALC BMI ABV UP PARAM F/U: HCPCS | Performed by: FAMILY MEDICINE

## 2020-08-25 PROCEDURE — 4040F PNEUMOC VAC/ADMIN/RCVD: CPT | Performed by: FAMILY MEDICINE

## 2020-08-25 PROCEDURE — G8427 DOCREV CUR MEDS BY ELIG CLIN: HCPCS | Performed by: FAMILY MEDICINE

## 2020-08-25 PROCEDURE — 3017F COLORECTAL CA SCREEN DOC REV: CPT | Performed by: FAMILY MEDICINE

## 2020-08-25 PROCEDURE — 3051F HG A1C>EQUAL 7.0%<8.0%: CPT | Performed by: FAMILY MEDICINE

## 2020-08-25 PROCEDURE — 2022F DILAT RTA XM EVC RTNOPTHY: CPT | Performed by: FAMILY MEDICINE

## 2020-08-25 RX ORDER — ALLOPURINOL 300 MG/1
TABLET ORAL
Qty: 45 TABLET | Refills: 2 | Status: SHIPPED | OUTPATIENT
Start: 2020-08-25 | End: 2021-05-20

## 2020-08-25 RX ORDER — KETOCONAZOLE 200 MG/1
200 TABLET ORAL DAILY
Qty: 14 TABLET | Refills: 0 | Status: SHIPPED | OUTPATIENT
Start: 2020-08-25 | End: 2020-09-24 | Stop reason: ALTCHOICE

## 2020-08-25 RX ORDER — METFORMIN HYDROCHLORIDE 500 MG/1
500 TABLET, EXTENDED RELEASE ORAL 2 TIMES DAILY
Qty: 180 TABLET | Refills: 2 | Status: SHIPPED | OUTPATIENT
Start: 2020-08-25 | End: 2020-09-21

## 2020-08-25 NOTE — PROGRESS NOTES
PARISA Christian, am scribing for and in the presence of Dr. Carlton Burgos. [address]  [id number]  Dept: [phone]    Chalino Bo is a 65 y.o. male here for 6 Month Follow-Up; Diabetes; Hypertension; and Hyperlipidemia    HPI:  DIABETES MELLITUS TYPE II   Medication compliance: compliant most of the time  Diabetic diet compliance: compliant most of the time  Current exercise: none  Frequency of testing: none  Any episodes of hypoglycemia? no  Eye exam current (within one year): Yes, Dr. Orion Henderson, 8/20  Diabetic foot check in the past year Yes, today   reports that he quit smoking about 5 years ago. His smoking use included Cigarettes, Pipe, and Cigars. He quit after 30.00 years of use. He has never used smokeless tobacco.      HYPERTENSION  He is not exercising and is adherent to a low-salt diet.  Blood pressure is not being monitored at home. Cardiac symptoms: none. Patient denies: chest pain, irregular heart beat and palpitations.     HYPERLIPIDEMIA    Medication compliance: compliant all of the time. Patient is  following a low fat, low cholesterol diet. He is not exercising. Prior to Admission medications    Medication Sig Start Date End Date Taking?  Authorizing Provider   allopurinol (ZYLOPRIM) 300 MG tablet TAKE ONE-HALF TABLET BY MOUTH DAILY 8/25/20  Yes Carlton Burgos MD   ketoconazole (NIZORAL) 200 MG tablet Take 1 tablet by mouth daily 8/25/20  Yes Carlton Burgos MD   metFORMIN (GLUCOPHAGE-XR) 500 MG extended release tablet Take 1 tablet by mouth 2 times daily 8/25/20  Yes Carlton Burgos MD   TRULICITY 1.5 CY/7.3SI SOPN INJECT 1.5 MG UNDER THE SKIN ONCE WEEKLY 5/26/20  Yes Carlton Burgos MD   Homeopathic Products (OSCILLOCOCCINUM PO) Take by mouth   Yes Historical Provider, MD Kary Perdomo 50 MG/5ML SYRP Take by mouth   Yes Historical Provider, MD   lisinopril (PRINIVIL;ZESTRIL) 2.5 MG tablet TAKE ONE TABLET BY FRACTURE SURGERY Left     ORIF       Family History   Problem Relation Age of Onset    Cancer Father         leukemia    Depression Sister        Past Medical History:   Diagnosis Date    Benign hematuria     Diabetes mellitus ([facility] [address] 2013    A1C 7.2    Esophageal reflux 2001    Gastritis     GERD (gastroesophageal reflux disease)     Hematuria     Hyperglycemia 1999    Hyperlipidemia     Hypertension     Obesity, unspecified 2015    Other abnormal glucose 2015    Other and unspecified hyperlipidemia 1992    Renal cyst     bilat    Syncope and collapse 2015    Type II or unspecified type diabetes mellitus without mention of complication, not stated as uncontrolled     Unspecified essential hypertension 10/1991    borderline    Vasodepressor syncope 2006      Social History     Tobacco Use    Smoking status: Former Smoker     Packs/day: 0.75     Years: 30.00     Pack years: 22.50     Types: Cigars, Cigarettes, Pipe     Last attempt to quit: 2012     Years since quittin.5    Smokeless tobacco: Never Used   Substance Use Topics    Alcohol use: No     Alcohol/week: 0.0 standard drinks      Current Outpatient Medications   Medication Sig Dispense Refill    allopurinol (ZYLOPRIM) 300 MG tablet TAKE ONE-HALF TABLET BY MOUTH DAILY 45 tablet 2    ketoconazole (NIZORAL) 200 MG tablet Take 1 tablet by mouth daily 14 tablet 0    metFORMIN (GLUCOPHAGE-XR) 500 MG extended release tablet Take 1 tablet by mouth 2 times daily 180 tablet 2    TRULICITY 1.5 CI/2.1QI SOPN INJECT 1.5 MG UNDER THE SKIN ONCE WEEKLY 3 pen 2    Homeopathic Products (OSCILLOCOCCINUM PO) Take by mouth      Black Elderberry 50 MG/5ML SYRP Take by mouth      lisinopril (PRINIVIL;ZESTRIL) 2.5 MG tablet TAKE ONE TABLET BY MOUTH DAILY 90 tablet 2    CINNAMON PO Take 1 tablet by mouth 3 times daily       LUTEIN-ZEAXANTHIN PO Take 1 tablet by mouth daily      vitamin D (CHOLECALCIFEROL) 1000 Gastroesophageal reflux disease without esophagitis     3. Essential hypertension  CBC    AST    ALT   4. Hyperlipidemia, unspecified hyperlipidemia type  Lipid Panel    CRP,High Sensitivity   5. Tinea corporis     6. Chronic renal failure, stage 3 (moderate) ([facility])  Alec Villareal MD, NephrologyBecky   7. Tinea capitis  ketoconazole (NIZORAL) 200 MG tablet   8. Class 1 obesity with serious comorbidity and body mass index (BMI) of 32.0 to 32.9 in adult, unspecified obesity type     9. Idiopathic chronic gout of multiple sites without tophus       PLAN:  Labs reviewed, his kidney function is worsening. I will refer him to Dr. Jose M Carrillo to further evaluate his kidney function. His A1C is also increasing. He is taking the highest dose of Trulicity. I will increase his Metformin to 500 mg twice daily and see if this improves his diabetes control. As always, I highly recommend for him to watch his carbs and sodium intake. I will treat him with Nizoral 200 mg daily for 2 weeks.      I will see him back in 6 months for his Medicare wellness    Orders Placed This Encounter   Procedures    Lipid Panel     Standing Status:   Future     Standing Expiration Date:   8/25/2021     Order Specific Question:   Is Patient Fasting?/# of Hours     Answer:   n/a    Hemoglobin A1C     Standing Status:   Future     Standing Expiration Date:   8/25/2021   [facility] CRP,High Sensitivity     Standing Status:   Future     Standing Expiration Date:   8/25/2021    CBC     Standing Status:   Future     Standing Expiration Date:   8/25/2021    Basic Metabolic Panel     Standing Status:   Future     Standing Expiration Date:   8/25/2021    AST     Standing Status:   Future     Standing Expiration Date:   8/25/2021    ALT     Standing Status:   Future     Standing Expiration Date:   8/25/2021   Alec Villareal MD, Nephrology, Becky     Referral Priority:   Routine     Referral Type:   Eval and Treat     Referral Reason:   Specialty Services Required     Referred to Provider:   Mony Pelletier MD     Requested Specialty:   Nephrology     Number of Visits Requested:   1     DIABETES FOOT EXAM     Orders Placed This Encounter   Medications    ketoconazole (NIZORAL) 200 MG tablet     Sig: Take 1 tablet by mouth daily     Dispense:  14 tablet     Refill:  0    metFORMIN (GLUCOPHAGE-XR) 500 MG extended release tablet     Sig: Take 1 tablet by mouth 2 times daily     Dispense:  180 tablet     Refill:  2   I, Dr. Carolyn Milian, personally performed the services described in this documentation as scribed by TITO Lyn in my presence, and is both accurate and complete.

## 2020-09-21 RX ORDER — LISINOPRIL 2.5 MG/1
TABLET ORAL
Qty: 90 TABLET | Refills: 1 | Status: SHIPPED | OUTPATIENT
Start: 2020-09-21 | End: 2020-12-17 | Stop reason: SDUPTHER

## 2020-09-21 RX ORDER — METFORMIN HYDROCHLORIDE 500 MG/1
TABLET, EXTENDED RELEASE ORAL
Qty: 90 TABLET | Refills: 1 | Status: SHIPPED | OUTPATIENT
Start: 2020-09-21 | End: 2021-05-20

## 2020-09-24 ENCOUNTER — OFFICE VISIT (OUTPATIENT)
Dept: NEPHROLOGY | Age: 70
End: 2020-09-24
Payer: MEDICARE

## 2020-09-24 VITALS
BODY MASS INDEX: 33.11 KG/M2 | RESPIRATION RATE: 20 BRPM | HEIGHT: 65 IN | HEART RATE: 98 BPM | DIASTOLIC BLOOD PRESSURE: 76 MMHG | TEMPERATURE: 98.3 F | SYSTOLIC BLOOD PRESSURE: 117 MMHG | WEIGHT: 198.7 LBS

## 2020-09-24 PROBLEM — N18.30 CKD (CHRONIC KIDNEY DISEASE), STAGE III (HCC): Status: ACTIVE | Noted: 2020-09-24

## 2020-09-24 PROBLEM — R80.9 POSITIVE FOR MACROALBUMINURIA: Status: ACTIVE | Noted: 2020-09-24

## 2020-09-24 PROBLEM — N28.1 BILATERAL RENAL CYSTS: Status: ACTIVE | Noted: 2020-09-24

## 2020-09-24 PROCEDURE — 3017F COLORECTAL CA SCREEN DOC REV: CPT | Performed by: INTERNAL MEDICINE

## 2020-09-24 PROCEDURE — G8427 DOCREV CUR MEDS BY ELIG CLIN: HCPCS | Performed by: INTERNAL MEDICINE

## 2020-09-24 PROCEDURE — 4040F PNEUMOC VAC/ADMIN/RCVD: CPT | Performed by: INTERNAL MEDICINE

## 2020-09-24 PROCEDURE — 1036F TOBACCO NON-USER: CPT | Performed by: INTERNAL MEDICINE

## 2020-09-24 PROCEDURE — G8417 CALC BMI ABV UP PARAM F/U: HCPCS | Performed by: INTERNAL MEDICINE

## 2020-09-24 PROCEDURE — 99212 OFFICE O/P EST SF 10 MIN: CPT

## 2020-09-24 PROCEDURE — 99203 OFFICE O/P NEW LOW 30 MIN: CPT | Performed by: INTERNAL MEDICINE

## 2020-09-24 PROCEDURE — 1123F ACP DISCUSS/DSCN MKR DOCD: CPT | Performed by: INTERNAL MEDICINE

## 2020-09-24 NOTE — PATIENT INSTRUCTIONS
SURVEY:    You may be receiving a survey from [employer] regarding your visit today. Please complete the survey to enable us to provide the highest quality of care to you and your family. If you cannot score us a very good on any question, please call the office to discuss how we could have made your experience a very good one. Thank you. Patient Education        Learning About Coronavirus [phone])  Coronavirus [phone]): Overview  What is coronavirus (XSHPI-04)? The coronavirus disease (COVID-19) is caused by a virus. It is an illness that was first found in [place], [place], in December 2019. It has since spread worldwide. The virus can cause fever, cough, and trouble breathing. In severe cases, it can cause pneumonia and make it hard to breathe without help. It can cause death. Coronaviruses are a large group of viruses. They cause the common cold. They also cause more serious illnesses like Middle East respiratory syndrome (MERS) and severe acute respiratory syndrome (SARS). COVID-19 is caused by a novel coronavirus. That means it's a new type that has not been seen in people before. This virus spreads person-to-person through droplets from coughing and sneezing. It can also spread when you are close to someone who is infected. And it can spread when you touch something that has the virus on it, such as a doorknob or a tabletop. What can you do to protect yourself from coronavirus (COVID-19)? The best way to protect yourself from getting sick is to:  · Avoid areas where there is an outbreak. · Avoid contact with people who may be infected. · Wash your hands often with soap or alcohol-based hand sanitizers. · Avoid crowds and try to stay at least 6 feet away from other people. · Wash your hands often, especially after you cough or sneeze. Use soap and water, and scrub for at least 20 seconds. If soap and water aren't available, use an alcohol-based hand .   · Avoid touching your mouth, nose, and eyes. What can you do to avoid spreading the virus to others? To help avoid spreading the virus to others:  · Cover your mouth with a tissue when you cough or sneeze. Then throw the tissue in the trash. · Use a disinfectant to clean things that you touch often. · Wear a cloth face cover if you have to go to public areas. · Stay home if you are sick or have been exposed to the virus. Don't go to school, work, or public areas. And don't use public transportation, ride-shares, or taxis unless you have no choice. · If you are sick:  ? Leave your home only if you need to get medical care. But call the doctor's office first so they know you're coming. And wear a face cover. ? Wear the face cover whenever you're around other people. It can help stop the spread of the virus when you cough or sneeze. ? Clean and disinfect your home every day. Use household  and disinfectant wipes or sprays. Take special care to clean things that you grab with your hands. These include doorknobs, remote controls, phones, and handles on your refrigerator and microwave. And don't forget countertops, tabletops, bathrooms, and computer keyboards. When to call for help  [id number] anytime you think you may need emergency care. For example, call if:  · You have severe trouble breathing. (You can't talk at all.)  · You have constant chest pain or pressure. · You are severely dizzy or lightheaded. · You are confused or can't think clearly. · Your face and lips have a blue color. · You pass out (lose consciousness) or are very hard to wake up. Call your doctor now if you develop symptoms such as:  · Shortness of breath. · Fever. · Cough. If you need to get care, call ahead to the doctor's office for instructions before you go. Make sure you wear a face cover to prevent exposing other people to the virus. Where can you get the latest information?   The following health organizations are tracking and studying this virus. Their websites contain the most up-to-date information. Napoleon Apo also learn what to do if you think you may have been exposed to the virus. · U.S. Centers for Disease Control and Prevention (CDC): The CDC provides updated news about the disease and travel advice. The website also tells you how to prevent the spread of infection. www.cdc.gov  · World Health Organization [facility]): WHO offers information about the virus outbreaks. WHO also has travel advice. www.who.int  Current as of: May 8, 2020               Content Version: 12.5  © 2006-2020 [employer]. Care instructions adapted under license by [facility] ([facility]). If you have questions about a medical condition or this instruction, always ask your healthcare professional. [address] any warranty or liability for your use of this information. Patient Education        Learning About Coronavirus [phone])  Coronavirus [phone]): Overview  What is coronavirus (CEQSB-44)? The coronavirus disease (COVID-19) is caused by a virus. It is an illness that was first found in [place], [place], in December 2019. It has since spread worldwide. The virus can cause fever, cough, and trouble breathing. In severe cases, it can cause pneumonia and make it hard to breathe without help. It can cause death. Coronaviruses are a large group of viruses. They cause the common cold. They also cause more serious illnesses like Middle East respiratory syndrome (MERS) and severe acute respiratory syndrome (SARS). COVID-19 is caused by a novel coronavirus. That means it's a new type that has not been seen in people before. This virus spreads person-to-person through droplets from coughing and sneezing. It can also spread when you are close to someone who is infected. And it can spread when you touch something that has the virus on it, such as a doorknob or a tabletop. What can you do to protect yourself from coronavirus (COVID-19)?   The best way to protect yourself from getting sick is to:  · Avoid areas where there is an outbreak. · Avoid contact with people who may be infected. · Wash your hands often with soap or alcohol-based hand sanitizers. · Avoid crowds and try to stay at least 6 feet away from other people. · Wash your hands often, especially after you cough or sneeze. Use soap and water, and scrub for at least 20 seconds. If soap and water aren't available, use an alcohol-based hand . · Avoid touching your mouth, nose, and eyes. What can you do to avoid spreading the virus to others? To help avoid spreading the virus to others:  · Cover your mouth with a tissue when you cough or sneeze. Then throw the tissue in the trash. · Use a disinfectant to clean things that you touch often. · Wear a cloth face cover if you have to go to public areas. · Stay home if you are sick or have been exposed to the virus. Don't go to school, work, or public areas. And don't use public transportation, ride-shares, or taxis unless you have no choice. · If you are sick:  ? Leave your home only if you need to get medical care. But call the doctor's office first so they know you're coming. And wear a face cover. ? Wear the face cover whenever you're around other people. It can help stop the spread of the virus when you cough or sneeze. ? Clean and disinfect your home every day. Use household  and disinfectant wipes or sprays. Take special care to clean things that you grab with your hands. These include doorknobs, remote controls, phones, and handles on your refrigerator and microwave. And don't forget countertops, tabletops, bathrooms, and computer keyboards. When to call for help  [id number] anytime you think you may need emergency care. For example, call if:  · You have severe trouble breathing. (You can't talk at all.)  · You have constant chest pain or pressure. · You are severely dizzy or lightheaded.   · You are confused or can't think clearly. · Your face and lips have a blue color. · You pass out (lose consciousness) or are very hard to wake up. Call your doctor now if you develop symptoms such as:  · Shortness of breath. · Fever. · Cough. If you need to get care, call ahead to the doctor's office for instructions before you go. Make sure you wear a face cover to prevent exposing other people to the virus. Where can you get the latest information? The following health organizations are tracking and studying this virus. Their websites contain the most up-to-date information. Lisa Halie also learn what to do if you think you may have been exposed to the virus. · U.S. Centers for Disease Control and Prevention (CDC): The CDC provides updated news about the disease and travel advice. The website also tells you how to prevent the spread of infection. www.cdc.gov  · World Health Organization [facility]): WHO offers information about the virus outbreaks. WHO also has travel advice. www.who.int  Current as of: May 8, 2020               Content Version: 12.5  © 2006-2020 [employer]. Care instructions adapted under license by [facility] ([facility]). If you have questions about a medical condition or this instruction, always ask your healthcare professional. [address] any warranty or liability for your use of this information.

## 2020-09-24 NOTE — PROGRESS NOTES
[facility]  [facility]  [address] [facility] [address]  Dept: [phone]  Outpatient Consult  [phone]  9/24/2020 12:24 PM EDT        Pt Name:    Ema Sanchez  YOB: 1950  Primary Care Physician:  Emigdio Reeder MD     Chief Complaint:   Chief Complaint   Patient presents with    New Patient     Referral from PCP for \" He said I was losing some protein in my urine. \" Pt denies any flank area pain, nor edema of hands/ft/ankles. Background Information/Interval History:   The patient is a 65y.o. year old male referred by Dr. Natalya Anguiano for CKD III. He has never seen a nephrologist in the past.   Serum creatinine ranging 1.4-1.5 mg/dL. Previously 1.2-1.4 mg/dL. +macroalbuminuria 372 mg/g. History of DM (denies retinopathy or neuropathy, A1C 7.6), HTN, hyperlipidemia, GERD, gout. CT scan from 2014 showed B/L renal cysts with largest cyst 7.5 cm on the right. He has seen urology in the past ( 2012) for microscopic hematuria. Had normal cystourethroscopy in 2012. Denies hx of gross hematuria. Denies NSAID use. No flank pain. No edema. BP is controlled. + frothy urine. No family history of kidney disease. Denies hx of kidney stones. Former smoker. Allergies:  Patient has no known allergies.         Past Medical History:  Past Medical History:   Diagnosis Date    Benign hematuria     Diabetes mellitus ([facility] [address] 02/09/2013    A1C 7.2    Esophageal reflux 7/2001    Gastritis     GERD (gastroesophageal reflux disease)     Hematuria     Hyperglycemia 9/1999    Hyperlipidemia     Hypertension     Obesity, unspecified 2/2/2015    Other abnormal glucose 2/2/2015    Other and unspecified hyperlipidemia 7/1992    Renal cyst 1983    bilat    Syncope and collapse 2/2/2015    Type II or unspecified type diabetes mellitus without mention of complication, not stated as uncontrolled 2012    Unspecified essential of clubs or organizations: Not on file     Relationship status: Not on file    Intimate partner violence     Fear of current or ex partner: Not on file     Emotionally abused: Not on file     Physically abused: Not on file     Forced sexual activity: Not on file   Other Topics Concern    Not on file   Social History Narrative    Not on file        Review of Systems:  Constitutional: no fever or chills  Head: No headaches  Eyes: no blurry vision, no discharge  Ears: no ear pain or hearing changes  Nose: no runny nose or epistaxis  Respiratory: no shortness of breath or cough or sputum production  Cardiovascular: no chest pain, no edema  GI: no nausea, no vomiting or diarrhea  : denies any hematuria, no flank pain  Skin: no rash  Musculoskeletal: no joint pain, moves all ext  Neuro: no tremor, no slurred speech  Psychiatric: stable mood, no depression or insomnia     Home Medications:  Prior to Admission medications    Medication Sig Start Date End Date Taking? Authorizing Provider   lisinopril (PRINIVIL;ZESTRIL) 2.5 MG tablet TAKE ONE TABLET BY MOUTH DAILY 9/21/20  Yes Yazmin Willams MD   metFORMIN (GLUCOPHAGE-XR) 500 MG extended release tablet TAKE ONE TABLET BY MOUTH DAILY WITH BREAKFAST. Patient taking differently: Take 500 mg by mouth 2 times daily  9/21/20  Yes Yazmin Willams MD   allopurinol (ZYLOPRIM) 300 MG tablet TAKE ONE-HALF TABLET BY MOUTH DAILY 8/25/20  Yes Yazmin Willams MD   TRULICITY 1.5 CV/7.9KM SOPN INJECT 1.5 MG UNDER THE SKIN ONCE WEEKLY 5/26/20  Yes Yazmin Willams MD   CINNAMON PO Take 1 tablet by mouth 3 times daily    Yes Historical Provider, MD   LUTEIN-ZEAXANTHIN PO Take 1 tablet by mouth daily   Yes Historical Provider, MD   therapeutic multivitamin-minerals (THERAGRAN-M) tablet Take 1 tablet by mouth daily. Yes Historical Provider, MD   vitamin D (CHOLECALCIFEROL) 1000 UNIT TABS tablet Take 1,000 Units by mouth daily.      Yes Historical Provider, MD   Homeopathic Products (OSCILLOCOCCINUM PO) Take by mouth \"I take this in the fall. \"    Historical Provider, MD   Black Elderberry 50 MG/5ML SYRP Take by mouth \"I take this in the fall. \"    Historical Provider, MD        Physical Examination:  VITALS:  /76 (Site: Right Upper Arm, Position: Standing, Cuff Size: Medium Adult)   Pulse 98   Temp 98.3 °F (36.8 °C) (Tympanic)   Resp 20   Ht 5' 5\" (1.651 m)   Wt 198 lb 11.2 oz (90.1 kg)   BMI 33.07 kg/m²   Body mass index is 33.07 kg/m². Wt Readings from Last 3 Encounters:   09/24/20 198 lb 11.2 oz (90.1 kg)   08/25/20 201 lb 9.6 oz (91.4 kg)   03/12/20 202 lb 8 oz (91.9 kg)     Constitutional and General Appearance: alert and cooperative with exam, appears comfortable, no distress  Eyes: no icteric sclera, no pallor conjunctiva, no discharge seen from either eye  Ears and Nose: normal external appearance of left and right ear and nose. No active drainage from nose.    Oral: moist oral mucus membranes  Neck: No jugular venous distention, appears symmetric, good ROM  Lungs: Air entry B/L, no crackles or rales, no use of accessory muscles  Heart: regular rate, S1, S2  Extremities: no LE edema  GI: soft, non-tender, no guarding  Skin: no rash seen on exposed extremities  Musculo: moves all extremities  Neuro: no slurred speech, no facial drooping, no asterixis  Psychiatric: Awake, alert, Oriented     Laboratory & Diagnostics:  CBC:   Lab Results   Component Value Date    WBC 8.5 08/18/2020    HGB 16.1 08/18/2020    HCT 48.0 08/18/2020    MCV 92 08/18/2020     08/18/2020     BMP:    Lab Results   Component Value Date     08/18/2020     02/18/2020     08/20/2019    K 4.4 08/18/2020    K 4.0 02/18/2020    K 4.1 08/20/2019     08/18/2020     02/18/2020     08/20/2019    CO2 26 08/18/2020    CO2 24 02/18/2020    CO2 27 08/20/2019    BUN 22 08/18/2020    BUN 19 02/18/2020    BUN 20 08/20/2019    CREATININE 1.54 (H) 08/18/2020    CREATININE 1.47 (H) 02/18/2020    CREATININE 1.48 (H) 08/20/2019    GLUCOSE 147 (H) 08/18/2020    GLUCOSE 156 (H) 02/18/2020    GLUCOSE 153 (H) 08/20/2019      Hepatic:   Lab Results   Component Value Date    AST 29 08/18/2020    AST 36 02/18/2020    AST 28 08/20/2019    ALT 36 08/18/2020    ALT 59 (H) 02/18/2020    ALT 46 (H) 08/20/2019    BILITOT 0.8 08/17/2017    BILITOT 0.6 08/05/2016    BILITOT 0.37 09/27/2015    ALKPHOS 90 08/17/2017    ALKPHOS 81 08/05/2016    ALKPHOS 83 09/27/2015     BNP: No results found for: BNP  Lipids:   Lab Results   Component Value Date    CHOL 222 (H) 08/18/2020    HDL 47 08/18/2020     INR: No results found for: INR  URINE: No results found for: NAUR, PROTUR  Lab Results   Component Value Date    NITRU NEGATIVE 09/27/2015    COLORU YELLOW 09/27/2015    PHUR 5.5 09/27/2015    WBCUA None 09/27/2015    RBCUA 0 TO 2 09/27/2015    MUCUS NOT REPORTED 09/27/2015    TRICHOMONAS NOT REPORTED 09/27/2015    YEAST NOT REPORTED 09/27/2015    BACTERIA NOT REPORTED 09/27/2015    SPECGRAV >1.030 09/27/2015    LEUKOCYTESUR NEGATIVE 09/27/2015    UROBILINOGEN Normal 09/27/2015    BILIRUBINUR NEGATIVE 09/27/2015    BLOODU Positive 03/08/2012    GLUCOSEU NEGATIVE 09/27/2015    GLUCOSEU Negative 03/08/2012    KETUA NEGATIVE 09/27/2015    AMORPHOUS NOT REPORTED 09/27/2015      Microalbumen/Creatinine ratio:  No components found for: RUCREAT        Impression/Plan:   1. CKD IIIa with macroalbuminuria due to diabetic kidney disease. Continue Lisinopril, we may need to increase dose if worsens. Will obtain microscopic UA and renal US to evaluate known renal cysts. Hx of microscopic hematuria in past with negative urologic evaluation. Goals of care include slowing the rate of progression by controlling blood pressures, blood glucose, albuminuria, and avoiding nephrotoxic agents such as NSAIDs and IV contrast.   2. Macroalbuminuria from DM: continue ACE-I  3. HTN: controlled  4. DM  5. Gout on Allopurinol  6.  B/L renal cysts    Return in 3 months or sooner if the need arises. Thought process was discussed with the patient.   Thank you for the referral.  Please do not hesitate to contact me if you have any questions or concerns        Tomas Ryan, DO  [facility]

## 2020-10-08 ENCOUNTER — HOSPITAL ENCOUNTER (OUTPATIENT)
Dept: ULTRASOUND IMAGING | Age: 70
Discharge: HOME | End: 2020-10-10
Payer: MEDICARE

## 2020-10-08 PROCEDURE — 76775 US EXAM ABDO BACK WALL LIM: CPT

## 2020-10-09 ENCOUNTER — TELEPHONE (OUTPATIENT)
Dept: NEPHROLOGY | Age: 70
End: 2020-10-09

## 2020-12-07 LAB
BILIRUBIN, URINE: NEGATIVE
BLOOD, URINE: NEGATIVE
BUN BLDV-MCNC: 24 MG/DL
CALCIUM SERPL-MCNC: 9.2 MG/DL
CHLORIDE BLD-SCNC: 108 MMOL/L
CLARITY: CLEAR
CO2: 23 MMOL/L
COLOR: YELLOW
CREAT SERPL-MCNC: 1.45 MG/DL
CREATININE, URINE: 92.43
GFR CALCULATED: 48
GLUCOSE BLD-MCNC: 133 MG/DL
GLUCOSE URINE: NEGATIVE
KETONES, URINE: NEGATIVE
LEUKOCYTE ESTERASE, URINE: NEGATIVE
MICROALBUMIN/CREAT 24H UR: 39.3 MG/G{CREAT}
MICROALBUMIN/CREAT UR-RTO: 425.2
NITRITE, URINE: NEGATIVE
PH UA: 6 (ref 4.5–8)
POTASSIUM SERPL-SCNC: 3.9 MMOL/L
PROTEIN UA: NEGATIVE
PTH INTACT: 65
SODIUM BLD-SCNC: 140 MMOL/L
SPECIFIC GRAVITY, URINE: 1.02
UROBILINOGEN, URINE: NORMAL

## 2020-12-17 ENCOUNTER — OFFICE VISIT (OUTPATIENT)
Dept: NEPHROLOGY | Age: 70
End: 2020-12-17
Payer: MEDICARE

## 2020-12-17 VITALS
TEMPERATURE: 97.7 F | HEIGHT: 65 IN | WEIGHT: 197.8 LBS | HEART RATE: 92 BPM | RESPIRATION RATE: 20 BRPM | SYSTOLIC BLOOD PRESSURE: 120 MMHG | DIASTOLIC BLOOD PRESSURE: 72 MMHG | BODY MASS INDEX: 32.96 KG/M2

## 2020-12-17 PROCEDURE — G8484 FLU IMMUNIZE NO ADMIN: HCPCS | Performed by: INTERNAL MEDICINE

## 2020-12-17 PROCEDURE — G8417 CALC BMI ABV UP PARAM F/U: HCPCS | Performed by: INTERNAL MEDICINE

## 2020-12-17 PROCEDURE — 1123F ACP DISCUSS/DSCN MKR DOCD: CPT | Performed by: INTERNAL MEDICINE

## 2020-12-17 PROCEDURE — 99213 OFFICE O/P EST LOW 20 MIN: CPT | Performed by: INTERNAL MEDICINE

## 2020-12-17 PROCEDURE — 99213 OFFICE O/P EST LOW 20 MIN: CPT

## 2020-12-17 PROCEDURE — G8427 DOCREV CUR MEDS BY ELIG CLIN: HCPCS | Performed by: INTERNAL MEDICINE

## 2020-12-17 PROCEDURE — 1036F TOBACCO NON-USER: CPT | Performed by: INTERNAL MEDICINE

## 2020-12-17 PROCEDURE — 3017F COLORECTAL CA SCREEN DOC REV: CPT | Performed by: INTERNAL MEDICINE

## 2020-12-17 PROCEDURE — 4040F PNEUMOC VAC/ADMIN/RCVD: CPT | Performed by: INTERNAL MEDICINE

## 2020-12-17 RX ORDER — LISINOPRIL 5 MG/1
5 TABLET ORAL DAILY
Qty: 90 TABLET | Refills: 1 | Status: SHIPPED | OUTPATIENT
Start: 2020-12-17 | End: 2022-01-27 | Stop reason: SDUPTHER

## 2020-12-17 NOTE — PROGRESS NOTES
ADENOIDECTOMY      WRIST FRACTURE SURGERY Left     ORIF        VITALS:  /72 (Site: Right Upper Arm, Position: Standing, Cuff Size: Medium Adult)   Pulse 92   Temp 97.7 °F (36.5 °C) (Tympanic)   Resp 20   Ht 5' 5\" (1.651 m)   Wt 197 lb 12.8 oz (89.7 kg)   BMI 32.92 kg/m²   Wt Readings from Last 3 Encounters:   12/17/20 197 lb 12.8 oz (89.7 kg)   09/24/20 198 lb 11.2 oz (90.1 kg)   08/25/20 201 lb 9.6 oz (91.4 kg)     Body mass index is 32.92 kg/m². General Appearance: alert and cooperative with exam, appears comfortable, no distress  HEENT: EOMI, moist oral mucus membranes  Neck: No jugular venous distention,   Lungs: Air entry B/L, no crackles or rales, no use of accessory muscles  Heart: S1, S2 heard, no rub  GI: soft, non-tender, no guarding,   Extremities: No sig LE edema, no cyanosis  Skin: warm, dry  Neurologic: no tremor, no asterixis, no focal neurologic deficits     Medications:  Current Outpatient Medications   Medication Sig Dispense Refill    lisinopril (PRINIVIL;ZESTRIL) 2.5 MG tablet TAKE ONE TABLET BY MOUTH DAILY 90 tablet 1    metFORMIN (GLUCOPHAGE-XR) 500 MG extended release tablet TAKE ONE TABLET BY MOUTH DAILY WITH BREAKFAST. (Patient taking differently: Take 500 mg by mouth 2 times daily ) 90 tablet 1    allopurinol (ZYLOPRIM) 300 MG tablet TAKE ONE-HALF TABLET BY MOUTH DAILY 45 tablet 2    TRULICITY 1.5 QQ/5.2GK SOPN INJECT 1.5 MG UNDER THE SKIN ONCE WEEKLY 3 pen 2    Homeopathic Products (OSCILLOCOCCINUM PO) Take by mouth \"I take this in the fall. \"      CINNAMON PO Take 1 tablet by mouth 3 times daily       LUTEIN-ZEAXANTHIN PO Take 1 tablet by mouth daily      therapeutic multivitamin-minerals (THERAGRAN-M) tablet Take 1 tablet by mouth daily.  vitamin D (CHOLECALCIFEROL) 1000 UNIT TABS tablet Take 1,000 Units by mouth daily.  Black Elderberry 50 MG/5ML SYRP Take by mouth \"I take this in the fall. \"       No current facility-administered medications for this visit.          Laboratory & Diagnostics:  CBC:   Lab Results   Component Value Date    WBC 8.5 08/18/2020    HGB 16.1 08/18/2020    HCT 48.0 08/18/2020    MCV 92 08/18/2020     08/18/2020     BMP:    Lab Results   Component Value Date     12/07/2020     08/18/2020     02/18/2020    K 3.9 12/07/2020    K 4.4 08/18/2020    K 4.0 02/18/2020     12/07/2020     08/18/2020     02/18/2020    CO2 23 12/07/2020    CO2 26 08/18/2020    CO2 24 02/18/2020    BUN 24 12/07/2020    BUN 22 08/18/2020    BUN 19 02/18/2020    CREATININE 1.45 12/07/2020    CREATININE 1.54 (H) 08/18/2020    CREATININE 1.47 (H) 02/18/2020    GLUCOSE 133 12/07/2020    GLUCOSE 147 (H) 08/18/2020    GLUCOSE 156 (H) 02/18/2020      Hepatic:   Lab Results   Component Value Date    AST 29 08/18/2020    AST 36 02/18/2020    AST 28 08/20/2019    ALT 36 08/18/2020    ALT 59 (H) 02/18/2020    ALT 46 (H) 08/20/2019    BILITOT 0.8 08/17/2017    BILITOT 0.6 08/05/2016    BILITOT 0.37 09/27/2015    ALKPHOS 90 08/17/2017    ALKPHOS 81 08/05/2016    ALKPHOS 83 09/27/2015     BNP: No results found for: BNP  Lipids:   Lab Results   Component Value Date    CHOL 222 (H) 08/18/2020    HDL 47 08/18/2020     INR: No results found for: INR  URINE: No results found for: NAUR, PROTUR  Lab Results   Component Value Date    NITRU Negative 12/07/2020    COLORU Yellow 12/07/2020    PHUR 6.0 12/07/2020    WBCUA None 09/27/2015    RBCUA 0 TO 2 09/27/2015    MUCUS NOT REPORTED 09/27/2015    TRICHOMONAS NOT REPORTED 09/27/2015    YEAST NOT REPORTED 09/27/2015    BACTERIA NOT REPORTED 09/27/2015    CLARITYU Clear 12/07/2020    SPECGRAV >1.030 09/27/2015    LEUKOCYTESUR Negative 12/07/2020    UROBILINOGEN Normal 12/07/2020    BILIRUBINUR Negative 12/07/2020    BLOODU Negative 12/07/2020    GLUCOSEU negative 12/07/2020    GLUCOSEU Negative 03/08/2012    KETUA Negative 12/07/2020    AMORPHOUS NOT REPORTED 09/27/2015 Microalbumen/Creatinine ratio:  No components found for: RUCREAT        Impression/Plan:   1. 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 Goals of care include slowing rate of progression by controlling blood pressure, blood glucoses and albuminuria and by avoiding nephrotoxins such as NSAIDs and IV contrast.   2. Macroalbuminuria: increase Lisinopril to 5 mg daily. Discussed side effects of hypotension advised to call if any dizziness/lightheadedness. He will be repeating bloodwork with Dr. Belvin Councilman in Feb.  3. B/L renal cysts: stable compared to imaging in 2014. Will repeat an US next year  4. HTN; stable  5. DM  6. Gout    Bloodwork and medications were reviewed and plan of care discussed with the patient. Return to clinic in 6 months  or sooner if the need arises.       Zion Liang DO  [facility]

## 2020-12-17 NOTE — PATIENT INSTRUCTIONS
Increase Lisinopril to 5 mg daily. Call me if feeling dizzy or lightheaded. SURVEY:    You may be receiving a survey from [employer] regarding your visit today. Please complete the survey to enable us to provide the highest quality of care to you and your family. If you cannot score us a very good on any question, please call the office to discuss how we could have made your experience a very good one. Thank you. Patient Education        Learning About Coronavirus [phone])  Coronavirus [phone]): Overview  What is coronavirus ([id number])? The coronavirus disease (COVID-19) is caused by a virus. It is an illness that was first found in December 2019. It has since spread worldwide. The virus can cause fever, cough, and trouble breathing. In severe cases, it can cause pneumonia and make it hard to breathe without help. It can cause death. This virus spreads person-to-person through droplets from coughing and sneezing. It can also spread when you are close to someone who is infected. And it can spread when you touch something that has the virus on it, such as a doorknob or a tabletop. Coronaviruses are a large group of viruses. They cause the common cold. They also cause more serious illnesses like [place] East respiratory syndrome (MERS) and severe acute respiratory syndrome (SARS). COVID-19 is caused by a novel coronavirus. That means it's a new type that has not been seen in people before. How is COVID-19 treated? Mild illness can be treated at home, but more serious illness needs to be treated in the hospital. Treatment may include medicines to reduce symptoms, plus breathing support such as oxygen therapy or a ventilator. Other treatments, such as antiviral medicines, may help people who have COVID-19. What can you do to protect yourself from COVID-19? The best way to protect yourself from getting sick is to:  · Avoid areas where there is an outbreak. · Avoid contact with people who may be infected. · Avoid crowds and try to stay at least 6 feet away from other people. · Wash your hands often, especially after you cough or sneeze. Use soap and water, and scrub for at least 20 seconds. If soap and water aren't available, use an alcohol-based hand . · Avoid touching your mouth, nose, and eyes. What can you do to avoid spreading the virus to others? To help avoid spreading the virus to others:  · Wash your hands often with soap or alcohol-based hand sanitizers. · Cover your mouth with a tissue when you cough or sneeze. Then throw the tissue in the trash. · Use a disinfectant to clean things that you touch often. These include doorknobs, remote controls, phones, and handles on your refrigerator and microwave. And don't forget countertops, tabletops, bathrooms, and computer keyboards. · Wear a cloth face cover if you have to go to public areas. If you know or suspect that you have COVID-19:  · Stay home. Don't go to school, work, or public areas. And don't use public transportation, ride-shares, or taxis unless you have no choice. · Leave your home only if you need to get medical care or testing. But call the doctor's office first so they know you're coming. And wear a face cover. · Limit contact with people in your home. If possible, stay in a separate bedroom and use a separate bathroom. · Wear a face cover whenever you're around other people. It can help stop the spread of the virus when you cough or sneeze. · Clean and disinfect your home every day. Use household  and disinfectant wipes or sprays. Take special care to clean things that you grab with your hands. · Self-isolate until it's safe to be around others again. ? If you have symptoms, it's safe when you haven't had a fever for 3 days and your symptoms have improved and it's been at least 10 days since your symptoms started. ? If you were exposed to the virus but don't have symptoms, it's safe to be around others 14 days after exposure. ? Talk to your doctor about whether you also need testing, especially if you have a weakened immune system. When to call for help  Call 911 anytime you think you may need emergency care. For example, call if:  · You have severe trouble breathing. (You can't talk at all.)  · You have constant chest pain or pressure. · You are severely dizzy or lightheaded. · You are confused or can't think clearly. · Your face and lips have a blue color. · You passed out (lost consciousness) or are very hard to wake up. Call your doctor now if you develop symptoms such as:  · Shortness of breath. · Fever. · Cough. If you need to get care, call ahead to the doctor's office for instructions before you go. Make sure you wear a face cover to prevent exposing other people to the virus. Where can you get the latest information? The following health organizations are tracking and studying this virus. Their websites contain the most up-to-date information. Perry Zaidi also learn what to do if you think you may have been exposed to the virus. · U.S. Centers for Disease Control and Prevention (CDC): The CDC provides updated news about the disease and travel advice. The website also tells you how to prevent the spread of infection. www.cdc.gov  · World Health Organization [facility]): WHO offers information about the virus outbreaks. WHO also has travel advice. www.who.int  Current as of: July 10, 2020               Content Version: 12.6  © 2006-2020 [employer]. Care instructions adapted under license by [facility] ([facility]). If you have questions about a medical condition or this instruction, always ask your healthcare professional. [address] any warranty or liability for your use of this information.

## 2021-02-02 RX ORDER — DULAGLUTIDE 1.5 MG/.5ML
INJECTION, SOLUTION SUBCUTANEOUS
Qty: 12 PEN | Refills: 3 | Status: SHIPPED | OUTPATIENT
Start: 2021-02-02 | End: 2022-01-03

## 2021-02-18 LAB
ABSOLUTE BASO #: 0.1 X10E9/L (ref 0–0.2)
ABSOLUTE EOS #: 0.3 X10E9/L (ref 0–0.4)
ABSOLUTE LYMPH #: 2.3 X10E9/L (ref 1–3.5)
ABSOLUTE MONO #: 0.6 X10E9/L (ref 0–0.9)
ABSOLUTE NEUT #: 5.2 X10E9/L (ref 1.5–6.6)
ALT SERPL-CCNC: 40 U/L (ref 0–40)
ANION GAP SERPL CALCULATED.3IONS-SCNC: 11 MMOL/L (ref 5–15)
AST SERPL-CCNC: 31 U/L (ref 0–41)
AVERAGE GLUCOSE: 143 MG/DL
BASOPHILS RELATIVE PERCENT: 0.8 %
BUN BLDV-MCNC: 26 MG/DL (ref 5–27)
CALCIUM SERPL-MCNC: 9.3 MG/DL (ref 8.5–10.5)
CHLORIDE BLD-SCNC: 107 MMOL/L (ref 98–109)
CHOLESTEROL/HDL RATIO: 4.8 (ref 1–5)
CHOLESTEROL: 206 MG/DL (ref 150–200)
CO2: 23 MMOL/L (ref 22–32)
CREAT SERPL-MCNC: 1.57 MG/DL (ref 0.6–1.3)
EGFR AFRICAN AMERICAN: 53 ML/MIN/1.73SQ.M
EGFR IF NONAFRICAN AMERICAN: 44 ML/MIN/1.73SQ.M
EOSINOPHILS RELATIVE PERCENT: 3.3 %
GLUCOSE: 126 MG/DL (ref 65–99)
HBA1C MFR BLD: 6.6 % (ref 4.4–6.4)
HCT VFR BLD CALC: 45.9 % (ref 39–49)
HDLC SERPL-MCNC: 43 MG/DL
HEMOGLOBIN: 15.4 G/DL (ref 13–17)
HIGH SENSITIVE C-REACTIVE PROTEIN: 1.03 MG/DL (ref 0–0.74)
LDL CHOLESTEROL CALCULATED: 97 MG/DL
LDL/HDL RATIO: 2.3
LYMPHOCYTE %: 27.4 %
MCH RBC QN AUTO: 31.3 PG (ref 27–34)
MCHC RBC AUTO-ENTMCNC: 33.6 G/DL (ref 32–36)
MCV RBC AUTO: 93 FL (ref 80–100)
MONOCYTES # BLD: 7.1 %
NEUTROPHILS RELATIVE PERCENT: 61.4 %
PDW BLD-RTO: 14.5 % (ref 11.5–15)
PLATELETS: 202 X10E9/L (ref 150–450)
PMV BLD AUTO: 8.6 FL (ref 7–12)
POTASSIUM SERPL-SCNC: 4.2 MMOL/L (ref 3.5–5)
RBC: 4.93 X10E12/L (ref 4.1–5.7)
SODIUM BLD-SCNC: 141 MMOL/L (ref 134–146)
TRIGL SERPL-MCNC: 331 MG/DL (ref 27–150)
VLDLC SERPL CALC-MCNC: 66 MG/DL (ref 0–30)
WBC: 8.4 X10E9/L (ref 4–11)

## 2021-02-23 ENCOUNTER — OFFICE VISIT (OUTPATIENT)
Dept: FAMILY MEDICINE CLINIC | Age: 71
End: 2021-02-23
Payer: MEDICARE

## 2021-02-23 VITALS
SYSTOLIC BLOOD PRESSURE: 118 MMHG | HEIGHT: 65 IN | DIASTOLIC BLOOD PRESSURE: 80 MMHG | BODY MASS INDEX: 33.22 KG/M2 | WEIGHT: 199.4 LBS

## 2021-02-23 DIAGNOSIS — N18.31 CHRONIC RENAL FAILURE, STAGE 3A (HCC): ICD-10-CM

## 2021-02-23 DIAGNOSIS — I10 ESSENTIAL HYPERTENSION: ICD-10-CM

## 2021-02-23 DIAGNOSIS — E78.5 HYPERLIPIDEMIA, UNSPECIFIED HYPERLIPIDEMIA TYPE: ICD-10-CM

## 2021-02-23 DIAGNOSIS — E11.21 TYPE 2 DIABETES MELLITUS WITH DIABETIC NEPHROPATHY, WITHOUT LONG-TERM CURRENT USE OF INSULIN (HCC): ICD-10-CM

## 2021-02-23 DIAGNOSIS — Z00.00 ENCOUNTER FOR ANNUAL WELLNESS EXAM IN MEDICARE PATIENT: Primary | ICD-10-CM

## 2021-02-23 DIAGNOSIS — R55 VASODEPRESSOR SYNCOPE: ICD-10-CM

## 2021-02-23 PROCEDURE — 1123F ACP DISCUSS/DSCN MKR DOCD: CPT | Performed by: FAMILY MEDICINE

## 2021-02-23 PROCEDURE — G8484 FLU IMMUNIZE NO ADMIN: HCPCS | Performed by: FAMILY MEDICINE

## 2021-02-23 PROCEDURE — 99211 OFF/OP EST MAY X REQ PHY/QHP: CPT | Performed by: FAMILY MEDICINE

## 2021-02-23 PROCEDURE — G0439 PPPS, SUBSEQ VISIT: HCPCS | Performed by: FAMILY MEDICINE

## 2021-02-23 PROCEDURE — 4040F PNEUMOC VAC/ADMIN/RCVD: CPT | Performed by: FAMILY MEDICINE

## 2021-02-23 PROCEDURE — 3044F HG A1C LEVEL LT 7.0%: CPT | Performed by: FAMILY MEDICINE

## 2021-02-23 PROCEDURE — 99213 OFFICE O/P EST LOW 20 MIN: CPT | Performed by: FAMILY MEDICINE

## 2021-02-23 PROCEDURE — 3017F COLORECTAL CA SCREEN DOC REV: CPT | Performed by: FAMILY MEDICINE

## 2021-02-23 RX ORDER — ROSUVASTATIN CALCIUM 5 MG/1
5 TABLET, COATED ORAL DAILY
Qty: 90 TABLET | Refills: 1 | Status: SHIPPED | OUTPATIENT
Start: 2021-02-23 | End: 2021-11-29

## 2021-02-23 ASSESSMENT — PATIENT HEALTH QUESTIONNAIRE - PHQ9
SUM OF ALL RESPONSES TO PHQ QUESTIONS 1-9: 0
SUM OF ALL RESPONSES TO PHQ QUESTIONS 1-9: 0

## 2021-02-23 ASSESSMENT — LIFESTYLE VARIABLES
HOW OFTEN DO YOU HAVE A DRINK CONTAINING ALCOHOL: 1
AUDIT-C TOTAL SCORE: 1
HOW MANY STANDARD DRINKS CONTAINING ALCOHOL DO YOU HAVE ON A TYPICAL DAY: 0

## 2021-02-23 NOTE — PROGRESS NOTES
Bhavani Monk [facility] am scribing for and in the presence of Dr. Tawana Mckeon MD. 2/23/21/10:25/      Medicare Annual Wellness Visit  Name: Alek Drake Date: 2021   MRN: [id number] Sex: Male   Age: 65 y.o. Ethnicity: Non-/Non    : 1950 Race: Connor Thompson is here for Medicare AWV    HPI:  Sees Dr. Mitch Ryan for his kidneys    DIABETES MELLITUS TYPE II   Medication compliance: compliant most of the time  Diabetic diet compliance: compliant most of the time  Current exercise: none  Frequency of testing: none  Any episodes of hypoglycemia? no  Eye exam current (within one year): Yes, Dr. Bryson Manriquez,   Diabetic foot check in the past year    reports that he quit smoking about 5 years ago. His smoking use included Cigarettes, Pipe, and Cigars. He quit after 30.00 years of use. He has never used smokeless tobacco.      HYPERTENSION  He is not exercising and is adherent to a low-salt diet.  Blood pressure is not being monitored at home. Cardiac symptoms: none. Patient denies: chest pain, irregular heart beat and palpitations.     HYPERLIPIDEMIA    Medication compliance: compliant all of the time. Patient is  following a low fat, low cholesterol diet. He is gets his exersice from regular daily activities. Screenings for behavioral, psychosocial and functional/safety risks, and cognitive dysfunction are all negative except as indicated below. These results, as well as other patient data from the [address] form, are documented in Flowsheets linked to this Encounter. No Known Allergies      Prior to Visit Medications    Medication Sig Taking?  Authorizing Provider   rosuvastatin (CRESTOR) 5 MG tablet Take 1 tablet by mouth daily Yes MD JAIDEN Patino 1.5 AM/4.1GV SOPN INJECT 1.5 MG UNDER THE SKIN ONCE WEEKLY Yes Taawna Mckeon MD   lisinopril (PRINIVIL;ZESTRIL) 5 MG tablet Take 1 tablet by mouth daily Yes Paulina Quevedo DO   metFORMIN (GLUCOPHAGE-XR) 500 MG extended release tablet TAKE ONE TABLET BY MOUTH DAILY WITH BREAKFAST. Patient taking differently: Take 500 mg by mouth 2 times daily  Yes Kylie Ramírez MD   allopurinol (ZYLOPRIM) 300 MG tablet TAKE ONE-HALF TABLET BY MOUTH DAILY Yes Kylie Ramírez MD   Homeopathic Products (OSCILLOCOCCINUM PO) Take by mouth \"I take this in the fall. \" Yes Historical Provider, MD   Black Elderberry 50 MG/5ML SYRP Take by mouth \"I take this in the fall. \" Yes Historical Provider, MD   CINNAMON PO Take 1 tablet by mouth 3 times daily  Yes Historical Provider, MD   LUTEIN-ZEAXANTHIN PO Take 1 tablet by mouth daily Yes Historical Provider, MD   therapeutic multivitamin-minerals (THERAGRAN-M) tablet Take 1 tablet by mouth daily. Yes Historical Provider, MD   vitamin D (CHOLECALCIFEROL) 1000 UNIT TABS tablet Take 1,000 Units by mouth daily.    Yes Historical Provider, MD         Past Medical History:   Diagnosis Date    Benign hematuria     Diabetes mellitus ([facility] [address] 02/09/2013    A1C 7.2    Esophageal reflux 7/2001    Gastritis     GERD (gastroesophageal reflux disease)     Hematuria     Hyperglycemia 9/1999    Hyperlipidemia     Hypertension     Obesity, unspecified 2/2/2015    Other abnormal glucose 2/2/2015    Other and unspecified hyperlipidemia 7/1992    Renal cyst 1983    bilat    Syncope and collapse 2/2/2015    Type II or unspecified type diabetes mellitus without mention of complication, not stated as uncontrolled 2012    Unspecified essential hypertension 10/1991    borderline    Vasodepressor syncope 9/2006       Past Surgical History:   Procedure Laterality Date    APPENDECTOMY  1974    COLONOSCOPY  05/13/2019    -polyps(tubular adenomas)hemorrhoids    COLONOSCOPY N/A 5/13/2019    COLONOSCOPY POLYPECTOMY SNARE/COLD BIOPSY performed by Nellie Mcpherson MD at [address]    (-)    CYSTOSCOPY  03/2012    retrograde Dr. Xiomara Black: (-)    CYSTOURETHROSCOPY 4/3/00,6/13/89, 3/16/2012    TONSILLECTOMY AND ADENOIDECTOMY      WRIST FRACTURE SURGERY Left     ORIF         Family History   Problem Relation Age of Onset    Cancer Father         leukemia    Depression Sister     No Known Problems Mother        CareTeam (Including outside providers/suppliers regularly involved in providing care):   Patient Care Team:  Eddie Hooker MD as PCP - General  Eddie Hooker MD [facility] PCP [facility]    Wt Readings from Last 3 Encounters:   02/23/21 199 lb 6.4 oz (90.4 kg)   12/17/20 197 lb 12.8 oz (89.7 kg)   09/24/20 198 lb 11.2 oz (90.1 kg)     Vitals:    02/23/21 1005   BP: 118/80   Weight: 199 lb 6.4 oz (90.4 kg)   Height: 5' 5\" (1.651 m)     Body mass index is 33.18 kg/m². Based upon direct observation of the patient, evaluation of cognition reveals recent and remote memory intact. Patient's complete Health Risk Assessment and screening values have been reviewed and are found in Flowsheets. The following problems were reviewed today and where indicated follow up appointments were made and/or referrals ordered. Positive Risk Factor Screenings with Interventions:            General Health and ACP:  General  In general, how would you say your health is?: Good  In the past 7 days, have you experienced any of the following?  New or Increased Pain, New or Increased Fatigue, Loneliness, Social Isolation, Stress or Anger?: None of These  Do you get the social and emotional support that you need?: Yes  Do you have a Living Will?: Yes  Advance Directives     Power of [address] [place] [address] Will ACP-Advance Directive ACP-Power of     Not on File Not on File Not on File Not on File      General Health Risk Interventions:  ·     Health Habits/Nutrition:  Health Habits/Nutrition  Do you exercise for at least 20 minutes 2-3 times per week?: Yes  Have you lost any weight without trying in the past 3 months?: No  Do you eat only one meal per day?: No  Have you seen the dentist within the past year?: Yes  Body mass index: (!) 33.18  Health Habits/Nutrition Interventions:  ·        Personalized Preventive Plan   Current Health Maintenance Status  Immunization History   Administered Date(s) Administered    Influenza Vaccine, unspecified formulation 10/30/2016    Influenza Virus Vaccine 11/18/2017, 10/03/2018    Influenza Whole 10/27/2015    Pneumococcal Conjugate 13-valent ([id number]) 08/24/2018    Pneumococcal Polysaccharide ([id number]) 02/24/2017    Tdap (Boostrix, Adacel) 01/26/2019        Health Maintenance   Topic Date Due    Diabetic retinal exam  02/27/1958    Annual Wellness Visit (AWV)  05/29/2019    COVID-19 Vaccine (1 of 2) 08/23/2021 (Originally 2/27/1966)    Shingles Vaccine (1 of 2) 02/23/2022 (Originally 2/27/1998)    Hepatitis C screen  02/23/2022 (Originally 1950)    Diabetic foot exam  08/25/2021    A1C test (Diabetic or Prediabetic)  02/17/2022    Lipid screen  02/17/2022    Potassium monitoring  02/17/2022    Creatinine monitoring  02/17/2022    Colon cancer screen colonoscopy  05/13/2024    DTaP/Tdap/Td vaccine (2 - Td) 01/26/2029    Flu vaccine  Completed    Pneumococcal 65+ years Vaccine  Completed    AAA screen  Completed    Hepatitis A vaccine  Aged Out    Hib vaccine  Aged Out    Meningococcal (ACWY) vaccine  Aged Out     ROS:  General Constitutional: Denies chills. Denies fever. Denies headache. Denies lightheadedness. Ophthalmologic: Denies blurred vision. ENT: Denies nasal congestion. Denies sore throat. Denies ear pain and pressure. Respiratory: Denies cough. Denies shortness of breath. Denies wheezing. Cardiovascular: Denies chest pain at rest. Denies irregular heartbeat. Denies palpitations. Gastrointestinal: Denies abdominal pain. Denies blood in the stool. Denies constipation. Denies diarrhea. Denies nausea. Denies vomiting. Genitourinary: Denies blood in the urine. Denies difficulty urinating.  Denies frequent urination. Denies painful urination. Denies urinary incontinence  Musculoskeletal: Denies muscle aches. Denies painful joints. Denies swollen joints. Peripheral Vascular: Denies pain/cramping in legs after exertion. Skin: Denies dry skin. Denies itching. Denies rash. Neurologic: Denies falls. Denies dizziness. Denies fainting. Denies tingling/numbness. Psychiatric: Denies sleep disturbance. Denies anxiety. Denies depressed mood. PHYSICAL EXAM:  General Appearance: in no acute distress, well developed, well nourished. Eyes: pupils equal, round reactive to light and accommodation. Ears: normal canal and TM's. Nose: nares patent, no lesions. mucosola swelling without obstuction  Oral Cavity: mucosa moist.  Throat: clear. Detachment of uvula   Neck/Thyroid: neck supple, full range of motion, no cervical lymphadenopathy, no thyromegaly or carotid bruits. Skin: warm and dry. No suspicious lesions. Heart: regular rate and rhythm. No murmurs. S1, S2 normal, no gallops. Rate 70  Lungs: clear to auscultation bilaterally. Abdomen: bowel sounds present, soft, nontender, nondistended, no masses or organomegaly. Musculoskeletal: normal, full range of motion in knees and hips, no swelling or tenderness. Extremities: no cyanosis or edema. Peripheral Pulses: 2+ throughout, symetric. Neurologic: nonfocal, motor strength normal upper and lower extremities, sensory exam intact. Psych: normal affect, speech fluent. Plan:  We discuss his recent visits with the kidney specialist, all seems to be going good. We discussed the proteinuria and ace inhibitor    I review his labs his Triglycerides were elevated, I educate him this can be from diet and excess carbohydrates. I would like to see the triglycerides lowered before his next appt. His A1C has lowered to 6.6 which is very good considering last visit it was 7.6    I recommend he begin 5mg Rosuvastatin for his high cholesterol and triglyceride levels.  This can reduce the risk of heart attack, stroke. I will see him back in 6 months with labs prior       Recommendations for Preventive Services Due: see orders and patient instructions/AVS.  . Recommended screening schedule for the next 5-10 years is provided to the patient in written form: see Patient Instructions/AVS.    Gerard Murphy was seen today for medicare aw. Diagnoses and all orders for this visit:    Type 2 diabetes mellitus with diabetic nephropathy, without long-term current use of insulin ([facility] [address]  -     CRP,High Sensitivity; Future  -     Hemoglobin A1C; Future  -     ALT; Future  -     AST; Future  -     CBC; Future  -     Basic Metabolic Panel; Future  -     Lipid Panel; Future  -     rosuvastatin (CRESTOR) 5 MG tablet; Take 1 tablet by mouth daily    Essential hypertension  -     CRP,High Sensitivity; Future  -     Hemoglobin A1C; Future  -     ALT; Future  -     AST; Future  -     CBC; Future  -     Basic Metabolic Panel; Future  -     Lipid Panel; Future  -     rosuvastatin (CRESTOR) 5 MG tablet; Take 1 tablet by mouth daily    Hyperlipidemia, unspecified hyperlipidemia type  -     CRP,High Sensitivity; Future  -     Hemoglobin A1C; Future  -     ALT; Future  -     AST; Future  -     CBC; Future  -     Basic Metabolic Panel; Future  -     Lipid Panel; Future  -     rosuvastatin (CRESTOR) 5 MG tablet; Take 1 tablet by mouth daily    Chronic renal failure, stage 3a    Vasodepressor syncope             I, Dr. Eddie Hooker, personally performed the services described in this documentation as scribed by LUCIA Hightower in my presence, and is both accurate and complete.

## 2021-02-23 NOTE — PATIENT INSTRUCTIONS
SURVEY:    You may be receiving a survey from [employer] regarding your visit today. Please complete the survey to enable us to provide the highest quality of care to you and your family. If you cannot score us a very good on any question, please call the office to discuss how we could of made your experience a very good one. Thank you. Plan:  We discuss his recent visits with the kidney specialist, all seems to be going good. I review his labs his Triglycerides were elevated, I educate him this can be from diet and excess carbohydrates. I would like to see the triglycerides lowered before his next appt. His A1C has lowered to 6.6 which is very good considering last visit it was 7.6    I recommend he begin 5mg Rosuvastatin for his high cholesterol and triglyceride levels. This can reduce the risk of heart attack, stroke.       I will see him back in 6 months with labs prior

## 2021-05-20 DIAGNOSIS — E11.21 TYPE 2 DIABETES MELLITUS WITH DIABETIC NEPHROPATHY, WITHOUT LONG-TERM CURRENT USE OF INSULIN (HCC): ICD-10-CM

## 2021-05-20 RX ORDER — METFORMIN HYDROCHLORIDE 500 MG/1
TABLET, EXTENDED RELEASE ORAL
Qty: 180 TABLET | Refills: 1 | Status: SHIPPED | OUTPATIENT
Start: 2021-05-20 | End: 2021-11-19

## 2021-05-20 RX ORDER — ALLOPURINOL 300 MG/1
TABLET ORAL
Qty: 45 TABLET | Refills: 1 | Status: SHIPPED | OUTPATIENT
Start: 2021-05-20 | End: 2021-11-19

## 2021-05-20 NOTE — TELEPHONE ENCOUNTER
Health Maintenance   Topic Date Due    Diabetic retinal exam  Never done    COVID-19 Vaccine (1) 08/23/2021 (Originally 2/27/1962)    Shingles Vaccine (1 of 2) 02/23/2022 (Originally 2/27/1998)    Hepatitis C screen  02/23/2022 (Originally 1950)    Diabetic foot exam  08/25/2021    Diabetic microalbuminuria test  12/07/2021    A1C test (Diabetic or Prediabetic)  02/17/2022    Lipid screen  02/17/2022    Potassium monitoring  02/17/2022    Creatinine monitoring  02/17/2022    Annual Wellness Visit (AWV)  02/24/2022    Colon cancer screen colonoscopy  05/13/2024    DTaP/Tdap/Td vaccine (2 - Td) 01/26/2029    Flu vaccine  Completed    Pneumococcal 65+ years Vaccine  Completed    AAA screen  Completed    Hepatitis A vaccine  Aged Out    Hib vaccine  Aged Out    Meningococcal (ACWY) vaccine  Aged Out             (applicable per patient's age: Cancer Screenings, Depression Screening, Fall Risk Screening, Immunizations)    Hemoglobin A1C (%)   Date Value   02/17/2021 6.6 (H)   08/18/2020 7.6 (H)   02/18/2020 7.9 (H)     LDL Calculated (mg/dL)   Date Value   02/17/2021 97     AST (U/L)   Date Value   02/17/2021 31     ALT (U/L)   Date Value   02/17/2021 40     BUN (mg/dL)   Date Value   02/17/2021 26      (goal A1C is < 7)   (goal LDL is <100) need 30-50% reduction from baseline     BP Readings from Last 3 Encounters:   02/23/21 118/80   12/17/20 120/72   09/24/20 117/76    (goal /80)      All Future Testing planned in [facility]:  Lab Frequency Next Occurrence   Basic Metabolic Panel Once 85/47/6259   Microalbumin / Creatinine Urine Ratio Once 06/17/2021   CRP,High Sensitivity Once 08/23/2021   Hemoglobin A1C Once 08/23/2021   ALT Once 08/23/2021   AST Once 08/23/2021   CBC Once 64/48/8613   Basic Metabolic Panel Once 53/25/4488   Lipid Panel Once 08/23/2021       Next Visit Date:  Future Appointments   Date Time Provider Marisol Fu   6/24/2021 11:00 AM Butch Leyden E Hemmelgarn, DO [facility]   8/25/2021  1:45 PM Aime Maurice MD [facility]            Patient Active Problem List:     Hematuria     Esophageal reflux     Essential hypertension     Hyperlipidemia     Obesity     Lung nodule     Proteinuria     MTP instability     Acute gout involving toe of left foot     Uncontrolled diabetes mellitus type 2 without complications     Type 2 diabetes mellitus without complication (HCC)     Need for prophylactic vaccination against Streptococcus pneumoniae (pneumococcus)     Orthostasis     Vasodepressor syncope     Positive FIT (fecal immunochemical test)     Chronic renal failure, stage 3 (moderate)     Impacted cerumen of right ear     Idiopathic chronic gout of multiple sites without tophus     Bilateral renal cysts     CKD (chronic kidney disease), stage III     Positive for macroalbuminuria

## 2021-06-19 LAB
BUN BLDV-MCNC: 29 MG/DL
CALCIUM SERPL-MCNC: 9.2 MG/DL
CHLORIDE BLD-SCNC: 103 MMOL/L
CO2: 21 MMOL/L
CREAT SERPL-MCNC: 1.69 MG/DL
CREATININE, URINE: 95.01
GFR CALCULATED: 40
GLUCOSE BLD-MCNC: 341 MG/DL
MICROALBUMIN/CREAT 24H UR: 16.1 MG/G{CREAT}
MICROALBUMIN/CREAT UR-RTO: 169.5
POTASSIUM SERPL-SCNC: 4 MMOL/L
SODIUM BLD-SCNC: 136 MMOL/L

## 2021-06-24 ENCOUNTER — OFFICE VISIT (OUTPATIENT)
Dept: NEPHROLOGY | Age: 71
End: 2021-06-24
Payer: MEDICARE

## 2021-06-24 VITALS
HEART RATE: 95 BPM | RESPIRATION RATE: 20 BRPM | HEIGHT: 65 IN | WEIGHT: 196.2 LBS | DIASTOLIC BLOOD PRESSURE: 87 MMHG | TEMPERATURE: 98.2 F | BODY MASS INDEX: 32.69 KG/M2 | SYSTOLIC BLOOD PRESSURE: 139 MMHG

## 2021-06-24 DIAGNOSIS — N28.1 BILATERAL RENAL CYSTS: ICD-10-CM

## 2021-06-24 DIAGNOSIS — R80.9 POSITIVE FOR MACROALBUMINURIA: ICD-10-CM

## 2021-06-24 DIAGNOSIS — N18.32 STAGE 3B CHRONIC KIDNEY DISEASE (HCC): Primary | ICD-10-CM

## 2021-06-24 PROCEDURE — 1123F ACP DISCUSS/DSCN MKR DOCD: CPT | Performed by: INTERNAL MEDICINE

## 2021-06-24 PROCEDURE — 3017F COLORECTAL CA SCREEN DOC REV: CPT | Performed by: INTERNAL MEDICINE

## 2021-06-24 PROCEDURE — G8427 DOCREV CUR MEDS BY ELIG CLIN: HCPCS | Performed by: INTERNAL MEDICINE

## 2021-06-24 PROCEDURE — 99212 OFFICE O/P EST SF 10 MIN: CPT | Performed by: INTERNAL MEDICINE

## 2021-06-24 PROCEDURE — 4040F PNEUMOC VAC/ADMIN/RCVD: CPT | Performed by: INTERNAL MEDICINE

## 2021-06-24 PROCEDURE — G8417 CALC BMI ABV UP PARAM F/U: HCPCS | Performed by: INTERNAL MEDICINE

## 2021-06-24 PROCEDURE — 99213 OFFICE O/P EST LOW 20 MIN: CPT | Performed by: INTERNAL MEDICINE

## 2021-06-24 PROCEDURE — 1036F TOBACCO NON-USER: CPT | Performed by: INTERNAL MEDICINE

## 2021-06-24 NOTE — PATIENT INSTRUCTIONS
SURVEY:    You may be receiving a survey from [employer] regarding your visit today. Please complete the survey to enable us to provide the highest quality of care to you and your family. If you cannot score us a very good on any question, please call the office to discuss how we could have made your experience a very good one. Thank you.

## 2021-06-24 NOTE — PROGRESS NOTES
[facility]  [facility]  [address] [place] [facility] [address]  Dept: [phone]  Progress Note  6/24/2021 10:41 AM      Pt Name:    Filiberto Correa  YOB: 1950  Primary Care Physician:  Roland Thompson MD     Chief Complaint:   Chief Complaint   Patient presents with    Chronic Kidney Disease     stage 3         History of Present Illness: This is a follow-up visit for CKD III. Hx of DM, HTN, hyperlipidemia, GERD, gout, renal cysts. Patient states he was at [facility] ED earlier in the month after a syncopal episode. Reports his BP was in the 62T systolic. He got some IV fluids. States he was dehydrated. Feels well since then. BP today is good. Denies any dizziness at home. Not checking Bps at home. Pertinent items are noted in HPI.          Past History:  Past Medical History:   Diagnosis Date    Benign hematuria     Diabetes mellitus ([facility] [address] 02/09/2013    A1C 7.2    Esophageal reflux 7/2001    Gastritis     GERD (gastroesophageal reflux disease)     Hematuria     Hyperglycemia 9/1999    Hyperlipidemia     Hypertension     Obesity, unspecified 2/2/2015    Other abnormal glucose 2/2/2015    Other and unspecified hyperlipidemia 7/1992    Renal cyst 1983    bilat    Syncope and collapse 2/2/2015    Type II or unspecified type diabetes mellitus without mention of complication, not stated as uncontrolled 2012    Unspecified essential hypertension 10/1991    borderline    Vasodepressor syncope 9/2006     Past Surgical History:   Procedure Laterality Date    APPENDECTOMY  1974    COLONOSCOPY  05/13/2019    -polyps(tubular adenomas)hemorrhoids    COLONOSCOPY N/A 5/13/2019    COLONOSCOPY POLYPECTOMY SNARE/COLD BIOPSY performed by Cristy Ordonez MD at [facility] [address]    (-)    CYSTOSCOPY  03/2012    retrograde [facility]: (-)    CYSTOURETHROSCOPY  4/3/00,6/13/89, 3/16/2012    TONSILLECTOMY AND ADENOIDECTOMY      WRIST FRACTURE SURGERY Left     ORIF        VITALS:  /87 (Site: Right Lower Arm, Position: Standing, Cuff Size: Medium Adult)   Pulse 95   Temp 98.2 °F (36.8 °C) (Tympanic)   Resp 20   Ht 5' 5\" (1.651 m)   Wt 196 lb 3.2 oz (89 kg)   BMI 32.65 kg/m²   Wt Readings from Last 3 Encounters:   06/24/21 196 lb 3.2 oz (89 kg)   02/23/21 199 lb 6.4 oz (90.4 kg)   12/17/20 197 lb 12.8 oz (89.7 kg)     Body mass index is 32.65 kg/m². General Appearance: alert and cooperative with exam, appears comfortable, no distress  HEENT: EOMI, moist oral mucus membranes  Neck: No jugular venous distention,   Lungs: Air entry B/L, no crackles or rales, no use of accessory muscles  Heart: S1, S2 heard, no rub  GI: soft, non-tender, no guarding,   Extremities: No sig LE edema, no cyanosis  Skin: warm, dry  Neurologic: no tremor, no asterixis, no focal neurologic deficits     Medications:  Current Outpatient Medications   Medication Sig Dispense Refill    metFORMIN (GLUCOPHAGE-XR) 500 MG extended release tablet TAKE ONE TABLET BY MOUTH TWICE A  tablet 1    allopurinol (ZYLOPRIM) 300 MG tablet TAKE 1/2 TABLET BY MOUTH DAILY 45 tablet 1    rosuvastatin (CRESTOR) 5 MG tablet Take 1 tablet by mouth daily 90 tablet 1    TRULICITY 1.5 FU/6.7PI SOPN INJECT 1.5 MG UNDER THE SKIN ONCE WEEKLY 12 pen 3    Homeopathic Products (OSCILLOCOCCINUM PO) Take by mouth \"I take this in the fall. \"     Verdene Peace 50 MG/5ML SYRP Take by mouth \"I take this in the fall. \"      CINNAMON PO Take 1 tablet by mouth 3 times daily       LUTEIN-ZEAXANTHIN PO Take 1 tablet by mouth daily      therapeutic multivitamin-minerals (THERAGRAN-M) tablet Take 1 tablet by mouth daily.  vitamin D (CHOLECALCIFEROL) 1000 UNIT TABS tablet Take 1,000 Units by mouth daily.         lisinopril (PRINIVIL;ZESTRIL) 5 MG tablet Take 1 tablet by mouth daily 90 tablet 1     No current facility-administered medications for this visit.         Laboratory & Diagnostics:  CBC:   Lab Results   Component Value Date    WBC 8.4 02/17/2021    HGB 15.4 02/17/2021    HCT 45.9 02/17/2021    MCV 93 02/17/2021     02/17/2021     BMP:    Lab Results   Component Value Date     06/19/2021     02/17/2021     12/07/2020    K 4.0 06/19/2021    K 4.2 02/17/2021    K 3.9 12/07/2020     06/19/2021     02/17/2021     12/07/2020    CO2 21 06/19/2021    CO2 23 02/17/2021    CO2 23 12/07/2020    BUN 29 06/19/2021    BUN 26 02/17/2021    BUN 24 12/07/2020    CREATININE 1.69 06/19/2021    CREATININE 1.57 (H) 02/17/2021    CREATININE 1.45 12/07/2020    GLUCOSE 341 06/19/2021    GLUCOSE 126 (H) 02/17/2021    GLUCOSE 133 12/07/2020      Hepatic:   Lab Results   Component Value Date    AST 31 02/17/2021    AST 29 08/18/2020    AST 36 02/18/2020    ALT 40 02/17/2021    ALT 36 08/18/2020    ALT 59 (H) 02/18/2020    BILITOT 0.8 08/17/2017    BILITOT 0.6 08/05/2016    BILITOT 0.37 09/27/2015    ALKPHOS 90 08/17/2017    ALKPHOS 81 08/05/2016    ALKPHOS 83 09/27/2015     BNP: No results found for: BNP  Lipids:   Lab Results   Component Value Date    CHOL 206 (H) 02/17/2021    HDL 43 02/17/2021     INR: No results found for: INR  URINE: No results found for: NAUR, PROTUR  Lab Results   Component Value Date    NITRU Negative 12/07/2020    COLORU Yellow 12/07/2020    PHUR 6.0 12/07/2020    WBCUA None 09/27/2015    RBCUA 0 TO 2 09/27/2015    MUCUS NOT REPORTED 09/27/2015    TRICHOMONAS NOT REPORTED 09/27/2015    YEAST NOT REPORTED 09/27/2015    BACTERIA NOT REPORTED 09/27/2015    CLARITYU Clear 12/07/2020    SPECGRAV >1.030 09/27/2015    LEUKOCYTESUR Negative 12/07/2020    UROBILINOGEN Normal 12/07/2020    BILIRUBINUR Negative 12/07/2020    BLOODU Negative 12/07/2020    GLUCOSEU negative 12/07/2020    GLUCOSEU Negative 03/08/2012    KETUA Negative 12/07/2020    AMORPHOUS NOT REPORTED 09/27/2015 Microalbumen/Creatinine ratio:  No components found for: RUCREAT        Impression/Plan:   1. CKD IIIb due to diabetic kidney disease, slightly worse. Recent ED visit for dehydration. Increase water intale. Goals of care include slowing rate of progression by controlling blood pressure, blood glucoses and albuminuria and by avoiding nephrotoxins such as NSAIDs and IV contrast.   2. Macroalbuminuria:better on lisinopril 5 mg daily  3. B/L renal cysts: stable compared to imaging in 2014  4. HTN; stable  5. DM  6. Gout    Bloodwork and medications were reviewed and plan of care discussed with the patient. Return to clinic in 6 months  or sooner if the need arises.       Maryan Res, DO  [facility]

## 2021-08-18 LAB
ABSOLUTE BASO #: 0.1 X10E9/L (ref 0–0.2)
ABSOLUTE EOS #: 0.3 X10E9/L (ref 0–0.4)
ABSOLUTE LYMPH #: 2.5 X10E9/L (ref 1–3.5)
ABSOLUTE MONO #: 0.5 X10E9/L (ref 0–0.9)
ABSOLUTE NEUT #: 4.5 X10E9/L (ref 1.5–6.6)
ALT SERPL-CCNC: 47 U/L (ref 0–40)
ANION GAP SERPL CALCULATED.3IONS-SCNC: 9 MMOL/L (ref 5–15)
AST SERPL-CCNC: 30 U/L (ref 0–41)
AVERAGE GLUCOSE: 163 MG/DL
BASOPHILS RELATIVE PERCENT: 1.2 %
BUN BLDV-MCNC: 29 MG/DL (ref 5–27)
CALCIUM SERPL-MCNC: 9.4 MG/DL (ref 8.5–10.5)
CHLORIDE BLD-SCNC: 109 MMOL/L (ref 98–109)
CHOLESTEROL/HDL RATIO: 3.3 (ref 1–5)
CHOLESTEROL: 149 MG/DL (ref 150–200)
CO2: 23 MMOL/L (ref 22–32)
CREAT SERPL-MCNC: 1.48 MG/DL (ref 0.6–1.3)
EGFR AFRICAN AMERICAN: 56 ML/MIN/1.73SQ.M
EGFR IF NONAFRICAN AMERICAN: 47 ML/MIN/1.73SQ.M
EOSINOPHILS RELATIVE PERCENT: 4.1 %
GLUCOSE: 138 MG/DL (ref 65–99)
HBA1C MFR BLD: 7.3 % (ref 4.4–6.4)
HCT VFR BLD CALC: 43.4 % (ref 39–49)
HDLC SERPL-MCNC: 45 MG/DL
HEMOGLOBIN: 14.8 G/DL (ref 13–17)
HIGH SENSITIVE C-REACTIVE PROTEIN: 0.37 MG/DL (ref 0–0.74)
LDL CHOLESTEROL CALCULATED: 53 MG/DL
LDL/HDL RATIO: 1.2
LYMPHOCYTE %: 31.5 %
MCH RBC QN AUTO: 31.8 PG (ref 27–34)
MCHC RBC AUTO-ENTMCNC: 34.1 G/DL (ref 32–36)
MCV RBC AUTO: 93 FL (ref 80–100)
MONOCYTES # BLD: 6.1 %
NEUTROPHILS RELATIVE PERCENT: 57.1 %
PDW BLD-RTO: 14 % (ref 11.5–15)
PLATELETS: 175 X10E9/L (ref 150–450)
PMV BLD AUTO: 8.7 FL (ref 7–12)
POTASSIUM SERPL-SCNC: 4.3 MMOL/L (ref 3.5–5)
RBC: 4.66 X10E12/L (ref 4.1–5.7)
SODIUM BLD-SCNC: 141 MMOL/L (ref 134–146)
TRIGL SERPL-MCNC: 257 MG/DL (ref 27–150)
VLDLC SERPL CALC-MCNC: 51 MG/DL (ref 0–30)
WBC: 7.8 X10E9/L (ref 4–11)

## 2021-08-25 ENCOUNTER — OFFICE VISIT (OUTPATIENT)
Dept: FAMILY MEDICINE CLINIC | Age: 71
End: 2021-08-25
Payer: MEDICARE

## 2021-08-25 VITALS — BODY MASS INDEX: 32.65 KG/M2 | HEIGHT: 65 IN

## 2021-08-25 DIAGNOSIS — R55 VASODEPRESSOR SYNCOPE: Primary | ICD-10-CM

## 2021-08-25 DIAGNOSIS — E78.5 HYPERLIPIDEMIA, UNSPECIFIED HYPERLIPIDEMIA TYPE: ICD-10-CM

## 2021-08-25 DIAGNOSIS — I10 ESSENTIAL HYPERTENSION: ICD-10-CM

## 2021-08-25 DIAGNOSIS — N18.31 CHRONIC RENAL FAILURE, STAGE 3A (HCC): ICD-10-CM

## 2021-08-25 DIAGNOSIS — E11.21 TYPE 2 DIABETES MELLITUS WITH DIABETIC NEPHROPATHY, WITHOUT LONG-TERM CURRENT USE OF INSULIN (HCC): ICD-10-CM

## 2021-08-25 PROCEDURE — 3017F COLORECTAL CA SCREEN DOC REV: CPT | Performed by: FAMILY MEDICINE

## 2021-08-25 PROCEDURE — G8417 CALC BMI ABV UP PARAM F/U: HCPCS | Performed by: FAMILY MEDICINE

## 2021-08-25 PROCEDURE — 4040F PNEUMOC VAC/ADMIN/RCVD: CPT | Performed by: FAMILY MEDICINE

## 2021-08-25 PROCEDURE — 1036F TOBACCO NON-USER: CPT | Performed by: FAMILY MEDICINE

## 2021-08-25 PROCEDURE — 99211 OFF/OP EST MAY X REQ PHY/QHP: CPT | Performed by: FAMILY MEDICINE

## 2021-08-25 PROCEDURE — 99214 OFFICE O/P EST MOD 30 MIN: CPT | Performed by: FAMILY MEDICINE

## 2021-08-25 PROCEDURE — 1123F ACP DISCUSS/DSCN MKR DOCD: CPT | Performed by: FAMILY MEDICINE

## 2021-08-25 PROCEDURE — 2022F DILAT RTA XM EVC RTNOPTHY: CPT | Performed by: FAMILY MEDICINE

## 2021-08-25 PROCEDURE — G8427 DOCREV CUR MEDS BY ELIG CLIN: HCPCS | Performed by: FAMILY MEDICINE

## 2021-08-25 PROCEDURE — 3051F HG A1C>EQUAL 7.0%<8.0%: CPT | Performed by: FAMILY MEDICINE

## 2021-08-25 NOTE — PROGRESS NOTES
Mitzie Canavan, RMA, am scribing for and in the presence of Dr. Carmen Julio. 8/25/21/2:15pm/SNP    [address]  [place] [id number]  Dept: [phone]    Jm Burns is a 66 y.o. male here for 6 Month Follow-Up, Diabetes, Hypertension, and Hyperlipidemia    HPI:  DIABETES MELLITUS TYPE II   Medication compliance: compliant most of the time  Diabetic diet compliance: compliant most of the time  Current exercise: none  Frequency of testing: none  Any episodes of hypoglycemia? no  Eye exam current (within one year): Yes, Dr. Fuller Favorite  Diabetic foot check in the past year: no    reports that he quit smoking about 5 years ago. His smoking use included Cigarettes, Pipe, and Cigars. He quit after 30.00 years of use. He has never used smokeless tobacco.      HYPERTENSION  He is not exercising and is adherent to a low-salt diet.  Blood pressure is not being monitored at home. Cardiac symptoms: none. Patient denies: chest pain, irregular heart beat and palpitations.     HYPERLIPIDEMIA    Medication compliance: compliant all of the time. Patient is  following a low fat, low cholesterol diet. He is gets his exersice from regular daily activities. Prior to Admission medications    Medication Sig Start Date End Date Taking? Authorizing Provider   metFORMIN (GLUCOPHAGE-XR) 500 MG extended release tablet TAKE ONE TABLET BY MOUTH TWICE A DAY 5/20/21  Yes Carmen Julio MD   allopurinol (ZYLOPRIM) 300 MG tablet TAKE 1/2 TABLET BY MOUTH DAILY 5/20/21  Yes Carmen Julio MD   rosuvastatin (CRESTOR) 5 MG tablet Take 1 tablet by mouth daily 2/23/21  Yes Carmen Julio MD   TRULICITY 1.5 NM/0.5VJ SOPN INJECT 1.5 MG UNDER THE SKIN ONCE WEEKLY 2/2/21  Yes Carmen Julio MD   Homeopathic Products (OSCILLOCOCCINUM PO) Take by mouth \"I take this in the fall. \"   Yes Historical Provider, MD   Black Elderberry 50 MG/5ML SYRP Take by mouth \"I take this in the fall. \"   Yes Historical Provider, MD   CINNAMON PO Take 1 tablet by mouth 3 times daily    Yes Historical Provider, MD   LUTEIN-ZEAXANTHIN PO Take 1 tablet by mouth daily   Yes Historical Provider, MD   therapeutic multivitamin-minerals (THERAGRAN-M) tablet Take 1 tablet by mouth daily. Yes Historical Provider, MD   vitamin D (CHOLECALCIFEROL) 1000 UNIT TABS tablet Take 1,000 Units by mouth daily. Yes Historical Provider, MD   lisinopril (PRINIVIL;ZESTRIL) 5 MG tablet Take 1 tablet by mouth daily 12/17/20 3/17/21  Juanis Galvez, DO     ROS:  General Constitutional: Denies chills. Denies fever. Denies headache. Denies lightheadedness. Ophthalmologic: Denies blurred vision. ENT: Denies nasal congestion. Denies sore throat. Denies ear pain and pressure. Respiratory: Denies cough. Denies shortness of breath. Denies wheezing. Cardiovascular: Denies chest pain at rest. Denies irregular heartbeat. Denies palpitations. Gastrointestinal: Denies abdominal pain. Denies blood in the stool. Denies constipation. Denies diarrhea. Denies nausea. Denies vomiting. Genitourinary: Denies blood in the urine. Denies difficulty urinating. Denies frequent urination. Denies painful urination. Denies urinary incontinence. Musculoskeletal: Denies muscle aches. Denies painful joints. Denies swollen joints. Peripheral Vascular: Denies pain/cramping in legs after exertion. Skin: Denies dry skin. Denies itching. Denies rash. Neurologic: Denies falls. Denies dizziness. Denies tingling/numbness. Admits syncopal episode in July, He was standing up eating and started to feel 'funny' and passed out while eating a piece of blueberry cheesecake following a hamburger. It was a hot day and he did not have any thing to drink. Denies injuries. EMS came, was taken to [facility] ER and rec'd IVF. Psychiatric: Denies sleep disturbance. Denies anxiety. Denies depressed mood.     Past Surgical History:   Procedure Laterality Date    APPENDECTOMY  1974    COLONOSCOPY  2019    -polyps(tubular adenomas)hemorrhoids    COLONOSCOPY N/A 2019    COLONOSCOPY POLYPECTOMY SNARE/COLD BIOPSY performed by Chaz Gonzales MD at [address]    (-)    CYSTOSCOPY  2012    retrograde Dr. Kya Martell: (-)    CYSTOURETHROSCOPY  4/3/00,89, 3/16/2012    TONSILLECTOMY AND ADENOIDECTOMY      WRIST FRACTURE SURGERY Left     ORIF     Family History   Problem Relation Age of Onset   [place] [facility] Cancer Father         leukemia    Depression Sister     No Known Problems Mother      Past Medical History:   Diagnosis Date    Benign hematuria     Diabetes mellitus ([facility] [address] 2013    A1C 7.2    Esophageal reflux 2001    Gastritis     GERD (gastroesophageal reflux disease)     Hematuria     Hyperglycemia 1999    Hyperlipidemia     Hypertension     Obesity, unspecified 2015    Other abnormal glucose 2015    Other and unspecified hyperlipidemia 1992    Renal cyst     bilat    Syncope and collapse 2015    Type II or unspecified type diabetes mellitus without mention of complication, not stated as uncontrolled     Unspecified essential hypertension 10/1991    borderline    Vasodepressor syncope 2006      Social History     Tobacco Use    Smoking status: Former Smoker     Packs/day: 0.75     Years: 30.00     Pack years: 22.50     Types: Cigars, Cigarettes, Pipe     Quit date: 2012     Years since quittin.5    Smokeless tobacco: Never Used   Substance Use Topics    Alcohol use:  Yes     Alcohol/week: 0.0 standard drinks     Comment: Rarely      Current Outpatient Medications   Medication Sig Dispense Refill    metFORMIN (GLUCOPHAGE-XR) 500 MG extended release tablet TAKE ONE TABLET BY MOUTH TWICE A  tablet 1    allopurinol (ZYLOPRIM) 300 MG tablet TAKE 1/2 TABLET BY MOUTH DAILY 45 tablet 1    rosuvastatin (CRESTOR) 5 MG tablet Take 1 tablet by mouth daily 90 tablet 1    TRULICITY 1.5 MK/4.1MZ SOPN INJECT 1.5 MG UNDER THE SKIN ONCE WEEKLY 12 pen 3    Homeopathic Products (OSCILLOCOCCINUM PO) Take by mouth \"I take this in the fall. \"     Bud Hy 50 MG/5ML SYRP Take by mouth \"I take this in the fall. \"      CINNAMON PO Take 1 tablet by mouth 3 times daily       LUTEIN-ZEAXANTHIN PO Take 1 tablet by mouth daily      therapeutic multivitamin-minerals (THERAGRAN-M) tablet Take 1 tablet by mouth daily.  vitamin D (CHOLECALCIFEROL) 1000 UNIT TABS tablet Take 1,000 Units by mouth daily.  lisinopril (PRINIVIL;ZESTRIL) 5 MG tablet Take 1 tablet by mouth daily 90 tablet 1     No current facility-administered medications for this visit. No Known Allergies    PHYSICAL EXAM:    Ht 5' 5\" (1.651 m)   BMI 32.65 kg/m²   Wt Readings from Last 3 Encounters:   06/24/21 196 lb 3.2 oz (89 kg)   02/23/21 199 lb 6.4 oz (90.4 kg)   12/17/20 197 lb 12.8 oz (89.7 kg)     BP Readings from Last 3 Encounters:   06/24/21 139/87   02/23/21 118/80   12/17/20 120/72     General Appearance: in no acute distress, well developed, well nourished. Eyes: pupils equal, round reactive to light and accommodation. Ears: normal canal and TM's. Nose: nares patent, no lesions. Oral Cavity: mucosa moist.  Throat: clear. Neck/Thyroid: neck supple, full range of motion, no cervical lymphadenopathy, no thyromegaly or carotid bruits. Skin: warm and dry. No suspicious lesions. Heart: regular rate and rhythm. No murmurs. S1, S2 normal, no gallops. Rate 80  Lungs: clear to auscultation bilaterally. Abdomen: bowel sounds present, soft, nontender, nondistended, no masses or organomegaly. Musculoskeletal: normal, full range of motion in knees and hips, no swelling or tenderness. Extremities: no cyanosis or edema. Peripheral Pulses: 2+ throughout, symetric. Neurologic: nonfocal, motor strength normal upper and lower extremities, sensory exam intact. Psych: normal affect, speech fluent. ASSESSMENT:   Diagnosis Orders   1. Vasodepressor syncope     2. Type 2 diabetes mellitus with diabetic nephropathy, without long-term current use of insulin ([facility])  Basic Metabolic Panel    Hemoglobin A1C   3. Essential hypertension  ALT    AST   4. Hyperlipidemia, unspecified hyperlipidemia type  Lipid Panel    CRP,High Sensitivity   5. Chronic renal failure, stage 3a ([facility])       PLAN:  Labs reviewed with no concerns, his A1C did go up some but not as high as where he was. I will continue to monitor his A1C, I also suggest increasing Trulicity but he refuses and states he will watch his diet more. I reviewed his recent syncopal episode with him, we discuss the importance of focusing on hydration when it's hot and sitting down when he feels like he is going to pass out again. I will see him back in 6 months with labs prior. Orders Placed This Encounter   Procedures    Lipid Panel     Standing Status:   Future     Standing Expiration Date:   8/25/2022     Order Specific Question:   Is Patient Fasting?/# of Hours     Answer:   0    Basic Metabolic Panel     Standing Status:   Future     Standing Expiration Date:   8/25/2022    ALT     Standing Status:   Future     Standing Expiration Date:   8/25/2022    AST     Standing Status:   Future     Standing Expiration Date:   8/25/2022    Hemoglobin A1C     Standing Status:   Future     Standing Expiration Date:   8/25/2022    CRP,High Sensitivity     Standing Status:   Future     Standing Expiration Date:   8/25/2022     No orders of the defined types were placed in this encounter. I, Dr. Irais Cook, personally performed the services described in this documentation as scribed by TITO Stacy in my presence, and is both accurate and complete.

## 2021-08-25 NOTE — PATIENT INSTRUCTIONS
SURVEY:    You may be receiving a survey from [employer] regarding your visit today. Please complete the survey to enable us to provide the highest quality of care to you and your family. If you cannot score us a very good on any question, please call the office to discuss how we could of made your experience a very good one. Thank you. PLAN:  Labs reviewed with no concerns, his A1C did go up some but not as high as where he was. I will continue to monitor his A1C, I also suggest increasing Trulicity but he refuses and states he will watch his diet more. I reviewed his recent syncopal episode with him, we discuss the importance of focusing on hydration when it's hot and sitting down when he feels like he is going to pass out again. I will see him back in 6 months with labs prior.

## 2021-11-18 DIAGNOSIS — E11.21 TYPE 2 DIABETES MELLITUS WITH DIABETIC NEPHROPATHY, WITHOUT LONG-TERM CURRENT USE OF INSULIN (HCC): ICD-10-CM

## 2021-11-19 RX ORDER — ALLOPURINOL 300 MG/1
TABLET ORAL
Qty: 45 TABLET | Refills: 1 | Status: SHIPPED | OUTPATIENT
Start: 2021-11-19 | End: 2022-08-16

## 2021-11-19 RX ORDER — METFORMIN HYDROCHLORIDE 500 MG/1
TABLET, EXTENDED RELEASE ORAL
Qty: 180 TABLET | Refills: 1 | Status: SHIPPED | OUTPATIENT
Start: 2021-11-19 | End: 2022-05-20

## 2021-11-28 DIAGNOSIS — E11.21 TYPE 2 DIABETES MELLITUS WITH DIABETIC NEPHROPATHY, WITHOUT LONG-TERM CURRENT USE OF INSULIN (HCC): ICD-10-CM

## 2021-11-28 DIAGNOSIS — I10 ESSENTIAL HYPERTENSION: ICD-10-CM

## 2021-11-28 DIAGNOSIS — E78.5 HYPERLIPIDEMIA, UNSPECIFIED HYPERLIPIDEMIA TYPE: ICD-10-CM

## 2021-11-29 RX ORDER — ROSUVASTATIN CALCIUM 5 MG/1
TABLET, COATED ORAL
Qty: 90 TABLET | Refills: 1 | Status: SHIPPED | OUTPATIENT
Start: 2021-11-29 | End: 2022-06-01

## 2021-12-10 ENCOUNTER — OFFICE VISIT (OUTPATIENT)
Dept: FAMILY MEDICINE CLINIC | Age: 71
End: 2021-12-10
Payer: MEDICARE

## 2021-12-10 ENCOUNTER — HOSPITAL ENCOUNTER (OUTPATIENT)
Dept: LAB | Age: 71
Setting detail: SPECIMEN
Discharge: HOME | End: 2021-12-10
Payer: MEDICARE

## 2021-12-10 VITALS
BODY MASS INDEX: 32.65 KG/M2 | WEIGHT: 196 LBS | HEART RATE: 87 BPM | SYSTOLIC BLOOD PRESSURE: 140 MMHG | RESPIRATION RATE: 16 BRPM | OXYGEN SATURATION: 96 % | TEMPERATURE: 96 F | HEIGHT: 65 IN | DIASTOLIC BLOOD PRESSURE: 90 MMHG

## 2021-12-10 DIAGNOSIS — R05.9 COUGH: ICD-10-CM

## 2021-12-10 DIAGNOSIS — R05.9 COUGH: Primary | ICD-10-CM

## 2021-12-10 DIAGNOSIS — Z20.822 COVID-19 RULED OUT: ICD-10-CM

## 2021-12-10 PROCEDURE — G8484 FLU IMMUNIZE NO ADMIN: HCPCS | Performed by: NURSE PRACTITIONER

## 2021-12-10 PROCEDURE — C9803 HOPD COVID-19 SPEC COLLECT: HCPCS

## 2021-12-10 PROCEDURE — U0003 INFECTIOUS AGENT DETECTION BY NUCLEIC ACID (DNA OR RNA); SEVERE ACUTE RESPIRATORY SYNDROME CORONAVIRUS 2 (SARS-COV-2) (CORONAVIRUS DISEASE [COVID-19]), AMPLIFIED PROBE TECHNIQUE, MAKING USE OF HIGH THROUGHPUT TECHNOLOGIES AS DESCRIBED BY CMS-2020-01-R: HCPCS

## 2021-12-10 PROCEDURE — 4040F PNEUMOC VAC/ADMIN/RCVD: CPT | Performed by: NURSE PRACTITIONER

## 2021-12-10 PROCEDURE — 3017F COLORECTAL CA SCREEN DOC REV: CPT | Performed by: NURSE PRACTITIONER

## 2021-12-10 PROCEDURE — U0005 INFEC AGEN DETEC AMPLI PROBE: HCPCS

## 2021-12-10 PROCEDURE — 1036F TOBACCO NON-USER: CPT | Performed by: NURSE PRACTITIONER

## 2021-12-10 PROCEDURE — G8427 DOCREV CUR MEDS BY ELIG CLIN: HCPCS | Performed by: NURSE PRACTITIONER

## 2021-12-10 PROCEDURE — G8417 CALC BMI ABV UP PARAM F/U: HCPCS | Performed by: NURSE PRACTITIONER

## 2021-12-10 PROCEDURE — 99211 OFF/OP EST MAY X REQ PHY/QHP: CPT

## 2021-12-10 PROCEDURE — 1123F ACP DISCUSS/DSCN MKR DOCD: CPT | Performed by: NURSE PRACTITIONER

## 2021-12-10 PROCEDURE — 99213 OFFICE O/P EST LOW 20 MIN: CPT | Performed by: NURSE PRACTITIONER

## 2021-12-10 RX ORDER — GUAIFENESIN 600 MG/1
600 TABLET, EXTENDED RELEASE ORAL 2 TIMES DAILY PRN
Qty: 30 TABLET | Refills: 0 | Status: SHIPPED | OUTPATIENT
Start: 2021-12-10 | End: 2021-12-25

## 2021-12-10 RX ORDER — AZITHROMYCIN 250 MG/1
TABLET, FILM COATED ORAL
Qty: 6 TABLET | Refills: 0 | Status: SHIPPED | OUTPATIENT
Start: 2021-12-10 | End: 2022-01-27 | Stop reason: ALTCHOICE

## 2021-12-10 RX ORDER — ALBUTEROL SULFATE 90 UG/1
2 AEROSOL, METERED RESPIRATORY (INHALATION) EVERY 4 HOURS PRN
Qty: 18 G | Refills: 1 | Status: SHIPPED | OUTPATIENT
Start: 2021-12-10

## 2021-12-10 ASSESSMENT — ENCOUNTER SYMPTOMS
RHINORRHEA: 1
COUGH: 1
WHEEZING: 0
SORE THROAT: 0
SHORTNESS OF BREATH: 0

## 2021-12-10 NOTE — PROGRESS NOTES
Name: Sravan Gutierrez  : 1950         Chief Complaint:     Chief Complaint   Patient presents with    Cough     patient states he has had a cough for 2 weeks now. unable to give date. states has tried otc meds with no relife.  Congestion     nasal drainge        History of Present Illness:      Sravan Gutierrez is a 66 y.o.  male who presents with Cough (patient states he has had a cough for 2 weeks now. unable to give date. states has tried otc meds with no relife. ) and Congestion (nasal drainge )      HPI  The patient presents with cough that began 2 weeks ago. Cough is productive with light green sputum. Denies SOB or wheezing. Denies fever, chills, sore throat, or ear pain. Admits nasal congestion and nasal drainage. He has tried airborn and other OTC medications with some relief. Denies worsening fatigue, weakness, lightheadedness, or dizziness. Denies sick contacts. Denies known covid exposures. He has received covid and influenza vaccines.      Past Medical History:     Past Medical History:   Diagnosis Date    Benign hematuria     Diabetes mellitus ([facility] [address] 2013    A1C 7.2    Esophageal reflux 2001    Gastritis     GERD (gastroesophageal reflux disease)     Hematuria     Hyperglycemia 1999    Hyperlipidemia     Hypertension     Obesity, unspecified 2015    Other abnormal glucose 2015    Other and unspecified hyperlipidemia 1992    Renal cyst     bilat    Syncope and collapse 2015    Type II or unspecified type diabetes mellitus without mention of complication, not stated as uncontrolled     Unspecified essential hypertension 10/1991    borderline    Vasodepressor syncope 2006      Reviewed all health maintenance requirements and ordered appropriate tests  Health Maintenance Due   Topic Date Due    COVID-19 Vaccine (1) Never done    Diabetic retinal exam  Never done    Low dose CT lung screening  2018    Diabetic foot exam  2021 Past Surgical History:     Past Surgical History:   Procedure Laterality Date    APPENDECTOMY  1974    COLONOSCOPY  05/13/2019    -polyps(tubular adenomas)hemorrhoids    COLONOSCOPY N/A 5/13/2019    COLONOSCOPY POLYPECTOMY SNARE/COLD BIOPSY performed by Jenifer Kevin MD at [address]    (-)    CYSTOSCOPY  03/2012    retrograde Dr. Viktoriya Witt: (-)    CYSTOURETHROSCOPY  4/3/00,6/13/89, 3/16/2012    TONSILLECTOMY AND ADENOIDECTOMY      WRIST FRACTURE SURGERY Left     ORIF        Medications:       Prior to Admission medications    Medication Sig Start Date End Date Taking? Authorizing Provider   azithromycin (ZITHROMAX Z-MICHELLE) 250 MG tablet Take 2 tablets (500mg) by mouth once daily on day 1. Take 1 tablet (250mg) by mouth once daily on days 2 - 5. 12/10/21  Yes AARON Horton CNP   guaiFENesin (MUCINEX) 600 MG extended release tablet Take 1 tablet by mouth 2 times daily as needed for Congestion 12/10/21 12/25/21 Yes AARON Horton CNP   albuterol sulfate HFA (VENTOLIN HFA) 108 (90 Base) MCG/ACT inhaler Inhale 2 puffs into the lungs every 4 hours as needed for Wheezing or Shortness of Breath 12/10/21  Yes AARON Horton CNP   rosuvastatin (CRESTOR) 5 MG tablet TAKE ONE TABLET BY MOUTH DAILY 11/29/21  Yes Tiffany Morales MD   allopurinol (ZYLOPRIM) 300 MG tablet TAKE 1/2 TABLET BY MOUTH DAILY 11/19/21  Yes Tiffany Morales MD   metFORMIN (GLUCOPHAGE-XR) 500 MG extended release tablet TAKE ONE TABLET BY MOUTH TWICE A DAY 11/19/21  Yes Tiffany Morales MD   TRULICITY 1.5 GI/5.5MV SOPN INJECT 1.5 MG UNDER THE SKIN ONCE WEEKLY 2/2/21  Yes Tiffany Morales MD   Homeopathic Products (OSCILLOCOCCINUM PO) Take by mouth \"I take this in the fall. \"   Yes Historical Provider, MD   Black Elderberry 50 MG/5ML SYRP Take by mouth \"I take this in the fall. \"   Yes Historical Provider, MD   CINNAMON PO Take 1 tablet by mouth 3 times daily    Yes Historical Provider, MD LUTEIN-ZEAXANTHIN PO Take 1 tablet by mouth daily   Yes Historical Provider, MD   therapeutic multivitamin-minerals (THERAGRAN-M) tablet Take 1 tablet by mouth daily. Yes Historical Provider, MD   vitamin D (CHOLECALCIFEROL) 1000 UNIT TABS tablet Take 1,000 Units by mouth daily. Yes Historical Provider, MD   lisinopril (PRINIVIL;ZESTRIL) 5 MG tablet Take 1 tablet by mouth daily 12/17/20 3/17/21  Ever Galvez DO        Allergies:       Patient has no known allergies. Social History:     Tobacco:    reports that he quit smoking about 9 years ago. His smoking use included cigars, cigarettes, and pipe. He has a 22.50 pack-year smoking history. He has never used smokeless tobacco.  Alcohol:      reports current alcohol use. Drug Use:  reports no history of drug use. Family History:     Family History   Problem Relation Age of Onset   Paige Jarrett Cancer Father         leukemia    Depression Sister     No Known Problems Mother        Review of Systems:     Positive and Negative as described in HPI    Review of Systems   Constitutional: Negative for chills, fatigue and fever. HENT: Positive for congestion and rhinorrhea. Negative for ear pain and sore throat. Respiratory: Positive for cough. Negative for shortness of breath and wheezing. Neurological: Negative for dizziness, weakness and light-headedness. Physical Exam:   Vitals:  BP (!) 140/90   Pulse 87   Temp 96 °F (35.6 °C)   Resp 16   Ht 5' 5\" (1.651 m)   Wt 196 lb (88.9 kg)   SpO2 96% Comment: 458   L/min   BMI 32.62 kg/m²     Physical Exam  Constitutional:       General: He is not in acute distress. Appearance: Normal appearance. He is normal weight. He is not ill-appearing or toxic-appearing. HENT:      Head: Normocephalic. Right Ear: Tympanic membrane, ear canal and external ear normal. There is impacted cerumen. Left Ear: Tympanic membrane, ear canal and external ear normal. There is impacted cerumen. Nose: Nose normal. No congestion or rhinorrhea. Mouth/Throat:      Mouth: Mucous membranes are moist.      Pharynx: No oropharyngeal exudate or posterior oropharyngeal erythema. Cardiovascular:      Rate and Rhythm: Normal rate and regular rhythm. Heart sounds: Normal heart sounds. No murmur heard. Pulmonary:      Effort: Pulmonary effort is normal. No respiratory distress. Breath sounds: Normal breath sounds. No stridor. No wheezing, rhonchi or rales. Musculoskeletal:      Cervical back: Neck supple. Lymphadenopathy:      Cervical: No cervical adenopathy. Skin:     General: Skin is warm. Neurological:      Mental Status: He is alert and oriented to person, place, and time. Psychiatric:         Mood and Affect: Mood normal.         Behavior: Behavior normal.         Thought Content: Thought content normal.         Judgment: Judgment normal.         Data:     Lab Results   Component Value Date     08/18/2021    K 4.3 08/18/2021     08/18/2021    CO2 23 08/18/2021    BUN 29 08/18/2021    CREATININE 1.48 08/18/2021    GLUCOSE 138 08/18/2021    PROT 6.2 08/17/2017    LABALBU 4.1 08/17/2017    BILITOT 0.8 08/17/2017    ALKPHOS 90 08/17/2017    ALKPHOS 83 09/27/2015    AST 30 08/18/2021    ALT 47 08/18/2021     Lab Results   Component Value Date    WBC 7.8 08/18/2021    WBC 8.9 01/30/2019    RBC 4.66 08/18/2021    HGB 14.8 08/18/2021    HCT 43.4 08/18/2021    MCV 93 08/18/2021    MCH 31.8 08/18/2021    MCHC 34.1 08/18/2021    RDW 14.0 08/18/2021     08/18/2021     01/30/2019    MPV 8.7 08/18/2021     No results found for: TSH  Lab Results   Component Value Date    CHOL 149 08/18/2021    CHOL 217 02/14/2015    HDL 45 08/18/2021    LABA1C 7.3 08/18/2021       Assessment/Plan:      Diagnosis Orders   1. Cough  COVID-19    azithromycin (ZITHROMAX Z-MICHELLE) 250 MG tablet   2. COVID-19 ruled out  COVID-19       Hypertension:  -Blood pressure elevated in office 140/90.   He Specific Question:   Date of Symptom Onset     Answer:   11/26/2021     Order Specific Question:   Hospitalized for COVID-19? Answer:   No     Order Specific Question:   Admitted to ICU for COVID-19? Answer:   No     Order Specific Question:   Employed in healthcare setting? Answer:   Unknown     Order Specific Question:   Resident in a congregate (group) care setting? Answer:   Unknown     Order Specific Question:   Pregnant: Answer:   No     Order Specific Question:   Previously tested for COVID-19? Answer:   Unknown        No results found for this visit on 12/10/21. Return if symptoms worsen or fail to improve.     Electronically signed by AARON Izquierdo CNP on 12/10/21 at 2:13 PM.

## 2021-12-10 NOTE — PATIENT INSTRUCTIONS
SURVEY:    You may be receiving a survey from [employer] regarding your visit today. Please complete the survey to enable us to provide the highest quality of care to you and your family. If you cannot score us a very good on any question, please call the office to discuss how we could of made your experience a very good one. Thank you.

## 2021-12-11 LAB
SARS-COV-2: NORMAL
SARS-COV-2: NOT DETECTED
SOURCE: NORMAL

## 2022-01-03 RX ORDER — DULAGLUTIDE 1.5 MG/.5ML
INJECTION, SOLUTION SUBCUTANEOUS
Qty: 12 PEN | Refills: 3 | Status: SHIPPED
Start: 2022-01-03 | End: 2022-08-29 | Stop reason: DRUGHIGH

## 2022-01-18 LAB
BUN BLDV-MCNC: 25 MG/DL
CALCIUM SERPL-MCNC: 8.9 MG/DL
CHLORIDE BLD-SCNC: 105 MMOL/L
CO2: 28 MMOL/L
CREAT SERPL-MCNC: 1.53 MG/DL
CREATININE, URINE: 104.61
GFR CALCULATED: 45
GLUCOSE BLD-MCNC: 157 MG/DL
MICROALBUMIN/CREAT 24H UR: 18.4 MG/G{CREAT}
MICROALBUMIN/CREAT UR-RTO: 175.9
PHOSPHORUS: 2.9 MG/DL
POTASSIUM SERPL-SCNC: 4.4 MMOL/L
PTH INTACT: 96
SODIUM BLD-SCNC: 142 MMOL/L
VITAMIN D 25-HYDROXY: 66.6
VITAMIN D2, 25 HYDROXY: NORMAL
VITAMIN D3,25 HYDROXY: NORMAL

## 2022-01-27 ENCOUNTER — OFFICE VISIT (OUTPATIENT)
Dept: NEPHROLOGY | Age: 72
End: 2022-01-27
Payer: MEDICARE

## 2022-01-27 VITALS
RESPIRATION RATE: 18 BRPM | TEMPERATURE: 97.5 F | HEIGHT: 65 IN | DIASTOLIC BLOOD PRESSURE: 52 MMHG | SYSTOLIC BLOOD PRESSURE: 108 MMHG | WEIGHT: 196.1 LBS | HEART RATE: 87 BPM | BODY MASS INDEX: 32.67 KG/M2

## 2022-01-27 DIAGNOSIS — N28.1 BILATERAL RENAL CYSTS: ICD-10-CM

## 2022-01-27 DIAGNOSIS — R80.9 POSITIVE FOR MACROALBUMINURIA: ICD-10-CM

## 2022-01-27 DIAGNOSIS — I10 ESSENTIAL HYPERTENSION: ICD-10-CM

## 2022-01-27 DIAGNOSIS — N18.31 STAGE 3A CHRONIC KIDNEY DISEASE (HCC): ICD-10-CM

## 2022-01-27 PROCEDURE — G8427 DOCREV CUR MEDS BY ELIG CLIN: HCPCS | Performed by: INTERNAL MEDICINE

## 2022-01-27 PROCEDURE — 99213 OFFICE O/P EST LOW 20 MIN: CPT | Performed by: INTERNAL MEDICINE

## 2022-01-27 PROCEDURE — 1123F ACP DISCUSS/DSCN MKR DOCD: CPT | Performed by: INTERNAL MEDICINE

## 2022-01-27 PROCEDURE — G8417 CALC BMI ABV UP PARAM F/U: HCPCS | Performed by: INTERNAL MEDICINE

## 2022-01-27 PROCEDURE — 1036F TOBACCO NON-USER: CPT | Performed by: INTERNAL MEDICINE

## 2022-01-27 PROCEDURE — 3017F COLORECTAL CA SCREEN DOC REV: CPT | Performed by: INTERNAL MEDICINE

## 2022-01-27 PROCEDURE — G8484 FLU IMMUNIZE NO ADMIN: HCPCS | Performed by: INTERNAL MEDICINE

## 2022-01-27 PROCEDURE — 4040F PNEUMOC VAC/ADMIN/RCVD: CPT | Performed by: INTERNAL MEDICINE

## 2022-01-27 RX ORDER — LISINOPRIL 5 MG/1
5 TABLET ORAL DAILY
Qty: 90 TABLET | Refills: 3 | Status: SHIPPED | OUTPATIENT
Start: 2022-01-27 | End: 2022-07-28

## 2022-01-27 NOTE — PROGRESS NOTES
[facility]  [address] [facility] [address]  Dept: [phone]  Progress Note  1/27/2022 10:13 AM      Pt Name:    Estiven Fernandez  YOB: 1950  Primary Care Physician:  Antonio Klein MD     Chief Complaint:   Chief Complaint   Patient presents with    Follow-up     CKD 3b; renal cysts, denies flank pain or edema in hands or feet        History of Present Illness: This is a follow-up visit for CKD III. Hx of DM, HTN, hyperlipidemia, GERD, gout, renal cysts. Patient is overall doing well. No recent hospitalizations. BP controlled. Denies any dizziness. No difficulty urinating. No edema. Last A1C was 7.3. Pertinent items are noted in HPI.          Past History:  Past Medical History:   Diagnosis Date    Benign hematuria     Diabetes mellitus ([facility] [address] 02/09/2013    A1C 7.2    Esophageal reflux 7/2001    Gastritis     GERD (gastroesophageal reflux disease)     Hematuria     Hyperglycemia 9/1999    Hyperlipidemia     Hypertension     Obesity, unspecified 2/2/2015    Other abnormal glucose 2/2/2015    Other and unspecified hyperlipidemia 7/1992    Renal cyst 1983    bilat    Syncope and collapse 2/2/2015    Type II or unspecified type diabetes mellitus without mention of complication, not stated as uncontrolled 2012    Unspecified essential hypertension 10/1991    borderline    Vasodepressor syncope 9/2006     Past Surgical History:   Procedure Laterality Date    APPENDECTOMY  1974    COLONOSCOPY  05/13/2019    -polyps(tubular adenomas)hemorrhoids    COLONOSCOPY N/A 5/13/2019    COLONOSCOPY POLYPECTOMY [facility]ARE[facility]    [facility]    CYSTOSCOPY  03/2012    retrograde Dr. Rehman Gather: (-)    CYSTOURETHROSCOPY  4/3/00,6/13/89, 3/16/2012    TONSILLECTOMY AND ADENOIDECTOMY      WRIST FRACTURE SURGERY Left [facility]        VITALS:  BP (!) 108/52 (Site: Left Upper Arm, Position: Standing, Cuff Size: Medium Adult)   Pulse 87   Temp 97.5 °F (36.4 °C) (Temporal)   Resp 18   Ht 5' 5\" (1.651 m)   Wt 196 lb 1.6 oz (89 kg)   BMI 32.63 kg/m²   Wt Readings from Last 3 Encounters:   01/27/22 196 lb 1.6 oz (89 kg)   12/10/21 196 lb (88.9 kg)   06/24/21 196 lb 3.2 oz (89 kg)     Body mass index is 32.63 kg/m². General Appearance: alert and cooperative with exam, appears comfortable, no distress  HEENT: EOMI, moist oral mucus membranes  Neck: No jugular venous distention,   Lungs: Air entry B/L, no crackles or rales, no use of accessory muscles  Heart: S1, S2 heard, no rub  GI: soft, non-tender, no guarding,   Extremities: No sig LE edema, no cyanosis  Skin: warm, dry  Neurologic: no tremor, no asterixis, no focal neurologic deficits     Medications:  Current Outpatient Medications   Medication Sig Dispense Refill    TRULICITY 1.5 UN/8.7KD SOPN INJECT 1.5 MG UNDER THE SKIN ONCE WEEKLY 12 pen 3    albuterol sulfate HFA (VENTOLIN HFA) 108 (90 Base) MCG/ACT inhaler Inhale 2 puffs into the lungs every 4 hours as needed for Wheezing or Shortness of Breath 18 g 1    rosuvastatin (CRESTOR) 5 MG tablet TAKE ONE TABLET BY MOUTH DAILY 90 tablet 1    allopurinol (ZYLOPRIM) 300 MG tablet TAKE 1/2 TABLET BY MOUTH DAILY 45 tablet 1    metFORMIN (GLUCOPHAGE-XR) 500 MG extended release tablet TAKE ONE TABLET BY MOUTH TWICE A  tablet 1    lisinopril (PRINIVIL;ZESTRIL) 5 MG tablet Take 1 tablet by mouth daily 90 tablet 1    CINNAMON PO Take 1 tablet by mouth 3 times daily       LUTEIN-ZEAXANTHIN PO Take 1 tablet by mouth daily      vitamin D (CHOLECALCIFEROL) 1000 UNIT TABS tablet Take 1,000 Units by mouth daily.  Homeopathic Products (OSCILLOCOCCINUM PO) Take by mouth \"I take this in the fall. \" (Patient not taking: Reported on 1/27/2022)      Black Elderberry 50 MG/5ML SYRP Take by mouth \"I take this in the fall. \" (Patient not taking: Reported on 1/27/2022)      therapeutic multivitamin-minerals (THERAGRAN-M) tablet Take 1 tablet by mouth daily. (Patient not taking: Reported on 1/27/2022)       No current facility-administered medications for this visit.         Laboratory & Diagnostics:  CBC:   Lab Results   Component Value Date    WBC 7.8 08/18/2021    HGB 14.8 08/18/2021    HCT 43.4 08/18/2021    MCV 93 08/18/2021     08/18/2021     BMP:    Lab Results   Component Value Date     01/18/2022     08/18/2021     06/19/2021    K 4.4 01/18/2022    K 4.3 08/18/2021    K 4.0 06/19/2021     01/18/2022     08/18/2021     06/19/2021    CO2 28 01/18/2022    CO2 23 08/18/2021    CO2 21 06/19/2021    BUN 25 01/18/2022    BUN 29 (H) 08/18/2021    BUN 29 06/19/2021    CREATININE 1.53 01/18/2022    CREATININE 1.48 (H) 08/18/2021    CREATININE 1.69 06/19/2021    GLUCOSE 157 01/18/2022    GLUCOSE 138 (H) 08/18/2021    GLUCOSE 341 06/19/2021      Hepatic:   Lab Results   Component Value Date    AST 30 08/18/2021    AST 31 02/17/2021    AST 29 08/18/2020    ALT 47 (H) 08/18/2021    ALT 40 02/17/2021    ALT 36 08/18/2020    BILITOT 0.8 08/17/2017    BILITOT 0.6 08/05/2016    BILITOT 0.37 09/27/2015    ALKPHOS 90 08/17/2017    ALKPHOS 81 08/05/2016    ALKPHOS 83 09/27/2015     BNP: No results found for: BNP  Lipids:   Lab Results   Component Value Date    CHOL 149 (L) 08/18/2021    HDL 45 08/18/2021     INR: No results found for: INR  URINE: No results found for: NAUR, PROTUR  Lab Results   Component Value Date    NITRU Negative 12/07/2020    COLORU Yellow 12/07/2020    PHUR 6.0 12/07/2020    WBCUA None 09/27/2015    RBCUA 0 TO 2 09/27/2015    MUCUS NOT REPORTED 09/27/2015    TRICHOMONAS NOT REPORTED 09/27/2015    YEAST NOT REPORTED 09/27/2015    BACTERIA NOT REPORTED 09/27/2015    CLARITYU Clear 12/07/2020    SPECGRAV >1.030 09/27/2015    LEUKOCYTESUR Negative 12/07/2020    UROBILINOGEN Normal 12/07/2020    BILIRUBINUR Negative 12/07/2020    BLOODU Negative 12/07/2020    GLUCOSEU negative 12/07/2020    GLUCOSEU Negative 03/08/2012    KETUA Negative 12/07/2020    AMORPHOUS NOT REPORTED 09/27/2015      Microalbumen/Creatinine ratio:  No components found for: RUCREAT        Impression/Plan:   1. CKD IIIb due to diabetic kidney disease  : stable  Goals of care include slowing rate of progression by controlling blood pressure, blood glucoses and albuminuria and by avoiding nephrotoxins such as NSAIDs and IV contrast.   2. Proteinuria: continue Lisinopril 5 mg daily. Refill provided  3. B/L simple renal cysts: stable compared to imaging in 2014  4. HTN; stable  5. DM  6. Gout    Bloodwork and medications were reviewed and plan of care discussed with the patient. Return to clinic in 6 months  or sooner if the need arises.       Jennifer Arnold,   [facility]

## 2022-01-27 NOTE — PATIENT INSTRUCTIONS
SURVEY:    You may be receiving a survey from [employer] regarding your visit today. Please complete the survey to enable us to provide the highest quality of care to you and your family. If you cannot score us a very good on any question, please call the office to discuss how we could have made your experience a very good one. Thank you.

## 2022-02-23 LAB
ALT SERPL-CCNC: 41 U/L (ref 0–40)
ANION GAP SERPL CALCULATED.3IONS-SCNC: 13 MMOL/L (ref 5–15)
AST SERPL-CCNC: 24 U/L (ref 0–41)
AVERAGE GLUCOSE: 166 MG/DL
BUN BLDV-MCNC: 25 MG/DL (ref 5–27)
CALCIUM SERPL-MCNC: 10 MG/DL (ref 8.5–10.5)
CHLORIDE BLD-SCNC: 106 MMOL/L (ref 98–109)
CHOLESTEROL/HDL RATIO: 2.9 (ref 1–5)
CHOLESTEROL: 140 MG/DL (ref 150–200)
CO2: 23 MMOL/L (ref 22–32)
CREAT SERPL-MCNC: 1.69 MG/DL (ref 0.6–1.3)
EGFR AFRICAN AMERICAN: 48 ML/MIN/1.73SQ.M
EGFR IF NONAFRICAN AMERICAN: 40 ML/MIN/1.73SQ.M
GLUCOSE: 124 MG/DL (ref 65–99)
HBA1C MFR BLD: 7.4 % (ref 4.4–6.4)
HDLC SERPL-MCNC: 48 MG/DL
HIGH SENSITIVE C-REACTIVE PROTEIN: 0.32 MG/DL (ref 0–0.74)
LDL CHOLESTEROL CALCULATED: 47 MG/DL
LDL/HDL RATIO: 1
POTASSIUM SERPL-SCNC: 4.2 MMOL/L (ref 3.5–5)
SODIUM BLD-SCNC: 142 MMOL/L (ref 134–146)
TRIGL SERPL-MCNC: 223 MG/DL (ref 27–150)
VLDLC SERPL CALC-MCNC: 45 MG/DL (ref 0–30)

## 2022-02-28 ENCOUNTER — OFFICE VISIT (OUTPATIENT)
Dept: FAMILY MEDICINE CLINIC | Age: 72
End: 2022-02-28
Payer: MEDICARE

## 2022-02-28 VITALS
WEIGHT: 196 LBS | HEIGHT: 65 IN | SYSTOLIC BLOOD PRESSURE: 122 MMHG | BODY MASS INDEX: 32.65 KG/M2 | DIASTOLIC BLOOD PRESSURE: 78 MMHG

## 2022-02-28 DIAGNOSIS — E78.5 HYPERLIPIDEMIA, UNSPECIFIED HYPERLIPIDEMIA TYPE: ICD-10-CM

## 2022-02-28 DIAGNOSIS — R55 VASODEPRESSOR SYNCOPE: ICD-10-CM

## 2022-02-28 DIAGNOSIS — E11.21 TYPE 2 DIABETES MELLITUS WITH DIABETIC NEPHROPATHY, WITHOUT LONG-TERM CURRENT USE OF INSULIN (HCC): ICD-10-CM

## 2022-02-28 DIAGNOSIS — M10.9 GOUT, UNSPECIFIED CAUSE, UNSPECIFIED CHRONICITY, UNSPECIFIED SITE: ICD-10-CM

## 2022-02-28 DIAGNOSIS — I10 ESSENTIAL HYPERTENSION: ICD-10-CM

## 2022-02-28 DIAGNOSIS — E66.9 CLASS 1 OBESITY WITH SERIOUS COMORBIDITY AND BODY MASS INDEX (BMI) OF 32.0 TO 32.9 IN ADULT, UNSPECIFIED OBESITY TYPE: ICD-10-CM

## 2022-02-28 DIAGNOSIS — N18.31 CHRONIC RENAL FAILURE, STAGE 3A (HCC): ICD-10-CM

## 2022-02-28 DIAGNOSIS — L57.0 AK (ACTINIC KERATOSIS): ICD-10-CM

## 2022-02-28 DIAGNOSIS — Z23 NEED FOR PNEUMOCOCCAL VACCINATION: ICD-10-CM

## 2022-02-28 DIAGNOSIS — Z00.00 ENCOUNTER FOR ANNUAL WELLNESS EXAM IN MEDICARE PATIENT: Primary | ICD-10-CM

## 2022-02-28 PROBLEM — N18.30 CHRONIC RENAL FAILURE, STAGE 3 (MODERATE) (HCC): Status: RESOLVED | Noted: 2019-08-28 | Resolved: 2022-02-28

## 2022-02-28 PROBLEM — I95.1 ORTHOSTASIS: Status: RESOLVED | Noted: 2018-09-17 | Resolved: 2022-02-28

## 2022-02-28 PROBLEM — H61.21 IMPACTED CERUMEN OF RIGHT EAR: Status: RESOLVED | Noted: 2019-08-28 | Resolved: 2022-02-28

## 2022-02-28 PROBLEM — R80.9 POSITIVE FOR MACROALBUMINURIA: Status: RESOLVED | Noted: 2020-09-24 | Resolved: 2022-02-28

## 2022-02-28 PROBLEM — R80.9 PROTEINURIA: Status: RESOLVED | Noted: 2017-02-24 | Resolved: 2022-02-28

## 2022-02-28 PROBLEM — R19.5 POSITIVE FIT (FECAL IMMUNOCHEMICAL TEST): Status: RESOLVED | Noted: 2019-05-10 | Resolved: 2022-02-28

## 2022-02-28 PROBLEM — M25.376: Status: RESOLVED | Noted: 2017-06-13 | Resolved: 2022-02-28

## 2022-02-28 PROCEDURE — 4040F PNEUMOC VAC/ADMIN/RCVD: CPT | Performed by: FAMILY MEDICINE

## 2022-02-28 PROCEDURE — PBSHW PNEUMOCOCCAL POLYSACCHARIDE VACCINE 23-VALENT =>2YO SQ/IM: Performed by: FAMILY MEDICINE

## 2022-02-28 PROCEDURE — 1123F ACP DISCUSS/DSCN MKR DOCD: CPT | Performed by: FAMILY MEDICINE

## 2022-02-28 PROCEDURE — G8427 DOCREV CUR MEDS BY ELIG CLIN: HCPCS | Performed by: FAMILY MEDICINE

## 2022-02-28 PROCEDURE — 1036F TOBACCO NON-USER: CPT | Performed by: FAMILY MEDICINE

## 2022-02-28 PROCEDURE — 17000 DESTRUCT PREMALG LESION: CPT | Performed by: FAMILY MEDICINE

## 2022-02-28 PROCEDURE — G0439 PPPS, SUBSEQ VISIT: HCPCS | Performed by: FAMILY MEDICINE

## 2022-02-28 PROCEDURE — G8484 FLU IMMUNIZE NO ADMIN: HCPCS | Performed by: FAMILY MEDICINE

## 2022-02-28 PROCEDURE — 99211 OFF/OP EST MAY X REQ PHY/QHP: CPT | Performed by: FAMILY MEDICINE

## 2022-02-28 PROCEDURE — 17003 DESTRUCT PREMALG LES 2-14: CPT | Performed by: FAMILY MEDICINE

## 2022-02-28 PROCEDURE — 99213 OFFICE O/P EST LOW 20 MIN: CPT | Performed by: FAMILY MEDICINE

## 2022-02-28 PROCEDURE — 3051F HG A1C>EQUAL 7.0%<8.0%: CPT | Performed by: FAMILY MEDICINE

## 2022-02-28 PROCEDURE — 90732 PPSV23 VACC 2 YRS+ SUBQ/IM: CPT | Performed by: FAMILY MEDICINE

## 2022-02-28 PROCEDURE — G8417 CALC BMI ABV UP PARAM F/U: HCPCS | Performed by: FAMILY MEDICINE

## 2022-02-28 PROCEDURE — 3017F COLORECTAL CA SCREEN DOC REV: CPT | Performed by: FAMILY MEDICINE

## 2022-02-28 PROCEDURE — 2022F DILAT RTA XM EVC RTNOPTHY: CPT | Performed by: FAMILY MEDICINE

## 2022-02-28 SDOH — ECONOMIC STABILITY: FOOD INSECURITY: WITHIN THE PAST 12 MONTHS, YOU WORRIED THAT YOUR FOOD WOULD RUN OUT BEFORE YOU GOT MONEY TO BUY MORE.: NEVER TRUE

## 2022-02-28 SDOH — ECONOMIC STABILITY: FOOD INSECURITY: WITHIN THE PAST 12 MONTHS, THE FOOD YOU BOUGHT JUST DIDN'T LAST AND YOU DIDN'T HAVE MONEY TO GET MORE.: NEVER TRUE

## 2022-02-28 ASSESSMENT — PATIENT HEALTH QUESTIONNAIRE - PHQ9
2. FEELING DOWN, DEPRESSED OR HOPELESS: 0
SUM OF ALL RESPONSES TO PHQ9 QUESTIONS 1 & 2: 0
SUM OF ALL RESPONSES TO PHQ QUESTIONS 1-9: 0
1. LITTLE INTEREST OR PLEASURE IN DOING THINGS: 0
SUM OF ALL RESPONSES TO PHQ QUESTIONS 1-9: 0

## 2022-02-28 ASSESSMENT — LIFESTYLE VARIABLES
HOW OFTEN DO YOU HAVE A DRINK CONTAINING ALCOHOL: MONTHLY OR LESS
HOW MANY STANDARD DRINKS CONTAINING ALCOHOL DO YOU HAVE ON A TYPICAL DAY: 1 OR 2

## 2022-02-28 ASSESSMENT — SOCIAL DETERMINANTS OF HEALTH (SDOH): HOW HARD IS IT FOR YOU TO PAY FOR THE VERY BASICS LIKE FOOD, HOUSING, MEDICAL CARE, AND HEATING?: NOT HARD AT ALL

## 2022-02-28 NOTE — PATIENT INSTRUCTIONS
plan:  The history listed above was reviewed today and is accurate. He continues to follow with the nephrologist, Dr. Maddy Carranza and we discuss the importance of balancing sodium intake and maintaining his blood pressure. His labs are review. His Hemoglobin A1C level is 7.4. As long as he keeps this below 7.5, I am not going to get too concerned with this as the risk for hypoglycemia can be more catastrophic. I offer to perform cryotherapy to the lesions on his scalp and face. I recommend that he get a shingrix vaccine at the pharmacy. I will see him back in 6 months. SURVEY:    You may be receiving a survey from [employer] regarding your visit today. Please complete the survey to enable us to provide the highest quality of care to you and your family. If you cannot score us a very good on any question, please call the office to discuss how we could of made your experience a very good one. Thank you.
Detail Level: Zone
Quality 110: Preventive Care And Screening: Influenza Immunization: Influenza immunization was not ordered or administered, reason not given

## 2022-02-28 NOTE — PROGRESS NOTES
Recommendations for Preventive Services Due: see orders and patient instructions/AVS.  Recommended screening schedule for the next 5-10 years is provided to the patient in written form: see Patient Instructions/AVS.     Return in about 6 months (around 8/28/2022). Subjective   The following acute and/or chronic problems were also addressed today:  DIABETES MELLITUS TYPE II   Medication compliance: compliant most of the time  Diabetic diet compliance: compliant most of the time  Current exercise: none  Frequency of testing: none  Any episodes of hypoglycemia? no  Eye exam current (within one year): Yes, Dr. Gavin Aguilar  Diabetic foot check in the past year: yes, today.    reports that he quit smoking about 5 years ago. His smoking use included Cigarettes, Pipe, and Cigars. He quit after 30.00 years of use. He has never used smokeless tobacco.      HYPERTENSION  He is not exercising and is adherent to a low-salt diet.  Blood pressure is not being monitored at home. Cardiac symptoms: none. Patient denies: chest pain, irregular heart beat and palpitations.     HYPERLIPIDEMIA    Medication compliance: compliant all of the time. Patient is  following a low fat, low cholesterol diet. He is gets his exersice from regular daily activities. ROS:  General Constitutional: Denies chills. Denies fever. Denies headache. Denies lightheadedness. Ophthalmologic: Denies blurred vision. ENT: Denies nasal congestion. Denies sore throat. Denies ear pain and pressure. Respiratory: Denies cough. Denies shortness of breath. Denies wheezing. Cardiovascular: Denies chest pain at rest. Denies irregular heartbeat. Denies palpitations. Gastrointestinal: Denies abdominal pain. Denies blood in the stool. Denies constipation. Denies diarrhea. Denies nausea. Denies vomiting. Genitourinary: Denies blood in the urine. Denies difficulty urinating. Denies frequent urination. Denies painful urination.  Denies urinary incontinence  Musculoskeletal: Denies muscle aches. Denies painful joints. Denies swollen joints. Peripheral Vascular: Denies pain/cramping in legs after exertion. Skin: Denies dry skin. Denies itching. Denies rash. Neurologic: Denies falls. Denies dizziness. Denies fainting. Denies tingling/numbness. Psychiatric: Denies sleep disturbance. Denies anxiety. Denies depressed mood. Patient's complete Health Risk Assessment and screening values have been reviewed and are found in Flowsheets. The following problems were reviewed today and where indicated follow up appointments were made and/or referrals ordered.     Positive Risk Factor Screenings with Interventions:               General Health and ACP:  General  In general, how would you say your health is?: Good  In the past 7 days, have you experienced any of the following: New or Increased Pain, New or Increased Fatigue, Loneliness, Social Isolation, Stress or Anger?: No  Do you get the social and emotional support that you need?: Yes  Do you have a Living Will?: Yes    Advance Directives     Power of  Living Will ACP-Advance Directive ACP-Power of     Not on File Not on File Not on File Not on File      General Health Risk Interventions:  · see plan for any interventions    Health Habits/Nutrition:     Physical Activity: Insufficiently Active    Days of Exercise per Week: 3 days    Minutes of Exercise per Session: 20 min     Have you lost any weight without trying in the past 3 months?: No  Body mass index: (!) 32.61  Have you seen the dentist within the past year?: Yes    Health Habits/Nutrition Interventions:  · see plan for any interventions    Hearing/Vision:  Do you or your family notice any trouble with your hearing that hasn't been managed with hearing aids?: (!) Yes  Do you have difficulty driving, watching TV, or doing any of your daily activities because of your eyesight?: No  Have you had an eye exam within the past year?: Yes  No exam data present    Safety:  Do you have working smoke detectors?: Yes  Do you have any tripping hazards - loose or unsecured carpets or rugs?: (!) Yes  Do you have any tripping hazards - clutter in doorways, halls, or stairs?: (!) Yes  Do you have either shower bars, grab bars, non-slip mats or non-slip surfaces in your shower or bathtub?: Yes  Do all of your stairways have a railing or banister?: Yes  Do you always fasten your seatbelt when you are in a car?: Yes           Objective   Vitals:    02/28/22 1337   BP: 122/78   Weight: 196 lb (88.9 kg)   Height: 5' 5\" (1.651 m)      Body mass index is 32.62 kg/m². General Appearance: alert and oriented to person, place and time, well developed and well- nourished, in no acute distress  Skin: warm and dry, no rash or erythema SK with rough warty texture, R forehead, lightly pigmented. Few darker pigmented SK on left forehead. Several AK on nose near zygoma. Cryo x3: R [place], L pinna, bridge of nose  Head: normocephalic and atraumatic  Eyes: pupils equal, round, and reactive to light, extraocular eye movements intact, conjunctivae normal wearing glasses  ENT: tympanic membrane, external ear and ear canal normal bilaterally, nose without deformity, nasal mucosa and turbinates normal without polyps swollen nasal mucosa. Neck: supple and non-tender without mass, no thyromegaly or thyroid nodules, no cervical lymphadenopathy  Pulmonary/Chest: clear to auscultation bilaterally- no wheezes, rales or rhonchi, normal air movement, no respiratory distress  Cardiovascular: normal rate, regular rhythm, normal S1 and S2, no murmurs, rubs, clicks, or gallops, distal pulses intact, no carotid bruits  Abdomen: soft, non-tender, non-distended, normal bowel sounds, no masses or organomegaly  Extremities: no cyanosis, clubbing or edema round. Obese.    Musculoskeletal: normal range of motion, no joint swelling, deformity or tenderness  Neurologic: reflexes normal and symmetric, no cranial nerve deficit, gait, coordination and speech normal  Podiatric: Diabetic foot check: monofilament testing: normal bilaterally                                       Vibratory testing: normal bilaterally         No Known Allergies  Prior to Visit Medications    Medication Sig Taking? Authorizing Provider   lisinopril (PRINIVIL;ZESTRIL) 5 MG tablet Take 1 tablet by mouth daily Yes Juanis Galvez DO   TRULICITY 1.5 ZR/9.6JT SOPN INJECT 1.5 MG UNDER THE SKIN ONCE WEEKLY Yes Arvin Chavez MD   rosuvastatin (CRESTOR) 5 MG tablet TAKE ONE TABLET BY MOUTH DAILY Yes Arvin Chavez MD   allopurinol (ZYLOPRIM) 300 MG tablet TAKE 1/2 TABLET BY MOUTH DAILY Yes Arvin Chavez MD   metFORMIN (GLUCOPHAGE-XR) 500 MG extended release tablet TAKE ONE TABLET BY MOUTH TWICE A DAY Yes Arvin Chavez MD   CINNAMON PO Take 1 tablet by mouth 3 times daily  Yes Historical Provider, MD   LUTEIN-ZEAXANTHIN PO Take 1 tablet by mouth daily Yes Historical Provider, MD   vitamin D (CHOLECALCIFEROL) 1000 UNIT TABS tablet Take 1,000 Units by mouth daily. Yes Historical Provider, MD   albuterol sulfate HFA (VENTOLIN HFA) 108 (90 Base) MCG/ACT inhaler Inhale 2 puffs into the lungs every 4 hours as needed for Wheezing or Shortness of Breath  Patient not taking: Reported on 2/28/2022  AARON Kelly - CNP   Homeopathic Products (OSCILLOCOCCINUM PO) Take by mouth \"I take this in the fall. \"  Patient not taking: Reported on 1/27/2022  Historical Provider, MD   Black Elderberry 50 MG/5ML SYRP Take by mouth \"I take this in the fall. \"  Patient not taking: Reported on 1/27/2022  Historical Provider, MD   therapeutic multivitamin-minerals [facility]) tablet Take 1 tablet by mouth daily.     Patient not taking: Reported on 1/27/2022  Historical Provider, MD Montiel (Including outside providers/suppliers regularly involved in providing care):   Patient Care Team:  Arvin Chavez MD as PCP - Ollie Cook MD as PCP - [address] Provider    Reviewed and updated this visit:  Allergies  Meds     plan: The history listed above was reviewed today and is accurate. He is scheduled for colonoscopy with Dr Jose Cerda    He continues to follow with the nephrologist, Dr. Maddy Carranza and we discuss the importance of balancing sodium intake and maintaining his blood pressure. His labs are review. His Hemoglobin A1C level is 7.4. As long as he keeps this below 7.5, I am not going to get too concerned with this as the risk for hypoglycemia can be more catastrophic. I offer to perform cryotherapy to the lesions on his scalp and face. I recommend that he get a shingrix vaccine at the pharmacy. He will get another pneumovax 23. I will set him up to have the clavicular SK excised. Due to its size and darker pigment    I will see him back in 6 months. I, Dr. Lakeisha Berry, personally performed the services described in this documentation as scribed/transcribed by PARISA Alexis in my presence, and is both accurate and complete.

## 2022-03-07 ENCOUNTER — HOSPITAL ENCOUNTER (OUTPATIENT)
Age: 72
Setting detail: SPECIMEN
Discharge: HOME | End: 2022-03-07
Payer: MEDICARE

## 2022-03-07 ENCOUNTER — PROCEDURE VISIT (OUTPATIENT)
Dept: FAMILY MEDICINE CLINIC | Age: 72
End: 2022-03-07

## 2022-03-07 DIAGNOSIS — L82.1 SK (SEBORRHEIC KERATOSIS): Primary | ICD-10-CM

## 2022-03-07 DIAGNOSIS — L82.1 SK (SEBORRHEIC KERATOSIS): ICD-10-CM

## 2022-03-07 PROCEDURE — 88305 TISSUE EXAM BY PATHOLOGIST: CPT

## 2022-03-07 PROCEDURE — 99999 PR OFFICE/OUTPT VISIT,PROCEDURE ONLY: CPT | Performed by: FAMILY MEDICINE

## 2022-03-07 NOTE — PROGRESS NOTES
Mari PIERRE CMA, am scribing for and in the presence of Dr. Will Tejeda. 3/7/22/4:25/[facility]    [address] [address]  [place] [address]  Dept: [phone]    EXCISIONAL BIOPSY PROCEDURE NOTE    PRE-OP DIAGNOSIS:  Seborrheic Keratosis                                          The lesions has been irritated and itchy                PROCEDURE:  Skin Lesion Excision(s)    Lesion description: dark pigmentation oval shaped pendulous lesion 2 cm x 3/4 cm    INDICATIONS:  Estiven Fernandez is a 74 y.o. male who presents for minor skin surgery for changing and concerning skin lesion(s). The patient understands all risks, benefits, indications, potential complications, and alternatives, and freely consents for the procedure. The patient also understands the option of performing no surgery, the risk for scarring, and the technique of the procedure. TECHNIQUE:  After informed consent was obtained and after the skin was prepped with Betadine and alcohol and draped in the usual sterile fashion Lidocaine with epinephrine was used as local anesthesia. An incision was made with a #10 blade, the lesion was excised and sent for pathologic evaluation. Bleeding was controlled with. A dressing was applied and wound care instructions were provided. he tolerated the procedure well and without complications. The patient will be alert for any signs of cutaneous infection and will follow up as instructed. Plan:  1. Instructed to keep the wound dry and covered for 24-48h and clean thereafter. 2. Warning signs of infection were reviewed.     3. We will call the patient with the biopsy results

## 2022-03-09 LAB — DERMATOLOGY PATHOLOGY REPORT: NORMAL

## 2022-05-19 DIAGNOSIS — E11.21 TYPE 2 DIABETES MELLITUS WITH DIABETIC NEPHROPATHY, WITHOUT LONG-TERM CURRENT USE OF INSULIN (HCC): ICD-10-CM

## 2022-05-20 RX ORDER — METFORMIN HYDROCHLORIDE 500 MG/1
TABLET, EXTENDED RELEASE ORAL
Qty: 180 TABLET | Refills: 1 | Status: SHIPPED | OUTPATIENT
Start: 2022-05-20

## 2022-06-01 DIAGNOSIS — I10 ESSENTIAL HYPERTENSION: ICD-10-CM

## 2022-06-01 DIAGNOSIS — E11.21 TYPE 2 DIABETES MELLITUS WITH DIABETIC NEPHROPATHY, WITHOUT LONG-TERM CURRENT USE OF INSULIN (HCC): ICD-10-CM

## 2022-06-01 DIAGNOSIS — E78.5 HYPERLIPIDEMIA, UNSPECIFIED HYPERLIPIDEMIA TYPE: ICD-10-CM

## 2022-06-01 RX ORDER — ROSUVASTATIN CALCIUM 5 MG/1
TABLET, COATED ORAL
Qty: 90 TABLET | Refills: 1 | Status: SHIPPED | OUTPATIENT
Start: 2022-06-01

## 2022-07-19 LAB
BUN BLDV-MCNC: 21 MG/DL
CALCIUM SERPL-MCNC: 9.5 MG/DL
CHLORIDE BLD-SCNC: 105 MMOL/L
CO2: 23 MMOL/L
CREAT SERPL-MCNC: 1.4 MG/DL
GFR CALCULATED: 53
GLUCOSE BLD-MCNC: 119 MG/DL
POTASSIUM SERPL-SCNC: 4.1 MMOL/L
SODIUM BLD-SCNC: 140 MMOL/L

## 2022-07-28 ENCOUNTER — OFFICE VISIT (OUTPATIENT)
Dept: NEPHROLOGY | Age: 72
End: 2022-07-28
Payer: MEDICARE

## 2022-07-28 VITALS
SYSTOLIC BLOOD PRESSURE: 110 MMHG | HEART RATE: 82 BPM | TEMPERATURE: 97.2 F | BODY MASS INDEX: 31.9 KG/M2 | DIASTOLIC BLOOD PRESSURE: 67 MMHG | HEIGHT: 65 IN | RESPIRATION RATE: 16 BRPM | WEIGHT: 191.5 LBS

## 2022-07-28 DIAGNOSIS — N18.31 STAGE 3A CHRONIC KIDNEY DISEASE (HCC): Primary | ICD-10-CM

## 2022-07-28 PROCEDURE — G8417 CALC BMI ABV UP PARAM F/U: HCPCS | Performed by: INTERNAL MEDICINE

## 2022-07-28 PROCEDURE — 99213 OFFICE O/P EST LOW 20 MIN: CPT | Performed by: INTERNAL MEDICINE

## 2022-07-28 PROCEDURE — G8427 DOCREV CUR MEDS BY ELIG CLIN: HCPCS | Performed by: INTERNAL MEDICINE

## 2022-07-28 PROCEDURE — 1036F TOBACCO NON-USER: CPT | Performed by: INTERNAL MEDICINE

## 2022-07-28 PROCEDURE — 1123F ACP DISCUSS/DSCN MKR DOCD: CPT | Performed by: INTERNAL MEDICINE

## 2022-07-28 PROCEDURE — 3017F COLORECTAL CA SCREEN DOC REV: CPT | Performed by: INTERNAL MEDICINE

## 2022-07-28 NOTE — PROGRESS NOTES
[facility]  [address] [facility] [address]  Dept: [phone]  Progress Note  7/28/2022 10:03 AM      Pt Name:    Bette Jaime  YOB: 1950  Primary Care Physician:  Supa Milton MD     Chief Complaint:   Chief Complaint   Patient presents with    Follow-up     CKD 3b, HTN; denies flank pain or edema in hands or feet        History of Present Illness: This is a follow-up visit for CKD III. Hx of DM, HTN, hyperlipidemia, GERD, gout, renal cysts. Patient had a mechanical fall a few weeks ago. Scraped his left arm and has some right shoulder pain which is improving. Otherwise no new events, no new meds, no hospitalizations. BP controlled. Last A1C 7.4. Pertinent items are noted in HPI.          Past History:  Past Medical History:   Diagnosis Date    Benign hematuria     Diabetes mellitus ([facility] [address] 02/09/2013    A1C 7.2    Esophageal reflux 7/2001    Gastritis     GERD (gastroesophageal reflux disease)     Hematuria     Hyperglycemia 9/1999    Hyperlipidemia     Hypertension     Obesity, unspecified 2/2/2015    Other abnormal glucose 2/2/2015    Other and unspecified hyperlipidemia 7/1992    Renal cyst 1983    bilat    Syncope and collapse 2/2/2015    Type II or unspecified type diabetes mellitus without mention of complication, not stated as uncontrolled 2012    Unspecified essential hypertension 10/1991    borderline    Vasodepressor syncope 9/2006     Past Surgical History:   Procedure Laterality Date    APPENDECTOMY  1974    COLONOSCOPY  05/13/2019    -polyps(tubular adenomas)hemorrhoids    COLONOSCOPY N/A 5/13/2019    COLONOSCOPY POLYPECTOMY SNARE/COLD BIOPSY performed by Naya Bunn MD at [facility]    (-)    CYSTOSCOPY  03/2012    retrograde Dr. Trudi Almonte: (-)    CYSTOURETHROSCOPY  4/3/00,6/13/89, 3/16/2012    TONSILLECTOMY AND ADENOIDECTOMY      WRIST FRACTURE SURGERY Left     ORIF        VITALS:  /67 (Site: Left Upper Arm, Position: Sitting, Cuff Size: Medium Adult)   Pulse 75   Temp 97.2 °F (36.2 °C) (Temporal)   Resp 16   Ht 5' 5\" (1.651 m)   Wt 191 lb 8 oz (86.9 kg)   BMI 31.87 kg/m²   Wt Readings from Last 3 Encounters:   07/28/22 191 lb 8 oz (86.9 kg)   02/28/22 196 lb (88.9 kg)   01/27/22 196 lb 1.6 oz (89 kg)     Body mass index is 31.87 kg/m². General Appearance: alert and cooperative with exam, appears comfortable, no distress  HEENT: EOMI, moist oral mucus membranes  Neck: No jugular venous distention,   Lungs: Air entry B/L, no crackles or rales, no use of accessory muscles  Heart: S1, S2 heard, no rub  GI: soft, non-tender, no guarding,   Extremities: No sig LE edema, no cyanosis  Skin: warm, dry  Neurologic: no tremor, no asterixis,      Medications:  Current Outpatient Medications   Medication Sig Dispense Refill    rosuvastatin (CRESTOR) 5 MG tablet TAKE ONE TABLET BY MOUTH DAILY 90 tablet 1    metFORMIN (GLUCOPHAGE-XR) 500 MG extended release tablet TAKE ONE TABLET BY MOUTH TWICE A  tablet 1    lisinopril (PRINIVIL;ZESTRIL) 5 MG tablet Take 1 tablet by mouth daily 90 tablet 3    TRULICITY 1.5 KX/4.8MI SOPN INJECT 1.5 MG UNDER THE SKIN ONCE WEEKLY 12 pen 3    albuterol sulfate HFA (VENTOLIN HFA) 108 (90 Base) MCG/ACT inhaler Inhale 2 puffs into the lungs every 4 hours as needed for Wheezing or Shortness of Breath 18 g 1    allopurinol (ZYLOPRIM) 300 MG tablet TAKE 1/2 TABLET BY MOUTH DAILY 45 tablet 1    Homeopathic Products (OSCILLOCOCCINUM PO) Take by mouth \"I take this in the fall. \"      Marco Glass 50 MG/5ML SYRP Take by mouth \"I take this in the fall. \"      CINNAMON PO Take 1 tablet by mouth 3 times daily       LUTEIN-ZEAXANTHIN PO Take 1 tablet by mouth daily      therapeutic multivitamin-minerals (THERAGRAN-M) tablet Take 1 tablet by mouth daily        vitamin D (CHOLECALCIFEROL) 1000 UNIT TABS tablet Take 1,000 Units by mouth daily. No current facility-administered medications for this visit.         Laboratory & Diagnostics:  CBC:   Lab Results   Component Value Date    WBC 7.8 08/18/2021    HGB 14.8 08/18/2021    HCT 43.4 08/18/2021    MCV 93 08/18/2021     08/18/2021     BMP:    Lab Results   Component Value Date     07/19/2022     02/22/2022     01/18/2022    K 4.1 07/19/2022    K 4.2 02/22/2022    K 4.4 01/18/2022     07/19/2022     02/22/2022     01/18/2022    CO2 23 07/19/2022    CO2 23 02/22/2022    CO2 28 01/18/2022    BUN 21 07/19/2022    BUN 25 02/22/2022    BUN 25 01/18/2022    CREATININE 1.40 07/19/2022    CREATININE 1.69 (H) 02/22/2022    CREATININE 1.53 01/18/2022    GLUCOSE 119 07/19/2022    GLUCOSE 124 (H) 02/22/2022    GLUCOSE 157 01/18/2022      Hepatic:   Lab Results   Component Value Date    AST 24 02/22/2022    AST 30 08/18/2021    AST 31 02/17/2021    ALT 41 (H) 02/22/2022    ALT 47 (H) 08/18/2021    ALT 40 02/17/2021    BILITOT 0.8 08/17/2017    BILITOT 0.6 08/05/2016    BILITOT 0.37 09/27/2015    ALKPHOS 90 08/17/2017    ALKPHOS 81 08/05/2016    ALKPHOS 83 09/27/2015     BNP: No results found for: BNP  Lipids:   Lab Results   Component Value Date    CHOL 140 (L) 02/22/2022    HDL 48 02/22/2022     INR: No results found for: INR  URINE: No results found for: NAUR, PROTUR  Lab Results   Component Value Date/Time    NITRU Negative 12/07/2020 12:00 AM    COLORU Yellow 12/07/2020 12:00 AM    PHUR 6.0 12/07/2020 12:00 AM    WBCUA None 09/27/2015 08:15 PM    RBCUA 0 TO 2 09/27/2015 08:15 PM    MUCUS NOT REPORTED 09/27/2015 08:15 PM    TRICHOMONAS NOT REPORTED 09/27/2015 08:15 PM    YEAST NOT REPORTED 09/27/2015 08:15 PM    BACTERIA NOT REPORTED 09/27/2015 08:15 PM    CLARITYU Clear 12/07/2020 12:00 AM    SPECGRAV >1.030 09/27/2015 08:15 PM    LEUKOCYTESUR Negative 12/07/2020 12:00 AM    UROBILINOGEN Normal 12/07/2020 12:00 AM BILIRUBINUR Negative 12/07/2020 12:00 AM    BLOODU Negative 12/07/2020 12:00 AM    GLUCOSEU negative 12/07/2020 12:00 AM    GLUCOSEU Negative 03/08/2012 12:00 AM    KETUA Negative 12/07/2020 12:00 AM    AMORPHOUS NOT REPORTED 09/27/2015 08:15 PM      Microalbumen/Creatinine ratio:  No components found for: RUCREAT        Impression/Plan:   1. CKD III due to diabetic kidney disease  : stable  Goals of care include slowing rate of progression by controlling blood pressure, blood glucoses and albuminuria and by avoiding nephrotoxins such as NSAIDs and IV contrast.   2. Proteinuria: continue Lisinopril 5 mg daily. 3. B/L simple renal cysts  4. HTN; stable  5. DM  6. Gout    Bloodwork and medications were reviewed and plan of care discussed with the patient. Return to clinic in 1 year or sooner if the need arises.       Vito Mota DO  [facility]

## 2022-07-28 NOTE — PATIENT INSTRUCTIONS
Ok to take tylenol if needed. SURVEY:    You may be receiving a survey from [employer] regarding your visit today. Please complete the survey to enable us to provide the highest quality of care to you and your family. If you cannot score us a very good on any question, please call the office to discuss how we could have made your experience a very good one. Thank you.

## 2022-11-16 PROBLEM — E11.22 TYPE 2 DIABETES MELLITUS WITH CHRONIC KIDNEY DISEASE (HCC): Status: ACTIVE | Noted: 2022-11-16

## 2023-02-25 DIAGNOSIS — N28.1 BILATERAL RENAL CYSTS: ICD-10-CM

## 2023-02-25 DIAGNOSIS — N18.31 STAGE 3A CHRONIC KIDNEY DISEASE (HCC): ICD-10-CM

## 2023-02-25 DIAGNOSIS — I10 ESSENTIAL HYPERTENSION: ICD-10-CM

## 2023-02-25 DIAGNOSIS — R80.9 POSITIVE FOR MACROALBUMINURIA: ICD-10-CM

## 2023-02-27 RX ORDER — LISINOPRIL 5 MG/1
TABLET ORAL
Qty: 90 TABLET | Refills: 3 | OUTPATIENT
Start: 2023-02-27

## 2023-07-20 LAB
ANION GAP SERPL CALCULATED.3IONS-SCNC: 15 MEQ/L (ref 7–16)
BUN BLDV-MCNC: 23 MG/DL (ref 8–23)
CALCIUM SERPL-MCNC: 9.2 MG/DL (ref 8.5–10.5)
CHLORIDE BLD-SCNC: 108 MEQ/L (ref 95–107)
CO2: 18 MEQ/L (ref 19–31)
CREAT SERPL-MCNC: 1.59 MG/DL (ref 0.8–1.4)
CREATINE, URINE: 99.7 MG/DL
EGFR IF NONAFRICAN AMERICAN: 45 ML/MIN/1.73
GLUCOSE: 158 MG/DL (ref 70–99)
HCT VFR BLD CALC: 43.7 % (ref 40–51)
HEMOGLOBIN: 15.1 G/DL (ref 13.5–17)
MCH RBC QN AUTO: 31.4 PG (ref 25–33)
MCHC RBC AUTO-ENTMCNC: 34.6 G/DL (ref 31–36)
MCV RBC AUTO: 90.9 FL (ref 80–99)
MICROALBUMIN/CREAT 24H UR: 18.7 MG/DL
MICROALBUMIN/CREAT UR-RTO: 188 MG/G
PDW BLD-RTO: 12.9 % (ref 11.5–15)
PHOSPHORUS: 3.1 MG/DL (ref 2.5–4.5)
PLATELETS: 174 K/UL (ref 130–400)
PMV BLD AUTO: 10.6 FL (ref 9.3–13)
POTASSIUM SERPL-SCNC: 4.4 MEQ/L (ref 3.5–5.4)
RBC: 4.81 M/UL (ref 4.5–6.1)
SODIUM BLD-SCNC: 141 MEQ/L (ref 133–146)
VITAMIN D 25-HYDROXY: 38 NG/ML
WBC: 7.5 K/UL (ref 3.5–11)

## 2023-07-22 LAB — PARATHYROID HORMONE INTACT: 56 PG/ML (ref 15–65)

## 2023-07-27 ENCOUNTER — OFFICE VISIT (OUTPATIENT)
Dept: NEPHROLOGY | Age: 73
End: 2023-07-27
Payer: MEDICARE

## 2023-07-27 VITALS
TEMPERATURE: 96.7 F | SYSTOLIC BLOOD PRESSURE: 99 MMHG | RESPIRATION RATE: 16 BRPM | DIASTOLIC BLOOD PRESSURE: 68 MMHG | WEIGHT: 199.4 LBS | HEIGHT: 65 IN | HEART RATE: 80 BPM | BODY MASS INDEX: 33.22 KG/M2

## 2023-07-27 DIAGNOSIS — N18.31 STAGE 3A CHRONIC KIDNEY DISEASE (HCC): Primary | ICD-10-CM

## 2023-07-27 PROCEDURE — 3078F DIAST BP <80 MM HG: CPT | Performed by: INTERNAL MEDICINE

## 2023-07-27 PROCEDURE — 1123F ACP DISCUSS/DSCN MKR DOCD: CPT | Performed by: INTERNAL MEDICINE

## 2023-07-27 PROCEDURE — 1036F TOBACCO NON-USER: CPT | Performed by: INTERNAL MEDICINE

## 2023-07-27 PROCEDURE — G8417 CALC BMI ABV UP PARAM F/U: HCPCS | Performed by: INTERNAL MEDICINE

## 2023-07-27 PROCEDURE — 3074F SYST BP LT 130 MM HG: CPT | Performed by: INTERNAL MEDICINE

## 2023-07-27 PROCEDURE — 99214 OFFICE O/P EST MOD 30 MIN: CPT | Performed by: INTERNAL MEDICINE

## 2023-07-27 PROCEDURE — G8427 DOCREV CUR MEDS BY ELIG CLIN: HCPCS | Performed by: INTERNAL MEDICINE

## 2023-07-27 PROCEDURE — 99213 OFFICE O/P EST LOW 20 MIN: CPT | Performed by: INTERNAL MEDICINE

## 2023-07-27 PROCEDURE — 3017F COLORECTAL CA SCREEN DOC REV: CPT | Performed by: INTERNAL MEDICINE

## 2023-07-27 NOTE — PATIENT INSTRUCTIONS
SURVEY:    You may be receiving a survey from [employer] regarding your visit today. Please complete the survey to enable us to provide the highest quality of care to you and your family. If you cannot score us a very good on any question, please call the office to discuss how we could have made your experience a very good one. Thank you.

## 2023-09-19 PROBLEM — E11.65 TYPE 2 DIABETES MELLITUS WITH HYPERGLYCEMIA (HCC): Status: ACTIVE | Noted: 2023-09-19

## 2023-10-10 ENCOUNTER — HOSPITAL ENCOUNTER (OUTPATIENT)
Age: 73
Discharge: HOME | End: 2023-10-12
Attending: FAMILY MEDICINE
Payer: MEDICARE

## 2023-10-10 ENCOUNTER — HOSPITAL ENCOUNTER (OUTPATIENT)
Dept: NUCLEAR MEDICINE | Age: 73
Discharge: HOME | End: 2023-10-12
Attending: FAMILY MEDICINE
Payer: MEDICARE

## 2023-10-10 DIAGNOSIS — R53.83 OTHER FATIGUE: ICD-10-CM

## 2023-10-10 DIAGNOSIS — R06.09 DYSPNEA ON EXERTION: ICD-10-CM

## 2023-10-10 DIAGNOSIS — E11.21 TYPE 2 DIABETES MELLITUS WITH DIABETIC NEPHROPATHY, WITHOUT LONG-TERM CURRENT USE OF INSULIN (HCC): ICD-10-CM

## 2023-10-10 DIAGNOSIS — E08.21 DIABETIC NEPHROPATHY ASSOCIATED WITH DIABETES MELLITUS DUE TO UNDERLYING CONDITION (HCC): ICD-10-CM

## 2023-10-10 DIAGNOSIS — R55 VASODEPRESSOR SYNCOPE: ICD-10-CM

## 2023-10-10 DIAGNOSIS — R06.09 OTHER FORMS OF DYSPNEA: ICD-10-CM

## 2023-10-10 DIAGNOSIS — I10 ESSENTIAL HYPERTENSION, MALIGNANT: ICD-10-CM

## 2023-10-10 DIAGNOSIS — G90.9 AUTONOMIC NEUROPATHY: ICD-10-CM

## 2023-10-10 DIAGNOSIS — I10 ESSENTIAL HYPERTENSION: ICD-10-CM

## 2023-10-10 DIAGNOSIS — G90.9: ICD-10-CM

## 2023-10-10 PROCEDURE — A9500 TC99M SESTAMIBI: HCPCS | Performed by: FAMILY MEDICINE

## 2023-10-10 PROCEDURE — 3430000000 HC RX DIAGNOSTIC RADIOPHARMACEUTICAL: Performed by: FAMILY MEDICINE

## 2023-10-10 PROCEDURE — 93017 CV STRESS TEST TRACING ONLY: CPT

## 2023-10-10 RX ORDER — TETRAKIS(2-METHOXYISOBUTYLISOCYANIDE)COPPER(I) TETRAFLUOROBORATE 1 MG/ML
30 INJECTION, POWDER, LYOPHILIZED, FOR SOLUTION INTRAVENOUS
Status: COMPLETED | OUTPATIENT
Start: 2023-10-10 | End: 2023-10-10

## 2023-10-10 RX ADMIN — Medication 30 MILLICURIE: at 10:47

## 2023-10-11 ENCOUNTER — HOSPITAL ENCOUNTER (OUTPATIENT)
Dept: NUCLEAR MEDICINE | Age: 73
Discharge: HOME | End: 2023-10-13
Attending: FAMILY MEDICINE
Payer: MEDICARE

## 2023-10-11 LAB
NUC STRESS EJECTION FRACTION: 76 %
STRESS BASELINE DIAS BP: 80 MMHG
STRESS BASELINE HR: 85 BPM
STRESS BASELINE SYS BP: 148 MMHG
STRESS ESTIMATED WORKLOAD: 6 METS
STRESS EXERCISE DUR MIN: 4 MIN
STRESS EXERCISE DUR SEC: 32 SEC
STRESS PEAK DIAS BP: 90 MMHG
STRESS PEAK SYS BP: 182 MMHG
STRESS PERCENT HR ACHIEVED: 103 %
STRESS POST PEAK HR: 150 BPM
STRESS RATE PRESSURE PRODUCT: NORMAL BPM*MMHG
STRESS ST DEPRESSION: 0.7 MM
STRESS STAGE 1 DURATION: 3 MIN:SEC
STRESS STAGE 1 HR: 120 BPM
STRESS STAGE RECOVERY 1 BP: NORMAL MMHG
STRESS STAGE RECOVERY 1 DURATION: 0 MIN:SEC
STRESS STAGE RECOVERY 1 HR: 137 BPM
STRESS STAGE RECOVERY 2 DURATION: 1 MIN:SEC
STRESS STAGE RECOVERY 2 HR: 148 BPM
STRESS STAGE RECOVERY 3 BP: NORMAL MMHG
STRESS STAGE RECOVERY 3 DURATION: 3 MIN:SEC
STRESS STAGE RECOVERY 3 HR: 90 BPM
STRESS STAGE RECOVERY 4 BP: NORMAL MMHG
STRESS STAGE RECOVERY 4 DURATION: 5 MIN:SEC
STRESS STAGE RECOVERY 4 HR: 84 BPM
STRESS TARGET HR: 145 BPM

## 2023-10-11 PROCEDURE — 93018 CV STRESS TEST I&R ONLY: CPT | Performed by: FAMILY MEDICINE

## 2023-10-11 PROCEDURE — A9500 TC99M SESTAMIBI: HCPCS | Performed by: FAMILY MEDICINE

## 2023-10-11 PROCEDURE — 93016 CV STRESS TEST SUPVJ ONLY: CPT | Performed by: FAMILY MEDICINE

## 2023-10-11 PROCEDURE — 78452 HT MUSCLE IMAGE SPECT MULT: CPT | Performed by: FAMILY MEDICINE

## 2023-10-11 PROCEDURE — 3430000000 HC RX DIAGNOSTIC RADIOPHARMACEUTICAL: Performed by: FAMILY MEDICINE

## 2023-10-11 RX ORDER — TETRAKIS(2-METHOXYISOBUTYLISOCYANIDE)COPPER(I) TETRAFLUOROBORATE 1 MG/ML
30 INJECTION, POWDER, LYOPHILIZED, FOR SOLUTION INTRAVENOUS
Status: COMPLETED | OUTPATIENT
Start: 2023-10-11 | End: 2023-10-11

## 2023-10-11 RX ADMIN — Medication 30 MILLICURIE: at 13:46

## 2024-01-20 LAB
BUN BLDV-MCNC: 21 MG/DL
CALCIUM SERPL-MCNC: 9.7 MG/DL
CHLORIDE BLD-SCNC: 104 MMOL/L
CO2: 27 MMOL/L
CREAT SERPL-MCNC: 1.63 MG/DL
CREATININE, URINE: 101.5
EGFR: 44
GLUCOSE BLD-MCNC: 135 MG/DL
MICROALBUMIN/CREAT 24H UR: 30.7 MG/G{CREAT}
MICROALBUMIN/CREAT UR-RTO: 302
POTASSIUM SERPL-SCNC: 4.5 MMOL/L
SODIUM BLD-SCNC: 141 MMOL/L

## 2024-01-25 ENCOUNTER — OFFICE VISIT (OUTPATIENT)
Dept: NEPHROLOGY | Age: 74
End: 2024-01-25
Payer: MEDICARE

## 2024-01-25 VITALS
RESPIRATION RATE: 16 BRPM | HEIGHT: 65 IN | HEART RATE: 72 BPM | TEMPERATURE: 97.9 F | BODY MASS INDEX: 32.59 KG/M2 | SYSTOLIC BLOOD PRESSURE: 115 MMHG | WEIGHT: 195.6 LBS | DIASTOLIC BLOOD PRESSURE: 75 MMHG

## 2024-01-25 DIAGNOSIS — E11.21 TYPE 2 DIABETES MELLITUS WITH DIABETIC NEPHROPATHY, WITHOUT LONG-TERM CURRENT USE OF INSULIN (HCC): ICD-10-CM

## 2024-01-25 DIAGNOSIS — N18.32 STAGE 3B CHRONIC KIDNEY DISEASE (HCC): Primary | ICD-10-CM

## 2024-01-25 PROCEDURE — 3078F DIAST BP <80 MM HG: CPT | Performed by: INTERNAL MEDICINE

## 2024-01-25 PROCEDURE — 3074F SYST BP LT 130 MM HG: CPT | Performed by: INTERNAL MEDICINE

## 2024-01-25 PROCEDURE — 1036F TOBACCO NON-USER: CPT | Performed by: INTERNAL MEDICINE

## 2024-01-25 PROCEDURE — 3046F HEMOGLOBIN A1C LEVEL >9.0%: CPT | Performed by: INTERNAL MEDICINE

## 2024-01-25 PROCEDURE — 99213 OFFICE O/P EST LOW 20 MIN: CPT | Performed by: INTERNAL MEDICINE

## 2024-01-25 PROCEDURE — 3017F COLORECTAL CA SCREEN DOC REV: CPT | Performed by: INTERNAL MEDICINE

## 2024-01-25 PROCEDURE — G8484 FLU IMMUNIZE NO ADMIN: HCPCS | Performed by: INTERNAL MEDICINE

## 2024-01-25 PROCEDURE — G8417 CALC BMI ABV UP PARAM F/U: HCPCS | Performed by: INTERNAL MEDICINE

## 2024-01-25 PROCEDURE — G8427 DOCREV CUR MEDS BY ELIG CLIN: HCPCS | Performed by: INTERNAL MEDICINE

## 2024-01-25 PROCEDURE — 99214 OFFICE O/P EST MOD 30 MIN: CPT | Performed by: INTERNAL MEDICINE

## 2024-01-25 PROCEDURE — 1123F ACP DISCUSS/DSCN MKR DOCD: CPT | Performed by: INTERNAL MEDICINE

## 2024-01-25 PROCEDURE — 2022F DILAT RTA XM EVC RTNOPTHY: CPT | Performed by: INTERNAL MEDICINE

## 2024-01-25 NOTE — PATIENT INSTRUCTIONS
SURVEY:    Thank you for allowing us to care for you today.    You may be receiving a survey from [facility] regarding your visit today- electronically or via mail.      Please help us by completing the survey as this will provide the needed feedback to ensure we are providing the very best care for you and your family.    If you cannot score us a very good on any question, please call the office to discuss how we could have made your experience a very good one.    Thank you.       STAFF:    Amalia Higgins, Conchis Brannon, Nicolasa Strange      CLINICAL STAFF:    Poornima Parikh LPN, Sujatha Lomeli LPN, Diane Browning LPN, Gosia Meraz CMA

## 2024-07-19 LAB
ANION GAP SERPL CALCULATED.3IONS-SCNC: 13 MEQ/L (ref 7–16)
BUN BLDV-MCNC: 25 MG/DL (ref 8–23)
CALCIUM SERPL-MCNC: 9.5 MG/DL (ref 8.5–10.5)
CHLORIDE BLD-SCNC: 106 MEQ/L (ref 95–107)
CO2: 22 MEQ/L (ref 19–31)
CREAT SERPL-MCNC: 1.68 MG/DL (ref 0.8–1.4)
CREATINE, URINE: 111.8 MG/DL
EGFR IF NONAFRICAN AMERICAN: 42 ML/MIN/1.73
GLUCOSE: 136 MG/DL (ref 70–99)
HCT VFR BLD CALC: 46.8 % (ref 40–51)
HEMOGLOBIN: 15.4 G/DL (ref 13.5–17)
MCH RBC QN AUTO: 30.9 PG (ref 25–33)
MCHC RBC AUTO-ENTMCNC: 32.9 G/DL (ref 31–36)
MCV RBC AUTO: 93.8 FL (ref 80–99)
MICROALBUMIN/CREAT 24H UR: 26.2 MG/DL
MICROALBUMIN/CREAT UR-RTO: 234 MG/G
PDW BLD-RTO: 13 % (ref 11.5–15)
PHOSPHORUS: 3.2 MG/DL (ref 2.5–4.5)
PLATELET # BLD: 190 K/UL (ref 130–400)
PMV BLD AUTO: 10.7 FL (ref 9.3–13)
POTASSIUM SERPL-SCNC: 4.5 MEQ/L (ref 3.5–5.4)
RBC # BLD: 4.99 M/UL (ref 4.5–6.1)
SODIUM BLD-SCNC: 141 MEQ/L (ref 133–146)
VITAMIN D 25-HYDROXY: 44 NG/ML
WBC # BLD: 8.6 K/UL (ref 3.5–11)

## 2024-07-23 LAB — PTH INTACT: 84 PG/ML (ref 15–65)

## 2024-07-25 ENCOUNTER — OFFICE VISIT (OUTPATIENT)
Dept: NEPHROLOGY | Age: 74
End: 2024-07-25
Payer: MEDICARE

## 2024-07-25 VITALS — SYSTOLIC BLOOD PRESSURE: 124 MMHG | TEMPERATURE: 98 F | DIASTOLIC BLOOD PRESSURE: 65 MMHG | HEART RATE: 93 BPM

## 2024-07-25 DIAGNOSIS — N18.32 STAGE 3B CHRONIC KIDNEY DISEASE (HCC): Primary | ICD-10-CM

## 2024-07-25 DIAGNOSIS — I10 ESSENTIAL HYPERTENSION: ICD-10-CM

## 2024-07-25 PROCEDURE — 99213 OFFICE O/P EST LOW 20 MIN: CPT | Performed by: INTERNAL MEDICINE

## 2024-07-25 NOTE — PATIENT INSTRUCTIONS
SURVEY:    Thank you for allowing us to care for you today.    You may be receiving a survey from [facility] regarding your visit today- electronically or via mail.      Please help us by completing the survey as this will provide the needed feedback to ensure we are providing the very best care for you and your family.    If you cannot score us a very good on any question, please call the office to discuss how we could have made your experience a very good one.    Thank you.       STAFF:    Amalia Higgins, Conchis Brannon, Nicolasa Strange      CLINICAL STAFF:    Poornima Parikh LPN, Sujatha Lomeli LPN, Diane Browning LPN, Gosia Meraz CMA

## 2024-07-25 NOTE — PROGRESS NOTES
control  -continue Ace-I  -avoid nephrotoxic agents     2. Proteinuria: worse, see above  3. B/L simple renal cysts  4. HTN; stable  5. DM  6. Gout    Bloodwork and medications were reviewed and plan of care discussed with the patient.  Return to clinic in 6 months or sooner if the need arises.      Juanis Galvez, DO  [facility]

## 2024-12-16 ENCOUNTER — HOSPITAL ENCOUNTER (OUTPATIENT)
Dept: PHYSICAL THERAPY | Age: 74
Setting detail: THERAPIES SERIES
Discharge: HOME | End: 2024-12-16
Payer: MEDICARE

## 2024-12-16 PROCEDURE — 97112 NEUROMUSCULAR REEDUCATION: CPT

## 2024-12-16 PROCEDURE — 97110 THERAPEUTIC EXERCISES: CPT

## 2024-12-16 PROCEDURE — 97161 PT EVAL LOW COMPLEX 20 MIN: CPT

## 2024-12-16 NOTE — PLAN OF CARE
Plan   Plan Frequency: 2  Plan weeks: 4  [x] HP/CP      [] Electrical Stim   [x] Therapeutic Exercise      [x] Gait Training  [] Aquatics   [] Ultrasound         [x] Patient Education/HEP   [x] Manual Therapy  [] Traction    [x] Neuro-neo        [x] Soft Tissue Mobs            [x] Therapeutic Activity  [] Iontophoresis    [] Orthotic casting/fitting      [] Dry Needling  [] Blood Flow Restriction [] Vasopneumatic Compression             Electronically signed by: Giselle Knight PT, DPT    Date: 12/16/2024      ______________________________________ Date: 12/16/2024   Physician Signature
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
## 2024-12-18 ENCOUNTER — HOSPITAL ENCOUNTER (OUTPATIENT)
Dept: PHYSICAL THERAPY | Age: 74
Setting detail: THERAPIES SERIES
Discharge: HOME | End: 2024-12-18
Payer: MEDICARE

## 2024-12-18 PROCEDURE — 97112 NEUROMUSCULAR REEDUCATION: CPT

## 2024-12-18 NOTE — PROGRESS NOTES
Phone: [phone]                 [facility]           Fax: [phone]                           Outpatient Physical Therapy                                                                            Daily Note    Patient: Derrell Mantilla : 1950  [facility] #: [id number]   Referring Physician: Ana Hamilton APRN -*  Date: 2024    Diagnosis: Vertigo, R42  Treatment Diagnosis: Vertigo    PT Insurance Information: Medicare  Total # of Visits Approved: 12 Per Physician Order  Total # of Visits to Date: 2  No Show: 0  Canceled Appointment: 0    25 Plan of Care/Recert Due    Pre-Treatment Pain:  0/10  Subjective: Patient reports worsening vertigo since initial visit on Monday, especially with rolling onto his side and with bending over.    Exercises:  Exercise 1: HEP: Connor daroff exercises, 3 cycles per session, 2 sessions per day  Exercise 2: Epley x1 for L posterior canal  Exercise 3: BBQ roll for L horizontal canal x1    Assessment  Assessment: Pt initially presented today with (+) L [place] hallpike with torsional upbeating nystagmus and dizziness for ~20seconds. Treated patient with Epley maneuver. On re-test L Radha hallpike pt demonstrated severe horizontal geotrophic nystagmus for ~15seconds. Tested patient then with roll test but patient was (-) bilaterally. Treated patient with BBQ roll for L hoizontal canalithiasis and patient tolerated well. Plan to re-test tomorrow and treat according to presentation.    Activity Tolerance  Activity Tolerance: Patient tolerated treatment well    Patient Education  Exercise technique and rationale   Pt verbalized/demonstrated good understanding:     [x] Yes         [] No, pt required further clarification.       Post Treatment Pain:  0/10      Plan  Plan Frequency: 2  Plan weeks: 4       Goals  (Total # of Visits to Date: 2)      Short Term Goals  Time Frame for Short Term Goals: 3 weeks  Short Term Goal 1: Patient to initiate HEP for

## 2024-12-19 ENCOUNTER — HOSPITAL ENCOUNTER (OUTPATIENT)
Dept: PHYSICAL THERAPY | Age: 74
Setting detail: THERAPIES SERIES
Discharge: HOME | End: 2024-12-19
Payer: MEDICARE

## 2024-12-19 PROCEDURE — 97112 NEUROMUSCULAR REEDUCATION: CPT

## 2024-12-19 NOTE — PROGRESS NOTES
Phone: [phone]                 [facility]           Fax: [phone]                           Outpatient Physical Therapy                                                                            Daily Note    Patient: Derrell Mantilla : 1950  CSN #: [id number]   Referring Physician: Ana Hamilton APRN -*  Date: 2024    Diagnosis: Vertigo, R42  Treatment Diagnosis: Vertigo    PT Insurance Information: Medicare  Total # of Visits Approved: 12 Per Physician Order  Total # of Visits to Date: 3  No Show: 0  Canceled Appointment: 0    25 Plan of Care/Recert Due    Pre-Treatment Pain:  0/10  Subjective: Pt reports he feels better today.    Exercises:  Exercise 1: HEP: Connor daroff exercises, 3 cycles per session, 2 sessions per day    Assessment  Assessment: Pt with (+) L Radha hallpike with mild torsional nystagmus and dizziness lasting ~5seconds. Focused on Connor daroff habituation exercises, 2 full cycles today. Held Epley maneuver this date. Will re-test tomorrow and treat as needed.    Activity Tolerance  Activity Tolerance: Patient tolerated treatment well    Patient Education  Exercise technique   Pt verbalized/demonstrated good understanding:     [x] Yes         [] No, pt required further clarification.       Post Treatment Pain:  0/10      Plan  Plan Frequency: 2  Plan weeks: 4       Goals  (Total # of Visits to Date: 3)      Short Term Goals  Time Frame for Short Term Goals: 3 weeks  Short Term Goal 1: Patient to initiate HEP for habituation.-met  Short Term Goal 2: Patient to tolerate canalith repositioning maneuvers to treat vertigo.-met    Long Term Goals  Time Frame for Long Term Goals : 6 weeks  Long Term Goal 1: Patient to be independent and compliant with HEP.  Long Term Goal 2: Patient to test (-) for B Radha hallpike and roll tests with no dizziness or nystagmus noted.-progressing  Long Term Goal 3: Patient to deny dizziness or LOB's with head turns or positional

## 2024-12-20 ENCOUNTER — HOSPITAL ENCOUNTER (OUTPATIENT)
Dept: PHYSICAL THERAPY | Age: 74
Setting detail: THERAPIES SERIES
Discharge: HOME | End: 2024-12-20
Payer: MEDICARE

## 2024-12-20 PROCEDURE — 97112 NEUROMUSCULAR REEDUCATION: CPT

## 2024-12-20 NOTE — PROGRESS NOTES
Phone: [phone]                 [facility]           Fax: [phone]                           Outpatient Physical Therapy                                                                            Daily Note    Patient: Derrell Mantilla : 1950  CSN #: [id number]   Referring Physician: Ana Hamilton APRN -*  Date: 2024    Diagnosis: Vertigo, R42  Treatment Diagnosis: Vertigo    PT Insurance Information: Medicare  Total # of Visits Approved: 12 Per Physician Order  Total # of Visits to Date: 4  No Show: 0  Canceled Appointment: 0    25 Plan of Care/Recert Due    Pre-Treatment Pain:  0/10  Subjective: Patient reports feeling pretty good, no issues rolling over in bed and was able to bend down to pet the cat last night.    Exercises:  Exercise 1: HEP: Connor daroff exercises, 3 cycles per session, 2 sessions per day    Assessment  Assessment: Patient continues with (+) L [place] hallpike with mild torsional nystagmus lasting <5seconds. Completed 3 cycles of Connor daroff habituation exercises with good tolerance from patient. Instructed patient to perform these once/day at home and call if another follow up is needed.    Activity Tolerance  Activity Tolerance: Patient tolerated treatment well    Patient Education  Exercise technique, continue HEP   Pt verbalized/demonstrated good understanding:     [x] Yes         [] No, pt required further clarification.       Post Treatment Pain:  0/10      Plan  Plan Frequency: 2  Plan weeks: 4       Goals  (Total # of Visits to Date: 4)      Short Term Goals  Time Frame for Short Term Goals: 3 weeks  Short Term Goal 1: Patient to initiate HEP for habituation.-met  Short Term Goal 2: Patient to tolerate canalith repositioning maneuvers to treat vertigo.-met    Long Term Goals  Time Frame for Long Term Goals : 6 weeks  Long Term Goal 1: Patient to be independent and compliant with HEP.  Long Term Goal 2: Patient to test (-) for B [place] hallpike and

## 2025-01-09 PROBLEM — N18.32 STAGE 3B CHRONIC KIDNEY DISEASE (HCC): Status: ACTIVE | Noted: 2020-09-24

## 2025-01-21 LAB
ANION GAP SERPL CALCULATED.3IONS-SCNC: 14 MMOL/L (ref 7–16)
BUN BLDV-MCNC: 26 MG/DL (ref 8–23)
CALCIUM SERPL-MCNC: 9 MG/DL (ref 8.6–10.5)
CHLORIDE BLD-SCNC: 108 MMOL/L (ref 96–107)
CO2: 24 MMOL/L (ref 18–32)
CREAT SERPL-MCNC: 1.94 MG/DL (ref 0.67–1.3)
CREATINE, URINE: 115.9 MG/DL
EGFR IF NONAFRICAN AMERICAN: 35 ML/MIN/1.73M2
GLUCOSE: 159 MG/DL (ref 70–100)
MICROALBUMIN/CREAT 24H UR: 169.6 MG/L
MICROALBUMIN/CREAT UR-RTO: 146 MG/G
POTASSIUM SERPL-SCNC: 4.5 MMOL/L (ref 3.5–5.4)
SODIUM BLD-SCNC: 146 MMOL/L (ref 135–148)

## 2025-01-23 ENCOUNTER — OFFICE VISIT (OUTPATIENT)
Dept: NEPHROLOGY | Age: 75
End: 2025-01-23
Payer: MEDICARE

## 2025-01-23 VITALS
HEIGHT: 65 IN | DIASTOLIC BLOOD PRESSURE: 60 MMHG | TEMPERATURE: 97 F | SYSTOLIC BLOOD PRESSURE: 101 MMHG | RESPIRATION RATE: 18 BRPM | HEART RATE: 89 BPM | BODY MASS INDEX: 33.82 KG/M2 | WEIGHT: 203 LBS

## 2025-01-23 DIAGNOSIS — N18.32 STAGE 3B CHRONIC KIDNEY DISEASE (HCC): Primary | ICD-10-CM

## 2025-01-23 DIAGNOSIS — E11.21 TYPE 2 DIABETES MELLITUS WITH DIABETIC NEPHROPATHY, WITHOUT LONG-TERM CURRENT USE OF INSULIN (HCC): ICD-10-CM

## 2025-01-23 PROCEDURE — 1036F TOBACCO NON-USER: CPT | Performed by: INTERNAL MEDICINE

## 2025-01-23 PROCEDURE — 99214 OFFICE O/P EST MOD 30 MIN: CPT | Performed by: INTERNAL MEDICINE

## 2025-01-23 PROCEDURE — G8427 DOCREV CUR MEDS BY ELIG CLIN: HCPCS | Performed by: INTERNAL MEDICINE

## 2025-01-23 PROCEDURE — 3078F DIAST BP <80 MM HG: CPT | Performed by: INTERNAL MEDICINE

## 2025-01-23 PROCEDURE — G8417 CALC BMI ABV UP PARAM F/U: HCPCS | Performed by: INTERNAL MEDICINE

## 2025-01-23 PROCEDURE — 1160F RVW MEDS BY RX/DR IN RCRD: CPT | Performed by: INTERNAL MEDICINE

## 2025-01-23 PROCEDURE — 1123F ACP DISCUSS/DSCN MKR DOCD: CPT | Performed by: INTERNAL MEDICINE

## 2025-01-23 PROCEDURE — 3074F SYST BP LT 130 MM HG: CPT | Performed by: INTERNAL MEDICINE

## 2025-01-23 PROCEDURE — 1159F MED LIST DOCD IN RCRD: CPT | Performed by: INTERNAL MEDICINE

## 2025-01-23 NOTE — PATIENT INSTRUCTIONS
Start Jardiance 10 mg daily  Labs 1 month after starting it  SURVEY:    Thank you for allowing us to care for you today.    You may be receiving a survey from [facility] regarding your visit today- electronically or via mail.      Please help us by completing the survey as this will provide the needed feedback to ensure we are providing the very best care for you and your family.    If you cannot score us a very good on any question, please call the office to discuss how we could have made your experience a very good one.    Thank you.       STAFF:    Yadira Balderrama, Nicolasa BROWNING      CLINICAL STAFF:    Poornima LUDWIG, Amalia BROWNING, Gosia BROWNING, Sujatha LUDWIG

## 2025-01-23 NOTE — PROGRESS NOTES
[facility]NEY [facility]  [address] [address]  Dept: [phone]  Progress Note  1/23/2025 11:02 AM      Pt Name:    Derrell Mantilla  YOB: 1950  Primary Care Physician:  Ana Hamilton, APRN - CNP     Chief Complaint:   Chief Complaint   Patient presents with    Chronic Kidney Disease     Here for CKD 3b follow up. Denies any symptoms at this time.         History of Present Illness:   This is a follow-up visit for CKD III.  Hx of DM, HTN, hyperlipidemia, GERD, gout, renal cysts.     Doing ok.   Sugars have been high, he states insulin might be added.   Bp ok.         Pertinent items are noted in HPI.         Past History:  Past Medical History:   Diagnosis Date    Benign hematuria     Diabetes mellitus (HCC) 02/09/2013    A1C 7.2    Esophageal reflux 7/2001    Gastritis     GERD (gastroesophageal reflux disease)     Hematuria     Hyperglycemia 9/1999    Hyperlipidemia     Hypertension     Obesity, unspecified 2/2/2015    Other abnormal glucose 2/2/2015    Other and unspecified hyperlipidemia 7/1992    Renal cyst 1983    bilat    Syncope and collapse 2/2/2015    Type II or unspecified type diabetes mellitus without mention of complication, not stated as uncontrolled 2012    Unspecified essential hypertension 10/1991    borderline    Vasodepressor syncope 9/2006     Past Surgical History:   Procedure Laterality Date    APPENDECTOMY  1974    COLONOSCOPY  05/13/2019    -polyps(tubular adenomas)hemorrhoids    COLONOSCOPY N/A 5/13/2019    COLONOSCOPY POLYPECTOMY SNARE/COLD BIOPSY performed by Kendrick Martel MD at [facility] OR    CYSTOSCOPY  2000    (-)    CYSTOSCOPY  03/2012    retrograde Dr. Alba: (-)    CYSTOURETHROSCOPY  4/3/00,6/13/89, 3/16/2012    TONSILLECTOMY AND ADENOIDECTOMY      WRIST FRACTURE SURGERY Left     ORIF        VITALS:  Resp 18   Ht 1.651 m (5' 5\")   Wt 92.1 kg (203

## 2025-02-25 ENCOUNTER — TELEPHONE (OUTPATIENT)
Dept: NEPHROLOGY | Age: 75
End: 2025-02-25

## 2025-02-25 LAB
ANION GAP SERPL CALCULATED.3IONS-SCNC: 14 MMOL/L (ref 7–16)
BUN BLDV-MCNC: 37 MG/DL (ref 8–23)
CALCIUM SERPL-MCNC: 9 MG/DL (ref 8.6–10.5)
CHLORIDE BLD-SCNC: 110 MMOL/L (ref 96–107)
CO2: 23 MMOL/L (ref 18–32)
CREAT SERPL-MCNC: 2.01 MG/DL (ref 0.67–1.3)
EGFR IF NONAFRICAN AMERICAN: 34 ML/MIN/1.73M2
GLUCOSE: 127 MG/DL (ref 70–100)
POTASSIUM SERPL-SCNC: 3.9 MMOL/L (ref 3.5–5.4)
SODIUM BLD-SCNC: 147 MMOL/L (ref 135–148)

## 2025-02-25 NOTE — TELEPHONE ENCOUNTER
----- Message from Dr. Juanis Galvez DO sent at 2/25/2025 12:25 PM EST -----  Kidney function looks stable with addition of Jardiance

## 2025-04-17 LAB
ANION GAP SERPL CALCULATED.3IONS-SCNC: 14 MMOL/L (ref 7–16)
BUN BLDV-MCNC: 20 MG/DL (ref 8–23)
CALCIUM SERPL-MCNC: 8.9 MG/DL (ref 8.6–10.5)
CHLORIDE BLD-SCNC: 109 MMOL/L (ref 96–107)
CO2: 21 MMOL/L (ref 18–32)
CREAT SERPL-MCNC: 1.64 MG/DL (ref 0.67–1.3)
CREATINE, URINE: 65 MG/DL
EGFR IF NONAFRICAN AMERICAN: 43 ML/MIN/1.73M2
GLUCOSE: 212 MG/DL (ref 65–125)
MICROALBUMIN/CREAT 24H UR: 87.8 MG/L
MICROALBUMIN/CREAT UR-RTO: 135 MG/G
POTASSIUM SERPL-SCNC: 4.3 MMOL/L (ref 3.5–5.4)
SODIUM BLD-SCNC: 144 MMOL/L (ref 135–148)

## 2025-04-24 ENCOUNTER — OFFICE VISIT (OUTPATIENT)
Dept: NEPHROLOGY | Age: 75
End: 2025-04-24
Payer: MEDICARE

## 2025-04-24 VITALS
RESPIRATION RATE: 16 BRPM | DIASTOLIC BLOOD PRESSURE: 63 MMHG | TEMPERATURE: 98.9 F | SYSTOLIC BLOOD PRESSURE: 105 MMHG | HEIGHT: 64 IN | BODY MASS INDEX: 33.46 KG/M2 | WEIGHT: 196 LBS | HEART RATE: 94 BPM

## 2025-04-24 DIAGNOSIS — N18.32 STAGE 3B CHRONIC KIDNEY DISEASE (HCC): Primary | ICD-10-CM

## 2025-04-24 PROCEDURE — 1123F ACP DISCUSS/DSCN MKR DOCD: CPT | Performed by: INTERNAL MEDICINE

## 2025-04-24 PROCEDURE — 99214 OFFICE O/P EST MOD 30 MIN: CPT | Performed by: INTERNAL MEDICINE

## 2025-04-24 PROCEDURE — 3078F DIAST BP <80 MM HG: CPT | Performed by: INTERNAL MEDICINE

## 2025-04-24 PROCEDURE — G8427 DOCREV CUR MEDS BY ELIG CLIN: HCPCS | Performed by: INTERNAL MEDICINE

## 2025-04-24 PROCEDURE — 1036F TOBACCO NON-USER: CPT | Performed by: INTERNAL MEDICINE

## 2025-04-24 PROCEDURE — 1159F MED LIST DOCD IN RCRD: CPT | Performed by: INTERNAL MEDICINE

## 2025-04-24 PROCEDURE — 3074F SYST BP LT 130 MM HG: CPT | Performed by: INTERNAL MEDICINE

## 2025-04-24 PROCEDURE — G8417 CALC BMI ABV UP PARAM F/U: HCPCS | Performed by: INTERNAL MEDICINE

## 2025-04-24 NOTE — PROGRESS NOTES
09/27/2015 08:15 PM    BACTERIA NOT REPORTED 09/27/2015 08:15 PM    CLARITYU Clear 12/07/2020 12:00 AM    LEUKOCYTESUR Negative 12/07/2020 12:00 AM    UROBILINOGEN Normal 12/07/2020 12:00 AM    BILIRUBINUR Negative 12/07/2020 12:00 AM    BLOODU Negative 12/07/2020 12:00 AM    GLUCOSEU negative 12/07/2020 12:00 AM    GLUCOSEU Negative 03/08/2012 12:00 AM    KETUA Negative 12/07/2020 12:00 AM    AMORPHOUS NOT REPORTED 09/27/2015 08:15 PM      Microalbumen/Creatinine ratio:  No components found for: \"RUCREAT\"        Impression/Plan:   1. CKD III due to diabetic kidney disease:   Was worsening but appears slightly better/stable this visit.  Added jardiance and on low dose ACE-I     2. Proteinuria: mild, on Ace-I and SGLT-2 inhibitor  3. B/L simple renal cysts  4. HTN; stable  5. DM  6. Gout    Bloodwork and medications were reviewed and plan of care discussed with the patient.  Return to clinic in 6 months or sooner if the need arises.      Juanis Galvez, DO  [facility]

## (undated) DEVICE — SOLUTION IV IRRIG POUR BRL 0.9% SODIUM CHL 2F7124

## (undated) DEVICE — SNARE EXACTO COLD

## (undated) DEVICE — TRAP SURG QUAD PARABOLA SLOT DSGN SFTY SCRN TRAPEASE

## (undated) DEVICE — CANNULA ORAL NSL AD CO2 N INTUB O2 DEL DISP TRU LNK

## (undated) DEVICE — MEDI-VAC NON-CONDUCTIVE TUBING7MM X 30.5 (100FT): Brand: CARDINAL HEALTH